# Patient Record
Sex: MALE | Race: WHITE | Employment: OTHER | URBAN - METROPOLITAN AREA
[De-identification: names, ages, dates, MRNs, and addresses within clinical notes are randomized per-mention and may not be internally consistent; named-entity substitution may affect disease eponyms.]

---

## 2019-11-27 ENCOUNTER — HOSPITAL ENCOUNTER (OUTPATIENT)
Dept: ULTRASOUND IMAGING | Age: 54
Discharge: HOME | End: 2019-11-27
Attending: INTERNAL MEDICINE
Payer: COMMERCIAL

## 2019-11-27 DIAGNOSIS — N18.30 CHRONIC KIDNEY DISEASE, STAGE III (MODERATE) (HCC): ICD-10-CM

## 2019-11-27 PROCEDURE — 76770 US EXAM ABDO BACK WALL COMP: CPT

## 2021-01-02 ENCOUNTER — HOSPITAL ENCOUNTER (EMERGENCY)
Age: 56
Discharge: HOME | End: 2021-01-02
Attending: EMERGENCY MEDICINE
Payer: COMMERCIAL

## 2021-01-02 ENCOUNTER — APPOINTMENT (OUTPATIENT)
Dept: ULTRASOUND IMAGING | Age: 56
End: 2021-01-02
Attending: EMERGENCY MEDICINE
Payer: COMMERCIAL

## 2021-01-02 VITALS
RESPIRATION RATE: 10 BRPM | BODY MASS INDEX: 27.64 KG/M2 | OXYGEN SATURATION: 95 % | DIASTOLIC BLOOD PRESSURE: 88 MMHG | SYSTOLIC BLOOD PRESSURE: 129 MMHG | HEART RATE: 90 BPM | WEIGHT: 208.56 LBS | TEMPERATURE: 97.9 F | HEIGHT: 73 IN

## 2021-01-02 DIAGNOSIS — N23 RENAL COLIC: ICD-10-CM

## 2021-01-02 DIAGNOSIS — R73.9 HYPERGLYCEMIA: ICD-10-CM

## 2021-01-02 DIAGNOSIS — N20.0 NEPHROLITHIASIS: Primary | ICD-10-CM

## 2021-01-02 DIAGNOSIS — N18.30 STAGE 3 CHRONIC KIDNEY DISEASE, UNSPECIFIED WHETHER STAGE 3A OR 3B CKD (HCC): ICD-10-CM

## 2021-01-02 LAB
ALBUMIN SERPL-MCNC: 4.3 G/DL (ref 3.5–5)
ALBUMIN/GLOB SERPL: 1 {RATIO} (ref 1.1–2.2)
ALP SERPL-CCNC: 98 U/L (ref 45–117)
ALT SERPL-CCNC: 34 U/L (ref 12–78)
ANION GAP SERPL CALC-SCNC: 10 MMOL/L (ref 5–15)
ANION GAP SERPL CALC-SCNC: 13 MMOL/L (ref 5–15)
APPEARANCE UR: CLEAR
AST SERPL-CCNC: 34 U/L (ref 15–37)
BACTERIA URNS QL MICRO: NEGATIVE /HPF
BASOPHILS # BLD: 0.1 K/UL (ref 0–0.1)
BASOPHILS NFR BLD: 1 % (ref 0–1)
BILIRUB SERPL-MCNC: 0.7 MG/DL (ref 0.2–1)
BILIRUB UR QL: NEGATIVE
BUN SERPL-MCNC: 17 MG/DL (ref 6–20)
BUN SERPL-MCNC: 18 MG/DL (ref 6–20)
BUN/CREAT SERPL: 10 (ref 12–20)
BUN/CREAT SERPL: 9 (ref 12–20)
CALCIUM SERPL-MCNC: 10.1 MG/DL (ref 8.5–10.1)
CALCIUM SERPL-MCNC: 8.5 MG/DL (ref 8.5–10.1)
CHLORIDE SERPL-SCNC: 105 MMOL/L (ref 97–108)
CHLORIDE SERPL-SCNC: 99 MMOL/L (ref 97–108)
CO2 SERPL-SCNC: 25 MMOL/L (ref 21–32)
CO2 SERPL-SCNC: 26 MMOL/L (ref 21–32)
COLOR UR: ABNORMAL
COMMENT, HOLDF: NORMAL
CREAT SERPL-MCNC: 1.76 MG/DL (ref 0.7–1.3)
CREAT SERPL-MCNC: 1.97 MG/DL (ref 0.7–1.3)
DIFFERENTIAL METHOD BLD: ABNORMAL
EOSINOPHIL # BLD: 0.1 K/UL (ref 0–0.4)
EOSINOPHIL NFR BLD: 1 % (ref 0–7)
EPITH CASTS URNS QL MICRO: ABNORMAL /LPF
ERYTHROCYTE [DISTWIDTH] IN BLOOD BY AUTOMATED COUNT: 13.1 % (ref 11.5–14.5)
ERYTHROCYTE [DISTWIDTH] IN BLOOD BY AUTOMATED COUNT: 13.1 % (ref 11.5–14.5)
GLOBULIN SER CALC-MCNC: 4.4 G/DL (ref 2–4)
GLUCOSE BLD STRIP.AUTO-MCNC: 248 MG/DL (ref 65–100)
GLUCOSE SERPL-MCNC: 219 MG/DL (ref 65–100)
GLUCOSE SERPL-MCNC: 300 MG/DL (ref 65–100)
GLUCOSE UR STRIP.AUTO-MCNC: >1000 MG/DL
HCT VFR BLD AUTO: 39.2 % (ref 36.6–50.3)
HCT VFR BLD AUTO: 47 % (ref 36.6–50.3)
HGB BLD-MCNC: 12.5 G/DL (ref 12.1–17)
HGB BLD-MCNC: 14.7 G/DL (ref 12.1–17)
HGB UR QL STRIP: ABNORMAL
HYALINE CASTS URNS QL MICRO: ABNORMAL /LPF (ref 0–5)
IMM GRANULOCYTES # BLD AUTO: 0.1 K/UL (ref 0–0.04)
IMM GRANULOCYTES NFR BLD AUTO: 1 % (ref 0–0.5)
KETONES UR QL STRIP.AUTO: NEGATIVE MG/DL
LEUKOCYTE ESTERASE UR QL STRIP.AUTO: NEGATIVE
LYMPHOCYTES # BLD: 2 K/UL (ref 0.8–3.5)
LYMPHOCYTES NFR BLD: 12 % (ref 12–49)
MCH RBC QN AUTO: 26.6 PG (ref 26–34)
MCH RBC QN AUTO: 26.9 PG (ref 26–34)
MCHC RBC AUTO-ENTMCNC: 31.3 G/DL (ref 30–36.5)
MCHC RBC AUTO-ENTMCNC: 31.9 G/DL (ref 30–36.5)
MCV RBC AUTO: 84.3 FL (ref 80–99)
MCV RBC AUTO: 85 FL (ref 80–99)
MONOCYTES # BLD: 1.9 K/UL (ref 0–1)
MONOCYTES NFR BLD: 11 % (ref 5–13)
NEUTS SEG # BLD: 12.6 K/UL (ref 1.8–8)
NEUTS SEG NFR BLD: 74 % (ref 32–75)
NITRITE UR QL STRIP.AUTO: NEGATIVE
NRBC # BLD: 0 K/UL (ref 0–0.01)
NRBC # BLD: 0 K/UL (ref 0–0.01)
NRBC BLD-RTO: 0 PER 100 WBC
NRBC BLD-RTO: 0 PER 100 WBC
PH UR STRIP: 5 [PH] (ref 5–8)
PLATELET # BLD AUTO: 283 K/UL (ref 150–400)
PLATELET # BLD AUTO: 454 K/UL (ref 150–400)
PMV BLD AUTO: 10.6 FL (ref 8.9–12.9)
PMV BLD AUTO: 10.6 FL (ref 8.9–12.9)
POTASSIUM SERPL-SCNC: 4.3 MMOL/L (ref 3.5–5.1)
POTASSIUM SERPL-SCNC: 4.7 MMOL/L (ref 3.5–5.1)
PROT SERPL-MCNC: 8.7 G/DL (ref 6.4–8.2)
PROT UR STRIP-MCNC: ABNORMAL MG/DL
RBC # BLD AUTO: 4.65 M/UL (ref 4.1–5.7)
RBC # BLD AUTO: 5.53 M/UL (ref 4.1–5.7)
RBC #/AREA URNS HPF: ABNORMAL /HPF (ref 0–5)
SAMPLES BEING HELD,HOLD: NORMAL
SERVICE CMNT-IMP: ABNORMAL
SODIUM SERPL-SCNC: 138 MMOL/L (ref 136–145)
SODIUM SERPL-SCNC: 140 MMOL/L (ref 136–145)
SP GR UR REFRACTOMETRY: >1.03 (ref 1–1.03)
UR CULT HOLD, URHOLD: NORMAL
UROBILINOGEN UR QL STRIP.AUTO: 0.2 EU/DL (ref 0.2–1)
WBC # BLD AUTO: 13.4 K/UL (ref 4.1–11.1)
WBC # BLD AUTO: 16.8 K/UL (ref 4.1–11.1)
WBC URNS QL MICRO: ABNORMAL /HPF (ref 0–4)

## 2021-01-02 PROCEDURE — 76770 US EXAM ABDO BACK WALL COMP: CPT

## 2021-01-02 PROCEDURE — 36415 COLL VENOUS BLD VENIPUNCTURE: CPT

## 2021-01-02 PROCEDURE — 99285 EMERGENCY DEPT VISIT HI MDM: CPT

## 2021-01-02 PROCEDURE — 80053 COMPREHEN METABOLIC PANEL: CPT

## 2021-01-02 PROCEDURE — 85027 COMPLETE CBC AUTOMATED: CPT

## 2021-01-02 PROCEDURE — 74011250637 HC RX REV CODE- 250/637: Performed by: EMERGENCY MEDICINE

## 2021-01-02 PROCEDURE — 96374 THER/PROPH/DIAG INJ IV PUSH: CPT

## 2021-01-02 PROCEDURE — 74011636637 HC RX REV CODE- 636/637: Performed by: EMERGENCY MEDICINE

## 2021-01-02 PROCEDURE — 81001 URINALYSIS AUTO W/SCOPE: CPT

## 2021-01-02 PROCEDURE — 82962 GLUCOSE BLOOD TEST: CPT

## 2021-01-02 PROCEDURE — 85025 COMPLETE CBC W/AUTO DIFF WBC: CPT

## 2021-01-02 PROCEDURE — 96361 HYDRATE IV INFUSION ADD-ON: CPT

## 2021-01-02 PROCEDURE — 96375 TX/PRO/DX INJ NEW DRUG ADDON: CPT

## 2021-01-02 PROCEDURE — 74011250636 HC RX REV CODE- 250/636: Performed by: EMERGENCY MEDICINE

## 2021-01-02 RX ORDER — ONDANSETRON 4 MG/1
4 TABLET, ORALLY DISINTEGRATING ORAL
Status: COMPLETED | OUTPATIENT
Start: 2021-01-02 | End: 2021-01-02

## 2021-01-02 RX ORDER — BUPROPION HYDROCHLORIDE 300 MG/1
300 TABLET ORAL
COMMUNITY
End: 2021-03-09 | Stop reason: SDUPTHER

## 2021-01-02 RX ORDER — OXYCODONE AND ACETAMINOPHEN 5; 325 MG/1; MG/1
2 TABLET ORAL ONCE
Status: COMPLETED | OUTPATIENT
Start: 2021-01-02 | End: 2021-01-02

## 2021-01-02 RX ORDER — SERTRALINE HYDROCHLORIDE 50 MG/1
50 TABLET, FILM COATED ORAL DAILY
COMMUNITY
End: 2021-03-09 | Stop reason: SDUPTHER

## 2021-01-02 RX ORDER — ONDANSETRON 4 MG/1
4 TABLET, ORALLY DISINTEGRATING ORAL
Qty: 10 TAB | Refills: 0 | Status: SHIPPED | OUTPATIENT
Start: 2021-01-02 | End: 2021-02-22

## 2021-01-02 RX ORDER — ONDANSETRON 2 MG/ML
4 INJECTION INTRAMUSCULAR; INTRAVENOUS ONCE
Status: COMPLETED | OUTPATIENT
Start: 2021-01-02 | End: 2021-01-02

## 2021-01-02 RX ORDER — OXYCODONE AND ACETAMINOPHEN 5; 325 MG/1; MG/1
1 TABLET ORAL
Qty: 12 TAB | Refills: 0 | Status: SHIPPED | OUTPATIENT
Start: 2021-01-02 | End: 2021-01-05

## 2021-01-02 RX ORDER — OXYCODONE HYDROCHLORIDE 5 MG/1
5 TABLET ORAL
Status: COMPLETED | OUTPATIENT
Start: 2021-01-02 | End: 2021-01-02

## 2021-01-02 RX ORDER — OXYCODONE AND ACETAMINOPHEN 5; 325 MG/1; MG/1
1 TABLET ORAL ONCE
Status: COMPLETED | OUTPATIENT
Start: 2021-01-02 | End: 2021-01-02

## 2021-01-02 RX ADMIN — OXYCODONE HYDROCHLORIDE 5 MG: 5 TABLET ORAL at 11:16

## 2021-01-02 RX ADMIN — ONDANSETRON 4 MG: 4 TABLET, ORALLY DISINTEGRATING ORAL at 10:13

## 2021-01-02 RX ADMIN — OXYCODONE HYDROCHLORIDE AND ACETAMINOPHEN 1 TABLET: 5; 325 TABLET ORAL at 11:16

## 2021-01-02 RX ADMIN — OXYCODONE HYDROCHLORIDE AND ACETAMINOPHEN 2 TABLET: 5; 325 TABLET ORAL at 10:13

## 2021-01-02 RX ADMIN — HUMAN INSULIN 6 UNITS: 100 INJECTION, SOLUTION SUBCUTANEOUS at 11:04

## 2021-01-02 RX ADMIN — ONDANSETRON 4 MG: 2 INJECTION INTRAMUSCULAR; INTRAVENOUS at 11:16

## 2021-01-02 RX ADMIN — SODIUM CHLORIDE 1000 ML: 9 INJECTION, SOLUTION INTRAVENOUS at 11:05

## 2021-01-02 RX ADMIN — SODIUM CHLORIDE 1000 ML: 900 INJECTION, SOLUTION INTRAVENOUS at 10:13

## 2021-01-02 NOTE — ED TRIAGE NOTES
Pt reports bilateral flank pain and left sided groin pain that started yesterday. Pt reports \"yesterday when he urinated it felt like glass. \" Pt reports \"he has self diagnosed with kidney stones and has been dealing with them for months. \" Pt denies seeing a urologist in the past couple of months.

## 2021-01-02 NOTE — ED PROVIDER NOTES
55-year-old male with a history of diabetes, hypertension, nephrolithiasis, and sarcoidosis is here with kidney stone pain on the left side that is caused him to have trouble sleeping for the past 2 nights.  He says he has seen [facility] in the past and they have had to remove several stones.  This feels entirely similar to multiple prior episodes of kidney stones.  He says the pain radiates around his left flank and down towards his groin.  At times it hurts when he urinates and he feels like he is urinating sand.  Some of the urine is dark, so he presumes there is some blood.  He has had nausea, but no vomiting.  No fever.  He has never had a urinary tract infection from stones.           Past Medical History:   Diagnosis Date   • Cervical stenosis of spinal canal 5/13/2016   • Diabetes (HCC)    • GERD (gastroesophageal reflux disease)    • Hypertension    • Ill-defined condition     sarcoidosis       Past Surgical History:   Procedure Laterality Date   • HX ORTHOPAEDIC      multi lumbar lami/fusions   • HX TONSILLECTOMY     • [place] ABDOMEN SURGERY PROC UNLISTED  2013    liver biopsy         History reviewed. No pertinent family history.    Social History     Socioeconomic History   • Marital status: SINGLE     Spouse name: Not on file   • Number of children: Not on file   • Years of education: Not on file   • Highest education level: Not on file   Occupational History   • Not on file   Social Needs   • Financial resource strain: Not on file   • Food insecurity     Worry: Not on file     Inability: Not on file   • Transportation needs     Medical: Not on file     Non-medical: Not on file   Tobacco Use   • Smoking status: Current Some Day Smoker   • Smokeless tobacco: Never Used   • Tobacco comment: occaisnal cigar   Substance and Sexual Activity   • Alcohol use: Yes     Alcohol/week: 1.0 standard drinks     Types: 1 Glasses of wine per week     Comment: socially   • Drug use: No   • Sexual activity: Not on  file   Lifestyle    Physical activity     Days per week: Not on file     Minutes per session: Not on file    Stress: Not on file   Relationships    Social connections     Talks on phone: Not on file     Gets together: Not on file     Attends [employer] service: Not on file     Active member of club or organization: Not on file     Attends meetings of clubs or organizations: Not on file     Relationship status: Not on file    Intimate partner violence     Fear of current or ex partner: Not on file     Emotionally abused: Not on file     Physically abused: Not on file     Forced sexual activity: Not on file   Other Topics Concern    Not on file   Social History Narrative    Not on file         ALLERGIES: Patient has no known allergies. Review of Systems   Constitutional: Negative for fever. HENT: Negative for trouble swallowing. Eyes: Negative for visual disturbance. Respiratory: Negative for cough. Cardiovascular: Negative for chest pain. Gastrointestinal: Positive for constipation and diarrhea. Negative for abdominal pain. Genitourinary: Positive for dysuria and flank pain. Negative for difficulty urinating. Musculoskeletal: Negative for gait problem. Skin: Negative for rash. Neurological: Negative for headaches. Hematological: Does not bruise/bleed easily. Psychiatric/Behavioral: Negative for sleep disturbance. Vitals:    01/02/21 0939 01/02/21 0942 01/02/21 0945   BP:  (!) 170/105 (!) 144/104   Pulse:  (!) 115 (!) 111   Resp:  20 15   Temp:  97.9 °F (36.6 °C)    SpO2: 99% 98% 95%   Weight:  94.6 kg (208 lb 8.9 oz)    Height:  6' 1\" (1.854 m)             Physical Exam  Constitutional:       Appearance: Normal appearance. HENT:      Head: Normocephalic. Nose: Nose normal.      Mouth/Throat:      Mouth: Mucous membranes are moist.   Eyes:      Extraocular Movements: Extraocular movements intact.       Conjunctiva/sclera: Conjunctivae normal.   Cardiovascular:      Rate and Rhythm: Tachycardia present.   Pulmonary:      Effort: Pulmonary effort is normal. No respiratory distress.   Abdominal:      General: Abdomen is flat.      Palpations: Abdomen is soft.      Tenderness: There is no abdominal tenderness. There is left CVA tenderness. There is no right CVA tenderness.   Musculoskeletal: Normal range of motion.   Skin:     Findings: No rash.   Neurological:      General: No focal deficit present.      Mental Status: He is alert.   Psychiatric:         Behavior: Behavior normal.          MDM  Number of Diagnoses or Management Options  Hyperglycemia  Nephrolithiasis  Renal colic  Stage 3 chronic kidney disease, unspecified whether stage 3a or 3b CKD  Diagnosis management comments: Patient came in fairly well-appearing, but had tachycardia to 100 bpm to 120 bpm.  He also had some leukocytosis, but no fever and he was certainly not septic appearing.  I think both of these are a result of pain, hyperglycemia, and dehydration.  Patient had a normal heart rate at discharge and felt much better.  I see no signs of infection in this patient is not septic.  He is suffering from kidney stones, chronic kidney disease, and high blood sugar.  I gave him some fluids, pain meds, nausea meds, and insulin and his blood sugar is better as are all his symptoms.  He knows to follow-up with the urologist or return to the emergency department should his symptoms worsen.  I prescribed him Percocet and Zofran         Procedures

## 2021-02-22 ENCOUNTER — HOSPITAL ENCOUNTER (OUTPATIENT)
Age: 56
Setting detail: OBSERVATION
Discharge: HOME | End: 2021-02-24
Attending: EMERGENCY MEDICINE | Admitting: HOSPITALIST
Payer: COMMERCIAL

## 2021-02-22 ENCOUNTER — HOSPITAL ENCOUNTER (OUTPATIENT)
Dept: PREADMISSION TESTING | Age: 56
Discharge: HOME | End: 2021-02-22
Payer: COMMERCIAL

## 2021-02-22 ENCOUNTER — APPOINTMENT (OUTPATIENT)
Dept: CT IMAGING | Age: 56
End: 2021-02-22
Attending: STUDENT IN AN ORGANIZED HEALTH CARE EDUCATION/TRAINING PROGRAM
Payer: COMMERCIAL

## 2021-02-22 VITALS
DIASTOLIC BLOOD PRESSURE: 76 MMHG | BODY MASS INDEX: 24.95 KG/M2 | RESPIRATION RATE: 16 BRPM | WEIGHT: 194.45 LBS | HEIGHT: 74 IN | TEMPERATURE: 96 F | SYSTOLIC BLOOD PRESSURE: 105 MMHG | OXYGEN SATURATION: 99 % | HEART RATE: 74 BPM

## 2021-02-22 DIAGNOSIS — R79.89 ELEVATED SERUM CREATININE: ICD-10-CM

## 2021-02-22 DIAGNOSIS — E16.2 HYPOGLYCEMIA: ICD-10-CM

## 2021-02-22 DIAGNOSIS — E87.5 ACUTE HYPERKALEMIA: Primary | ICD-10-CM

## 2021-02-22 LAB
ALBUMIN SERPL-MCNC: 4.2 G/DL (ref 3.5–5)
ALBUMIN/GLOB SERPL: 1 {RATIO} (ref 1.1–2.2)
ALP SERPL-CCNC: 59 U/L (ref 45–117)
ALT SERPL-CCNC: 22 U/L (ref 12–78)
ANION GAP SERPL CALC-SCNC: 5 MMOL/L (ref 5–15)
ANION GAP SERPL CALC-SCNC: 6 MMOL/L (ref 5–15)
APPEARANCE UR: ABNORMAL
AST SERPL-CCNC: 30 U/L (ref 15–37)
ATRIAL RATE: 67 BPM
BACTERIA URNS QL MICRO: NEGATIVE /HPF
BASE DEFICIT BLDV-SCNC: 8.8 MMOL/L
BASOPHILS # BLD: 0.2 K/UL (ref 0–0.1)
BASOPHILS NFR BLD: 1 % (ref 0–1)
BDY SITE: ABNORMAL
BILIRUB SERPL-MCNC: 0.4 MG/DL (ref 0.2–1)
BILIRUB UR QL CFM: POSITIVE
BUN SERPL-MCNC: 43 MG/DL (ref 6–20)
BUN SERPL-MCNC: 44 MG/DL (ref 6–20)
BUN/CREAT SERPL: 16 (ref 12–20)
BUN/CREAT SERPL: 17 (ref 12–20)
CALCIUM SERPL-MCNC: 8.9 MG/DL (ref 8.5–10.1)
CALCIUM SERPL-MCNC: 9.6 MG/DL (ref 8.5–10.1)
CALCULATED P AXIS, ECG09: 8 DEGREES
CALCULATED R AXIS, ECG10: -4 DEGREES
CALCULATED T AXIS, ECG11: 18 DEGREES
CHLORIDE SERPL-SCNC: 106 MMOL/L (ref 97–108)
CHLORIDE SERPL-SCNC: 108 MMOL/L (ref 97–108)
CO2 SERPL-SCNC: 20 MMOL/L (ref 21–32)
CO2 SERPL-SCNC: 24 MMOL/L (ref 21–32)
COLOR UR: ABNORMAL
CREAT SERPL-MCNC: 2.5 MG/DL (ref 0.7–1.3)
CREAT SERPL-MCNC: 2.74 MG/DL (ref 0.7–1.3)
DIAGNOSIS, 93000: NORMAL
DIFFERENTIAL METHOD BLD: ABNORMAL
EOSINOPHIL # BLD: 0.2 K/UL (ref 0–0.4)
EOSINOPHIL NFR BLD: 1 % (ref 0–7)
EPITH CASTS URNS QL MICRO: ABNORMAL /LPF
ERYTHROCYTE [DISTWIDTH] IN BLOOD BY AUTOMATED COUNT: 13.2 % (ref 11.5–14.5)
GLOBULIN SER CALC-MCNC: 4.3 G/DL (ref 2–4)
GLUCOSE BLD STRIP.AUTO-MCNC: 63 MG/DL (ref 65–100)
GLUCOSE SERPL-MCNC: 40 MG/DL (ref 65–100)
GLUCOSE SERPL-MCNC: 98 MG/DL (ref 65–100)
GLUCOSE UR STRIP.AUTO-MCNC: NEGATIVE MG/DL
HCO3 BLDV-SCNC: 19 MMOL/L (ref 23–28)
HCT VFR BLD AUTO: 48.5 % (ref 36.6–50.3)
HGB BLD-MCNC: 14.7 G/DL (ref 12.1–17)
HGB UR QL STRIP: ABNORMAL
HYALINE CASTS URNS QL MICRO: ABNORMAL /LPF (ref 0–5)
IMM GRANULOCYTES # BLD AUTO: 0.1 K/UL (ref 0–0.04)
IMM GRANULOCYTES NFR BLD AUTO: 1 % (ref 0–0.5)
KETONES UR QL STRIP.AUTO: 15 MG/DL
LEUKOCYTE ESTERASE UR QL STRIP.AUTO: ABNORMAL
LYMPHOCYTES # BLD: 1.6 K/UL (ref 0.8–3.5)
LYMPHOCYTES NFR BLD: 10 % (ref 12–49)
MCH RBC QN AUTO: 26.8 PG (ref 26–34)
MCHC RBC AUTO-ENTMCNC: 30.3 G/DL (ref 30–36.5)
MCV RBC AUTO: 88.3 FL (ref 80–99)
MONOCYTES # BLD: 1 K/UL (ref 0–1)
MONOCYTES NFR BLD: 6 % (ref 5–13)
NEUTS SEG # BLD: 12.3 K/UL (ref 1.8–8)
NEUTS SEG NFR BLD: 81 % (ref 32–75)
NITRITE UR QL STRIP.AUTO: POSITIVE
NRBC # BLD: 0 K/UL (ref 0–0.01)
NRBC BLD-RTO: 0 PER 100 WBC
P-R INTERVAL, ECG05: 172 MS
PCO2 BLDV: 45.6 MMHG (ref 41–51)
PH BLDV: 7.23 [PH] (ref 7.32–7.42)
PH UR STRIP: 5.5 [PH] (ref 5–8)
PLATELET # BLD AUTO: 521 K/UL (ref 150–400)
PMV BLD AUTO: 10.2 FL (ref 8.9–12.9)
PO2 BLDV: 45 MMHG (ref 25–40)
POTASSIUM SERPL-SCNC: 6.6 MMOL/L (ref 3.5–5.1)
POTASSIUM SERPL-SCNC: 6.8 MMOL/L (ref 3.5–5.1)
PROT SERPL-MCNC: 8.5 G/DL (ref 6.4–8.2)
PROT UR STRIP-MCNC: 300 MG/DL
Q-T INTERVAL, ECG07: 440 MS
QRS DURATION, ECG06: 136 MS
QTC CALCULATION (BEZET), ECG08: 464 MS
RBC # BLD AUTO: 5.49 M/UL (ref 4.1–5.7)
RBC #/AREA URNS HPF: >100 /HPF (ref 0–5)
SAO2 % BLDV: 73 % (ref 65–88)
SAO2% DEVICE SAO2% SENSOR NAME: ABNORMAL
SERVICE CMNT-IMP: ABNORMAL
SODIUM SERPL-SCNC: 134 MMOL/L (ref 136–145)
SODIUM SERPL-SCNC: 135 MMOL/L (ref 136–145)
SP GR UR REFRACTOMETRY: 1.03 (ref 1–1.03)
SPECIMEN SITE: ABNORMAL
TROPONIN I SERPL-MCNC: <0.05 NG/ML
UA: UC IF INDICATED,UAUC: ABNORMAL
UROBILINOGEN UR QL STRIP.AUTO: 1 EU/DL (ref 0.2–1)
VENTRICULAR RATE, ECG03: 67 BPM
WBC # BLD AUTO: 15.3 K/UL (ref 4.1–11.1)
WBC URNS QL MICRO: >100 /HPF (ref 0–4)

## 2021-02-22 PROCEDURE — 74011636637 HC RX REV CODE- 636/637: Performed by: STUDENT IN AN ORGANIZED HEALTH CARE EDUCATION/TRAINING PROGRAM

## 2021-02-22 PROCEDURE — 99285 EMERGENCY DEPT VISIT HI MDM: CPT

## 2021-02-22 PROCEDURE — 93005 ELECTROCARDIOGRAM TRACING: CPT

## 2021-02-22 PROCEDURE — 74011000250 HC RX REV CODE- 250: Performed by: STUDENT IN AN ORGANIZED HEALTH CARE EDUCATION/TRAINING PROGRAM

## 2021-02-22 PROCEDURE — 80053 COMPREHEN METABOLIC PANEL: CPT

## 2021-02-22 PROCEDURE — 74176 CT ABD & PELVIS W/O CONTRAST: CPT

## 2021-02-22 PROCEDURE — 81001 URINALYSIS AUTO W/SCOPE: CPT

## 2021-02-22 PROCEDURE — 36415 COLL VENOUS BLD VENIPUNCTURE: CPT

## 2021-02-22 PROCEDURE — 87086 URINE CULTURE/COLONY COUNT: CPT

## 2021-02-22 PROCEDURE — 74011250636 HC RX REV CODE- 250/636: Performed by: STUDENT IN AN ORGANIZED HEALTH CARE EDUCATION/TRAINING PROGRAM

## 2021-02-22 PROCEDURE — 84484 ASSAY OF TROPONIN QUANT: CPT

## 2021-02-22 PROCEDURE — 82803 BLOOD GASES ANY COMBINATION: CPT

## 2021-02-22 PROCEDURE — 82962 GLUCOSE BLOOD TEST: CPT

## 2021-02-22 PROCEDURE — 74011000258 HC RX REV CODE- 258: Performed by: STUDENT IN AN ORGANIZED HEALTH CARE EDUCATION/TRAINING PROGRAM

## 2021-02-22 PROCEDURE — 85025 COMPLETE CBC W/AUTO DIFF WBC: CPT

## 2021-02-22 PROCEDURE — 96361 HYDRATE IV INFUSION ADD-ON: CPT

## 2021-02-22 PROCEDURE — 96375 TX/PRO/DX INJ NEW DRUG ADDON: CPT

## 2021-02-22 PROCEDURE — 96374 THER/PROPH/DIAG INJ IV PUSH: CPT

## 2021-02-22 RX ORDER — DEXTROSE MONOHYDRATE AND SODIUM CHLORIDE 5; .45 G/100ML; G/100ML
150 INJECTION, SOLUTION INTRAVENOUS CONTINUOUS
Status: DISCONTINUED | OUTPATIENT
Start: 2021-02-22 | End: 2021-02-23

## 2021-02-22 RX ORDER — SODIUM CHLORIDE 9 MG/ML
125 INJECTION, SOLUTION INTRAVENOUS CONTINUOUS
Status: CANCELLED | OUTPATIENT
Start: 2021-02-22

## 2021-02-22 RX ORDER — SODIUM POLYSTYRENE SULFONATE 15 G/60ML
30 SUSPENSION ORAL; RECTAL
Status: ACTIVE | OUTPATIENT
Start: 2021-02-22 | End: 2021-02-23

## 2021-02-22 RX ORDER — PROPRANOLOL HYDROCHLORIDE 60 MG/1
60 CAPSULE, EXTENDED RELEASE ORAL DAILY
COMMUNITY
End: 2021-09-03

## 2021-02-22 RX ORDER — INSULIN GLARGINE 100 [IU]/ML
20 INJECTION, SOLUTION SUBCUTANEOUS EVERY EVENING
Status: ON HOLD | COMMUNITY
End: 2021-02-24 | Stop reason: SDUPTHER

## 2021-02-22 RX ORDER — OXYCODONE AND ACETAMINOPHEN 5; 325 MG/1; MG/1
1 TABLET ORAL
Status: ON HOLD | COMMUNITY
End: 2021-03-01 | Stop reason: SDUPTHER

## 2021-02-22 RX ORDER — OMEPRAZOLE 20 MG/1
20 CAPSULE, DELAYED RELEASE ORAL DAILY
COMMUNITY
End: 2021-03-09 | Stop reason: SDUPTHER

## 2021-02-22 RX ORDER — SULFAMETHOXAZOLE AND TRIMETHOPRIM 800; 160 MG/1; MG/1
1 TABLET ORAL 2 TIMES DAILY
COMMUNITY
Start: 2021-02-08 | End: 2021-02-24

## 2021-02-22 RX ORDER — ATORVASTATIN CALCIUM 20 MG/1
20 TABLET, FILM COATED ORAL DAILY
COMMUNITY
End: 2021-02-22

## 2021-02-22 RX ORDER — DEXTROAMPHETAMINE SACCHARATE, AMPHETAMINE ASPARTATE, DEXTROAMPHETAMINE SULFATE AND AMPHETAMINE SULFATE 5; 5; 5; 5 MG/1; MG/1; MG/1; MG/1
20 TABLET ORAL
COMMUNITY
End: 2021-03-09 | Stop reason: SDUPTHER

## 2021-02-22 RX ORDER — CALCIUM GLUCONATE 94 MG/ML
1 INJECTION, SOLUTION INTRAVENOUS
Status: COMPLETED | OUTPATIENT
Start: 2021-02-22 | End: 2021-02-22

## 2021-02-22 RX ORDER — DEXTROSE 50 % IN WATER (D50W) INTRAVENOUS SYRINGE
25
Status: COMPLETED | OUTPATIENT
Start: 2021-02-22 | End: 2021-02-22

## 2021-02-22 RX ORDER — KETOROLAC TROMETHAMINE 10 MG/1
10 TABLET, FILM COATED ORAL
COMMUNITY
End: 2021-02-24

## 2021-02-22 RX ORDER — SITAGLIPTIN AND METFORMIN HYDROCHLORIDE 50; 1000 MG/1; MG/1
1 TABLET, FILM COATED, EXTENDED RELEASE ORAL 2 TIMES DAILY WITH MEALS
COMMUNITY
End: 2021-02-24

## 2021-02-22 RX ADMIN — DEXTROSE MONOHYDRATE 25 G: 25 INJECTION, SOLUTION INTRAVENOUS at 22:28

## 2021-02-22 RX ADMIN — CALCIUM GLUCONATE 1 G: 98 INJECTION, SOLUTION INTRAVENOUS at 22:29

## 2021-02-22 RX ADMIN — DEXTROSE AND SODIUM CHLORIDE 150 ML/HR: 5; 450 INJECTION, SOLUTION INTRAVENOUS at 22:20

## 2021-02-22 RX ADMIN — INSULIN HUMAN 10 UNITS: 100 INJECTION, SOLUTION PARENTERAL at 22:28

## 2021-02-22 NOTE — PERIOP NOTES
It is now mandated that all surgical patients be tested for COVID-19 prior to surgery. Testing has to be exactly 4 days prior to surgery. Your COVID test date is Thursday, Feb 25th   between 8:00 am and 11:00 am.       COVID testing will be performed curbside at the [facility] [place] [place]. There will be signs leading you to the testing site. You will need to bring a photo ID with you to be swabbed. Patients are advised to self-quarantine at home after testing and prior to your surgery date. You will be notified if your results are positive. What to watch for:   Coronavirus (COVID-19) affects different people in different ways   It also appears with a wide range of symptoms from mild to severe   Signs usually appear 2-14 days after exposure     If you develop any of the following, notify your doctor immediately:  o Fever  o Chills, with or without a shiver  o Muscle pain  o Headache  o Sore throat  o Dry cough  o New loss of taste or smell  o Tiredness      If you develop any of the following, call 911:  o Shortness of breath  o Difficulty breathing  o Chest pain  o New confusion  Blueness of fingers and/or lips  [address] [facility]                                  [phone]   MAIN PRE OP                          [phone]                                                                                AMBULATORY PRE OP          [phone]  PRE-ADMISSION TESTING    [phone]   Surgery Date:  Monday, March 1st*       Is surgery arrival time given by surgeon? YES  NO  If NO, [facility] staff will call you between 3 and 7pm the day before your surgery with your arrival time. (If your surgery is on a Monday, we will call you the Friday before.)    Call [phone] after 7pm Monday-Friday if you did not receive this call.     INSTRUCTIONS BEFORE YOUR SURGERY   When You  Arrive Arrive at the [address] [facility] on the day of your surgery  Have your insurance card, photo ID, and any copayment (if needed)   Food   and   Drink NO food or drink after midnight the night before surgery    This means NO water, gum, mints, coffee, juice, etc.  No alcohol (beer, wine, liquor) 24 hours before and after surgery   Medications to   TAKE   Morning of Surgery MEDICATIONS TO TAKE THE MORNING OF SURGERY WITH A SIP OF WATER:   Xanax if needed  Bupropion  Sertraline  Omeprazole  Propranolol     Medications  To  STOP      7 days before surgery Non-Steroidal anti-inflammatory Drugs (NSAID's): for example, Ibuprofen (Advil, Motrin), Naproxen (Aleve)  Aspirin, if taking for pain   Herbal supplements, vitamins, and fish oil  Other:  (Pain medications not listed above, including Tylenol may be taken)       Bathing Clothing  Jewelry  Valuables     If you shower the morning of surgery, please do not apply anything to your skin (lotions, powders, deodorant, or makeup, especially mascara)  Follow Chlorhexidine Care Fusion body wash instructions provided to you during PAT appointment. Begin 3 days prior to surgery. Do not shave or trim anywhere 24 hours before surgery  Wear your hair loose or down; no pony-tails, buns, or metal hair clips  Wear loose, comfortable, clean clothes  Wear glasses instead of contacts  Leave money, valuables, and jewelry, including body piercings, at home   Going Home - SAME-DAY SURGERY: You must have a responsible adult drive you home and stay with you 24 hours after surgery     Special Instructions Call Dr. Maribel Krishnamurthy for instructions on how/if to take your Diabetic medicines for the evening BEFORE Surgery  Eat a high protein snack the evening before surgery  If you blood sugar is low in the morning, use Glucose tabs as needed  DO NOT TAKE: Lisinopril or Diabetic Medicines on the Day of Surgery     Follow all instructions so your surgery wont be cancelled. Please, be on time. If a situation occurs and you are delayed the day of surgery, call [phone] or [phone]. If your physical condition changes (like a fever, cold, flu, etc.) call your surgeon. Home medication(s) reviewed and verified via     LIST   VERBAL   during PAT appointment. The patient was contacted by     IN-PERSON  The patient verbalizes understanding of all instructions and     DOES NOT   need reinforcement.   o

## 2021-02-22 NOTE — ED TRIAGE NOTES
Patient arrives to ED as a referral from [facility] for potassium of 6.8 with preop labs this morning     Patient scheduled to have surgery for kidney stones on 3/1

## 2021-02-22 NOTE — PERIOP NOTES
Critical Potassium faxed to Dr. Henrik Arndt office via EMR. Called office to report lab result to nurse. Waiting for return call from nurse. Nurse from Dr. Tay Ken returned call and I reported K= of 6.8 with readback of critical lab.

## 2021-02-23 PROBLEM — D72.829 LEUKOCYTOSIS: Status: ACTIVE | Noted: 2021-02-23

## 2021-02-23 PROBLEM — D75.838 REACTIVE THROMBOCYTOSIS: Status: ACTIVE | Noted: 2021-02-23

## 2021-02-23 PROBLEM — E11.10 DM (DIABETES MELLITUS) TYPE 2, UNCONTROLLED, WITH KETOACIDOSIS (HCC): Status: ACTIVE | Noted: 2021-02-23

## 2021-02-23 PROBLEM — E11.9 DM (DIABETES MELLITUS), TYPE 2 (HCC): Status: ACTIVE | Noted: 2021-02-23

## 2021-02-23 PROBLEM — D86.9 SARCOIDOSIS: Status: ACTIVE | Noted: 2021-02-23

## 2021-02-23 PROBLEM — I10 HTN (HYPERTENSION): Status: ACTIVE | Noted: 2021-02-23

## 2021-02-23 PROBLEM — N17.9 AKI (ACUTE KIDNEY INJURY) (HCC): Status: ACTIVE | Noted: 2021-02-23

## 2021-02-23 PROBLEM — N39.0 UTI (URINARY TRACT INFECTION): Status: ACTIVE | Noted: 2021-02-23

## 2021-02-23 PROBLEM — E16.2 HYPOGLYCEMIA: Status: ACTIVE | Noted: 2021-02-23

## 2021-02-23 PROBLEM — E87.5 ACUTE HYPERKALEMIA: Status: ACTIVE | Noted: 2021-02-23

## 2021-02-23 PROBLEM — N20.0 RECURRENT NEPHROLITHIASIS: Status: ACTIVE | Noted: 2021-02-23

## 2021-02-23 LAB
25(OH)D3 SERPL-MCNC: 15.6 NG/ML (ref 30–100)
ALBUMIN SERPL-MCNC: 3.4 G/DL (ref 3.5–5)
ALBUMIN SERPL-MCNC: 3.6 G/DL (ref 3.5–5)
ALBUMIN/GLOB SERPL: 0.9 {RATIO} (ref 1.1–2.2)
ALBUMIN/GLOB SERPL: 0.9 {RATIO} (ref 1.1–2.2)
ALP SERPL-CCNC: 46 U/L (ref 45–117)
ALP SERPL-CCNC: 53 U/L (ref 45–117)
ALT SERPL-CCNC: 16 U/L (ref 12–78)
ALT SERPL-CCNC: 18 U/L (ref 12–78)
ANION GAP SERPL CALC-SCNC: 6 MMOL/L (ref 5–15)
ANION GAP SERPL CALC-SCNC: 8 MMOL/L (ref 5–15)
ANION GAP SERPL CALC-SCNC: 9 MMOL/L (ref 5–15)
APPEARANCE UR: ABNORMAL
AST SERPL-CCNC: 17 U/L (ref 15–37)
AST SERPL-CCNC: 21 U/L (ref 15–37)
ATRIAL RATE: 70 BPM
BACTERIA SPEC CULT: NORMAL
BACTERIA URNS QL MICRO: ABNORMAL /HPF
BASOPHILS # BLD: 0.1 K/UL (ref 0–0.1)
BASOPHILS # BLD: 0.1 K/UL (ref 0–0.1)
BASOPHILS NFR BLD: 2 % (ref 0–1)
BASOPHILS NFR BLD: 2 % (ref 0–1)
BILIRUB SERPL-MCNC: 0.2 MG/DL (ref 0.2–1)
BILIRUB SERPL-MCNC: 0.5 MG/DL (ref 0.2–1)
BILIRUB UR QL CFM: NEGATIVE
BUN SERPL-MCNC: 34 MG/DL (ref 6–20)
BUN SERPL-MCNC: 41 MG/DL (ref 6–20)
BUN SERPL-MCNC: 42 MG/DL (ref 6–20)
BUN/CREAT SERPL: 15 (ref 12–20)
BUN/CREAT SERPL: 16 (ref 12–20)
BUN/CREAT SERPL: 18 (ref 12–20)
CALCIUM SERPL-MCNC: 11.3 MG/DL (ref 8.5–10.1)
CALCIUM SERPL-MCNC: 11.6 MG/DL (ref 8.5–10.1)
CALCIUM SERPL-MCNC: 8.5 MG/DL (ref 8.5–10.1)
CALCIUM SERPL-MCNC: 8.8 MG/DL (ref 8.5–10.1)
CALCULATED P AXIS, ECG09: -2 DEGREES
CALCULATED R AXIS, ECG10: -11 DEGREES
CHLORIDE SERPL-SCNC: 107 MMOL/L (ref 97–108)
CHLORIDE SERPL-SCNC: 108 MMOL/L (ref 97–108)
CHLORIDE SERPL-SCNC: 109 MMOL/L (ref 97–108)
CO2 SERPL-SCNC: 18 MMOL/L (ref 21–32)
CO2 SERPL-SCNC: 20 MMOL/L (ref 21–32)
CO2 SERPL-SCNC: 21 MMOL/L (ref 21–32)
COLOR UR: ABNORMAL
COMMENT, HOLDF: NORMAL
CREAT SERPL-MCNC: 2.3 MG/DL (ref 0.7–1.3)
CREAT SERPL-MCNC: 2.32 MG/DL (ref 0.7–1.3)
CREAT SERPL-MCNC: 2.63 MG/DL (ref 0.7–1.3)
DIAGNOSIS, 93000: NORMAL
DIFFERENTIAL METHOD BLD: ABNORMAL
DIFFERENTIAL METHOD BLD: ABNORMAL
EOSINOPHIL # BLD: 0.2 K/UL (ref 0–0.4)
EOSINOPHIL # BLD: 0.2 K/UL (ref 0–0.4)
EOSINOPHIL NFR BLD: 2 % (ref 0–7)
EOSINOPHIL NFR BLD: 3 % (ref 0–7)
EPITH CASTS URNS QL MICRO: ABNORMAL /LPF
ERYTHROCYTE [DISTWIDTH] IN BLOOD BY AUTOMATED COUNT: 13.5 % (ref 11.5–14.5)
ERYTHROCYTE [DISTWIDTH] IN BLOOD BY AUTOMATED COUNT: 13.9 % (ref 11.5–14.5)
EST. AVERAGE GLUCOSE BLD GHB EST-MCNC: 148 MG/DL
GLOBULIN SER CALC-MCNC: 3.6 G/DL (ref 2–4)
GLOBULIN SER CALC-MCNC: 3.8 G/DL (ref 2–4)
GLUCOSE BLD STRIP.AUTO-MCNC: 127 MG/DL (ref 65–100)
GLUCOSE BLD STRIP.AUTO-MCNC: 137 MG/DL (ref 65–100)
GLUCOSE BLD STRIP.AUTO-MCNC: 38 MG/DL (ref 65–100)
GLUCOSE BLD STRIP.AUTO-MCNC: 40 MG/DL (ref 65–100)
GLUCOSE BLD STRIP.AUTO-MCNC: 41 MG/DL (ref 65–100)
GLUCOSE BLD STRIP.AUTO-MCNC: 41 MG/DL (ref 65–100)
GLUCOSE BLD STRIP.AUTO-MCNC: 99 MG/DL (ref 65–100)
GLUCOSE SERPL-MCNC: 103 MG/DL (ref 65–100)
GLUCOSE SERPL-MCNC: 160 MG/DL (ref 65–100)
GLUCOSE SERPL-MCNC: 181 MG/DL (ref 65–100)
GLUCOSE UR STRIP.AUTO-MCNC: NEGATIVE MG/DL
HBA1C MFR BLD: 6.8 % (ref 4–5.6)
HCT VFR BLD AUTO: 33.5 % (ref 36.6–50.3)
HCT VFR BLD AUTO: 39.6 % (ref 36.6–50.3)
HGB BLD-MCNC: 12.3 G/DL (ref 12.1–17)
HGB BLD-MCNC: 9.8 G/DL (ref 12.1–17)
HGB UR QL STRIP: ABNORMAL
IMM GRANULOCYTES # BLD AUTO: 0 K/UL (ref 0–0.04)
IMM GRANULOCYTES # BLD AUTO: 0 K/UL (ref 0–0.04)
IMM GRANULOCYTES NFR BLD AUTO: 0 % (ref 0–0.5)
IMM GRANULOCYTES NFR BLD AUTO: 1 % (ref 0–0.5)
KETONES UR QL STRIP.AUTO: ABNORMAL MG/DL
LEUKOCYTE ESTERASE UR QL STRIP.AUTO: ABNORMAL
LYMPHOCYTES # BLD: 1.6 K/UL (ref 0.8–3.5)
LYMPHOCYTES # BLD: 1.7 K/UL (ref 0.8–3.5)
LYMPHOCYTES NFR BLD: 22 % (ref 12–49)
LYMPHOCYTES NFR BLD: 24 % (ref 12–49)
MAGNESIUM SERPL-MCNC: 1.6 MG/DL (ref 1.6–2.4)
MAGNESIUM SERPL-MCNC: 1.9 MG/DL (ref 1.6–2.4)
MCH RBC QN AUTO: 26.6 PG (ref 26–34)
MCH RBC QN AUTO: 26.8 PG (ref 26–34)
MCHC RBC AUTO-ENTMCNC: 29.3 G/DL (ref 30–36.5)
MCHC RBC AUTO-ENTMCNC: 31.1 G/DL (ref 30–36.5)
MCV RBC AUTO: 86.3 FL (ref 80–99)
MCV RBC AUTO: 90.8 FL (ref 80–99)
MONOCYTES # BLD: 0.8 K/UL (ref 0–1)
MONOCYTES # BLD: 0.8 K/UL (ref 0–1)
MONOCYTES NFR BLD: 10 % (ref 5–13)
MONOCYTES NFR BLD: 12 % (ref 5–13)
NEUTS SEG # BLD: 4.1 K/UL (ref 1.8–8)
NEUTS SEG # BLD: 5.1 K/UL (ref 1.8–8)
NEUTS SEG NFR BLD: 59 % (ref 32–75)
NEUTS SEG NFR BLD: 64 % (ref 32–75)
NITRITE UR QL STRIP.AUTO: NEGATIVE
NRBC # BLD: 0 K/UL (ref 0–0.01)
NRBC # BLD: 0 K/UL (ref 0–0.01)
NRBC BLD-RTO: 0 PER 100 WBC
NRBC BLD-RTO: 0 PER 100 WBC
P-R INTERVAL, ECG05: 190 MS
PH UR STRIP: 5.5 [PH] (ref 5–8)
PHOSPHATE SERPL-MCNC: 3.6 MG/DL (ref 2.6–4.7)
PHOSPHATE SERPL-MCNC: 3.6 MG/DL (ref 2.6–4.7)
PLATELET # BLD AUTO: 283 K/UL (ref 150–400)
PLATELET # BLD AUTO: 341 K/UL (ref 150–400)
PMV BLD AUTO: 10.3 FL (ref 8.9–12.9)
PMV BLD AUTO: 10.4 FL (ref 8.9–12.9)
POTASSIUM SERPL-SCNC: 4.8 MMOL/L (ref 3.5–5.1)
POTASSIUM SERPL-SCNC: 5.6 MMOL/L (ref 3.5–5.1)
POTASSIUM SERPL-SCNC: 6 MMOL/L (ref 3.5–5.1)
PROT SERPL-MCNC: 7 G/DL (ref 6.4–8.2)
PROT SERPL-MCNC: 7.4 G/DL (ref 6.4–8.2)
PROT UR STRIP-MCNC: 100 MG/DL
PTH-INTACT SERPL-MCNC: 48.3 PG/ML (ref 18.4–88)
Q-T INTERVAL, ECG07: 454 MS
QRS DURATION, ECG06: 148 MS
QTC CALCULATION (BEZET), ECG08: 490 MS
RBC # BLD AUTO: 3.69 M/UL (ref 4.1–5.7)
RBC # BLD AUTO: 4.59 M/UL (ref 4.1–5.7)
RBC #/AREA URNS HPF: >100 /HPF (ref 0–5)
RBC MORPH BLD: ABNORMAL
SAMPLES BEING HELD,HOLD: NORMAL
SERVICE CMNT-IMP: ABNORMAL
SERVICE CMNT-IMP: NORMAL
SERVICE CMNT-IMP: NORMAL
SODIUM SERPL-SCNC: 135 MMOL/L (ref 136–145)
SODIUM SERPL-SCNC: 135 MMOL/L (ref 136–145)
SODIUM SERPL-SCNC: 136 MMOL/L (ref 136–145)
SP GR UR REFRACTOMETRY: 1.02 (ref 1–1.03)
UROBILINOGEN UR QL STRIP.AUTO: 1 EU/DL (ref 0.2–1)
VENTRICULAR RATE, ECG03: 70 BPM
WBC # BLD AUTO: 6.8 K/UL (ref 4.1–11.1)
WBC # BLD AUTO: 8 K/UL (ref 4.1–11.1)
WBC URNS QL MICRO: ABNORMAL /HPF (ref 0–4)

## 2021-02-23 PROCEDURE — 85025 COMPLETE CBC W/AUTO DIFF WBC: CPT

## 2021-02-23 PROCEDURE — 74011250636 HC RX REV CODE- 250/636: Performed by: HOSPITALIST

## 2021-02-23 PROCEDURE — 74011250637 HC RX REV CODE- 250/637: Performed by: INTERNAL MEDICINE

## 2021-02-23 PROCEDURE — 74011000250 HC RX REV CODE- 250: Performed by: INTERNAL MEDICINE

## 2021-02-23 PROCEDURE — 36415 COLL VENOUS BLD VENIPUNCTURE: CPT

## 2021-02-23 PROCEDURE — 83735 ASSAY OF MAGNESIUM: CPT

## 2021-02-23 PROCEDURE — 74011250636 HC RX REV CODE- 250/636: Performed by: INTERNAL MEDICINE

## 2021-02-23 PROCEDURE — 80053 COMPREHEN METABOLIC PANEL: CPT

## 2021-02-23 PROCEDURE — 87086 URINE CULTURE/COLONY COUNT: CPT

## 2021-02-23 PROCEDURE — 83036 HEMOGLOBIN GLYCOSYLATED A1C: CPT

## 2021-02-23 PROCEDURE — 82962 GLUCOSE BLOOD TEST: CPT

## 2021-02-23 PROCEDURE — 74011000258 HC RX REV CODE- 258: Performed by: HOSPITALIST

## 2021-02-23 PROCEDURE — 96361 HYDRATE IV INFUSION ADD-ON: CPT

## 2021-02-23 PROCEDURE — G0378 HOSPITAL OBSERVATION PER HR: HCPCS

## 2021-02-23 PROCEDURE — 82668 ASSAY OF ERYTHROPOIETIN: CPT

## 2021-02-23 PROCEDURE — 84100 ASSAY OF PHOSPHORUS: CPT

## 2021-02-23 PROCEDURE — 99218 HC RM OBSERVATION: CPT

## 2021-02-23 PROCEDURE — 83970 ASSAY OF PARATHORMONE: CPT

## 2021-02-23 PROCEDURE — 65660000000 HC RM CCU STEPDOWN

## 2021-02-23 PROCEDURE — 74011000258 HC RX REV CODE- 258: Performed by: INTERNAL MEDICINE

## 2021-02-23 PROCEDURE — 96375 TX/PRO/DX INJ NEW DRUG ADDON: CPT

## 2021-02-23 PROCEDURE — 96376 TX/PRO/DX INJ SAME DRUG ADON: CPT

## 2021-02-23 PROCEDURE — 2709999900 HC NON-CHARGEABLE SUPPLY

## 2021-02-23 PROCEDURE — 82306 VITAMIN D 25 HYDROXY: CPT

## 2021-02-23 PROCEDURE — 96372 THER/PROPH/DIAG INJ SC/IM: CPT

## 2021-02-23 PROCEDURE — 74011250637 HC RX REV CODE- 250/637: Performed by: HOSPITALIST

## 2021-02-23 RX ORDER — ALBUTEROL SULFATE 0.83 MG/ML
2.5 SOLUTION RESPIRATORY (INHALATION)
Status: DISCONTINUED | OUTPATIENT
Start: 2021-02-23 | End: 2021-02-24 | Stop reason: HOSPADM

## 2021-02-23 RX ORDER — SODIUM CHLORIDE 0.9 % (FLUSH) 0.9 %
5-40 SYRINGE (ML) INJECTION AS NEEDED
Status: DISCONTINUED | OUTPATIENT
Start: 2021-02-23 | End: 2021-02-24 | Stop reason: HOSPADM

## 2021-02-23 RX ORDER — DIPHENHYDRAMINE HCL 25 MG
25 CAPSULE ORAL
Status: DISCONTINUED | OUTPATIENT
Start: 2021-02-23 | End: 2021-02-24 | Stop reason: HOSPADM

## 2021-02-23 RX ORDER — CALCIUM CARB/MAGNESIUM CARB 311-232MG
5 TABLET ORAL
Status: DISCONTINUED | OUTPATIENT
Start: 2021-02-23 | End: 2021-02-24 | Stop reason: HOSPADM

## 2021-02-23 RX ORDER — INSULIN LISPRO 100 [IU]/ML
INJECTION, SOLUTION INTRAVENOUS; SUBCUTANEOUS
Status: DISCONTINUED | OUTPATIENT
Start: 2021-02-23 | End: 2021-02-24 | Stop reason: HOSPADM

## 2021-02-23 RX ORDER — SODIUM CHLORIDE 0.9 % (FLUSH) 0.9 %
5-40 SYRINGE (ML) INJECTION EVERY 8 HOURS
Status: DISCONTINUED | OUTPATIENT
Start: 2021-02-23 | End: 2021-02-24 | Stop reason: HOSPADM

## 2021-02-23 RX ORDER — SODIUM POLYSTYRENE SULFONATE 15 G/60ML
45 SUSPENSION ORAL; RECTAL
Status: COMPLETED | OUTPATIENT
Start: 2021-02-23 | End: 2021-02-23

## 2021-02-23 RX ORDER — HYDRALAZINE HYDROCHLORIDE 20 MG/ML
20 INJECTION INTRAMUSCULAR; INTRAVENOUS
Status: DISCONTINUED | OUTPATIENT
Start: 2021-02-23 | End: 2021-02-24 | Stop reason: HOSPADM

## 2021-02-23 RX ORDER — HEPARIN SODIUM 5000 [USP'U]/ML
5000 INJECTION, SOLUTION INTRAVENOUS; SUBCUTANEOUS EVERY 8 HOURS
Status: DISCONTINUED | OUTPATIENT
Start: 2021-02-23 | End: 2021-02-24 | Stop reason: HOSPADM

## 2021-02-23 RX ORDER — SIMETHICONE 80 MG
80 TABLET,CHEWABLE ORAL
Status: DISCONTINUED | OUTPATIENT
Start: 2021-02-23 | End: 2021-02-24 | Stop reason: HOSPADM

## 2021-02-23 RX ORDER — LORAZEPAM 2 MG/ML
0.5 INJECTION INTRAMUSCULAR
Status: DISCONTINUED | OUTPATIENT
Start: 2021-02-23 | End: 2021-02-24 | Stop reason: HOSPADM

## 2021-02-23 RX ORDER — POLYETHYLENE GLYCOL 3350 17 G/17G
17 POWDER, FOR SOLUTION ORAL DAILY PRN
Status: DISCONTINUED | OUTPATIENT
Start: 2021-02-23 | End: 2021-02-24 | Stop reason: HOSPADM

## 2021-02-23 RX ORDER — DIPHENHYDRAMINE HYDROCHLORIDE 50 MG/ML
25 INJECTION, SOLUTION INTRAMUSCULAR; INTRAVENOUS
Status: DISCONTINUED | OUTPATIENT
Start: 2021-02-23 | End: 2021-02-24 | Stop reason: HOSPADM

## 2021-02-23 RX ORDER — PANTOPRAZOLE SODIUM 40 MG/1
40 TABLET, DELAYED RELEASE ORAL
Status: DISCONTINUED | OUTPATIENT
Start: 2021-02-23 | End: 2021-02-24 | Stop reason: HOSPADM

## 2021-02-23 RX ORDER — ACETAMINOPHEN 650 MG/1
650 SUPPOSITORY RECTAL
Status: DISCONTINUED | OUTPATIENT
Start: 2021-02-23 | End: 2021-02-24 | Stop reason: HOSPADM

## 2021-02-23 RX ORDER — FACIAL-BODY WIPES
10 EACH TOPICAL DAILY PRN
Status: DISCONTINUED | OUTPATIENT
Start: 2021-02-23 | End: 2021-02-24 | Stop reason: HOSPADM

## 2021-02-23 RX ORDER — DEXTROSE MONOHYDRATE AND SODIUM CHLORIDE 5; .9 G/100ML; G/100ML
150 INJECTION, SOLUTION INTRAVENOUS CONTINUOUS
Status: DISCONTINUED | OUTPATIENT
Start: 2021-02-23 | End: 2021-02-23

## 2021-02-23 RX ORDER — OXYCODONE HYDROCHLORIDE 5 MG/1
5 TABLET ORAL
Status: DISCONTINUED | OUTPATIENT
Start: 2021-02-23 | End: 2021-02-24 | Stop reason: HOSPADM

## 2021-02-23 RX ORDER — OXYCODONE HYDROCHLORIDE 5 MG/1
10 TABLET ORAL
Status: DISCONTINUED | OUTPATIENT
Start: 2021-02-23 | End: 2021-02-24 | Stop reason: HOSPADM

## 2021-02-23 RX ORDER — FAMOTIDINE 20 MG/1
20 TABLET, FILM COATED ORAL DAILY
Status: DISCONTINUED | OUTPATIENT
Start: 2021-02-23 | End: 2021-02-23

## 2021-02-23 RX ORDER — LABETALOL HCL 20 MG/4 ML
20 SYRINGE (ML) INTRAVENOUS
Status: DISCONTINUED | OUTPATIENT
Start: 2021-02-23 | End: 2021-02-24 | Stop reason: HOSPADM

## 2021-02-23 RX ORDER — ONDANSETRON 2 MG/ML
4 INJECTION INTRAMUSCULAR; INTRAVENOUS
Status: DISCONTINUED | OUTPATIENT
Start: 2021-02-23 | End: 2021-02-24 | Stop reason: HOSPADM

## 2021-02-23 RX ORDER — MAG HYDROX/ALUMINUM HYD/SIMETH 200-200-20
30 SUSPENSION, ORAL (FINAL DOSE FORM) ORAL
Status: DISCONTINUED | OUTPATIENT
Start: 2021-02-23 | End: 2021-02-24 | Stop reason: HOSPADM

## 2021-02-23 RX ORDER — ACETAMINOPHEN 325 MG/1
650 TABLET ORAL
Status: DISCONTINUED | OUTPATIENT
Start: 2021-02-23 | End: 2021-02-24 | Stop reason: HOSPADM

## 2021-02-23 RX ORDER — MAGNESIUM SULFATE 100 %
4 CRYSTALS MISCELLANEOUS AS NEEDED
Status: DISCONTINUED | OUTPATIENT
Start: 2021-02-23 | End: 2021-02-24 | Stop reason: HOSPADM

## 2021-02-23 RX ORDER — DEXTROSE MONOHYDRATE AND SODIUM CHLORIDE 5; .45 G/100ML; G/100ML
75 INJECTION, SOLUTION INTRAVENOUS CONTINUOUS
Status: DISCONTINUED | OUTPATIENT
Start: 2021-02-23 | End: 2021-02-23

## 2021-02-23 RX ORDER — MORPHINE SULFATE 2 MG/ML
2 INJECTION, SOLUTION INTRAMUSCULAR; INTRAVENOUS
Status: DISCONTINUED | OUTPATIENT
Start: 2021-02-23 | End: 2021-02-24 | Stop reason: HOSPADM

## 2021-02-23 RX ORDER — SODIUM CHLORIDE 9 MG/ML
100 INJECTION, SOLUTION INTRAVENOUS CONTINUOUS
Status: DISCONTINUED | OUTPATIENT
Start: 2021-02-23 | End: 2021-02-23

## 2021-02-23 RX ORDER — DEXTROSE 50 % IN WATER (D50W) INTRAVENOUS SYRINGE
12.5-25 AS NEEDED
Status: DISCONTINUED | OUTPATIENT
Start: 2021-02-23 | End: 2021-02-24 | Stop reason: HOSPADM

## 2021-02-23 RX ORDER — SODIUM BICARBONATE 650 MG/1
650 TABLET ORAL 2 TIMES DAILY
Status: DISCONTINUED | OUTPATIENT
Start: 2021-02-23 | End: 2021-02-24 | Stop reason: HOSPADM

## 2021-02-23 RX ORDER — DEXTROSE MONOHYDRATE AND SODIUM CHLORIDE 5; .45 G/100ML; G/100ML
100 INJECTION, SOLUTION INTRAVENOUS CONTINUOUS
Status: DISCONTINUED | OUTPATIENT
Start: 2021-02-23 | End: 2021-02-24

## 2021-02-23 RX ADMIN — DEXTROSE AND SODIUM CHLORIDE 150 ML/HR: 5; 900 INJECTION, SOLUTION INTRAVENOUS at 01:18

## 2021-02-23 RX ADMIN — SODIUM BICARBONATE 650 MG: 650 TABLET ORAL at 21:31

## 2021-02-23 RX ADMIN — SODIUM POLYSTYRENE SULFONATE 45 G: 15 SUSPENSION ORAL; RECTAL at 08:12

## 2021-02-23 RX ADMIN — DEXTROSE AND SODIUM CHLORIDE 100 ML/HR: 5; 450 INJECTION, SOLUTION INTRAVENOUS at 21:31

## 2021-02-23 RX ADMIN — DEXTROSE AND SODIUM CHLORIDE 75 ML/HR: 5; 450 INJECTION, SOLUTION INTRAVENOUS at 09:16

## 2021-02-23 RX ADMIN — SODIUM BICARBONATE 650 MG: 650 TABLET ORAL at 09:16

## 2021-02-23 RX ADMIN — HEPARIN SODIUM 5000 UNITS: 5000 INJECTION INTRAVENOUS; SUBCUTANEOUS at 09:16

## 2021-02-23 RX ADMIN — CEFTRIAXONE 1 G: 1 INJECTION, POWDER, FOR SOLUTION INTRAMUSCULAR; INTRAVENOUS at 01:00

## 2021-02-23 RX ADMIN — DEXTROSE MONOHYDRATE 25 G: 25 INJECTION, SOLUTION INTRAVENOUS at 08:12

## 2021-02-23 RX ADMIN — DEXTROSE MONOHYDRATE 25 G: 25 INJECTION, SOLUTION INTRAVENOUS at 16:38

## 2021-02-23 RX ADMIN — PANTOPRAZOLE SODIUM 40 MG: 40 TABLET, DELAYED RELEASE ORAL at 08:12

## 2021-02-23 RX ADMIN — Medication 10 ML: at 21:31

## 2021-02-23 RX ADMIN — HYDRALAZINE HYDROCHLORIDE 20 MG: 20 INJECTION INTRAMUSCULAR; INTRAVENOUS at 11:58

## 2021-02-23 RX ADMIN — HEPARIN SODIUM 5000 UNITS: 5000 INJECTION INTRAVENOUS; SUBCUTANEOUS at 16:39

## 2021-02-23 RX ADMIN — DEXTROSE AND SODIUM CHLORIDE 100 ML/HR: 5; 450 INJECTION, SOLUTION INTRAVENOUS at 17:08

## 2021-02-23 NOTE — ED PROVIDER NOTES
49yo male with PMH diabetes, hypertension, nephrolithiasis, sarcoidosis, and recurrent nephrolithiasis sent to ED by urologist after pre-op evaluation revealed potassium 6.8 this morning. Patient currently has right sided stent in place and was scheduled for surgery 3/1. Creatinine 2.7, increased from 1.76 in January. Patient denies chest pain, flank pain, dysuria, palpitations, numbness/tingling of fingers and toes, nausea, and vomiting. States he is very hungry and thirsty after not having eaten all afternoon. Endorses hematuria since stent placement about 1 week ago. Spoke with urology Dr. Anthony Parry who reviewed CT scan and stated patient would not benefit from urgent procedure at this point. Advised admit patient to get kidney function and potassium level under control. Will carry on with procedure as planned for 3/1 once stabilized. Urology will see in the am.             Past Medical History:   Diagnosis Date    Autoimmune disease ([facility] [address]     Sarcoidosis    Cervical stenosis of spinal canal 5/13/2016    Diabetes ([facility] [address]     GERD (gastroesophageal reflux disease)     Hypertension     Ill-defined condition     Ill-defined condition     Tremors (mostly hands)    Liver disease     Sarcoid    Psychiatric disorder     Anxiety/Depression       Past Surgical History:   Procedure Laterality Date    HX CERVICAL FUSION      HX ORTHOPAEDIC      multi lumbar lami/fusions    HX TONSILLECTOMY      HX UROLOGICAL      Kidney Stones \"vacuumed\"    [place] ABDOMEN SURG[place] PROC UNLISTED  2013    liver biopsy         No family history on file.     Social History     Socioeconomic History    Marital status:      Spouse name: Not on file    Number of children: Not on file    Years of education: Not on file    Highest education level: Not on file   Occupational History    Not on file   Social Needs    Financial resource strain: Not on file    Food insecurity     Worry: Not on file     Inability: Not on file   [address] [facility] Transportation needs     Medical: Not on file     Non-medical: Not on file   Tobacco Use    Smoking status: Current Some Day Smoker    Smokeless tobacco: Never Used    Tobacco comment: occaisnal cigar   Substance and Sexual Activity    Alcohol use: Yes     Alcohol/week: 1.0 standard drinks     Types: 1 Glasses of wine per week     Comment: socially    Drug use: No    Sexual activity: Not on file   Lifestyle    Physical activity     Days per week: Not on file     Minutes per session: Not on file    Stress: Not on file   Relationships    Social connections     Talks on phone: Not on file     Gets together: Not on file     Attends [employer] service: Not on file     Active member of club or organization: Not on file     Attends meetings of clubs or organizations: Not on file     Relationship status: Not on file    Intimate partner violence     Fear of current or ex partner: Not on file     Emotionally abused: Not on file     Physically abused: Not on file     Forced sexual activity: Not on file   Other Topics Concern    Not on file   Social History Narrative    Not on file         ALLERGIES: Patient has no known allergies. Review of Systems   Constitutional: Negative for fatigue and fever. HENT: Negative for sore throat. Eyes: Negative for visual disturbance. Respiratory: Negative for cough and shortness of breath. Cardiovascular: Negative for chest pain and palpitations. Gastrointestinal: Positive for constipation. Negative for abdominal pain, diarrhea and nausea. Genitourinary: Positive for hematuria. Negative for dysuria and flank pain. Musculoskeletal: Negative for arthralgias, back pain and myalgias. Skin: Negative for rash. Neurological: Negative for dizziness, weakness, numbness and headaches. Psychiatric/Behavioral: Negative for confusion.        Vitals:    02/22/21 1734 02/22/21 2115 02/22/21 2130   BP: (!) 176/83 (!) 174/76 133/77   Pulse: 69     Resp: 18     Temp: 98.3 °F (36.8 °C)     SpO2: 100% 100% 100%   Weight: 88.5 kg (195 lb)     Height: 6' 2\" (1.88 m)              Physical Exam  Constitutional:       Appearance: He is normal weight. HENT:      Head: Normocephalic. Mouth/Throat:      Mouth: Mucous membranes are moist.      Pharynx: Oropharynx is clear. Eyes:      Extraocular Movements: Extraocular movements intact. Conjunctiva/sclera: Conjunctivae normal.   Neck:      Musculoskeletal: Normal range of motion. Cardiovascular:      Rate and Rhythm: Normal rate and regular rhythm. Pulses: Normal pulses. Pulmonary:      Effort: Pulmonary effort is normal.   Abdominal:      General: Abdomen is flat. Bowel sounds are normal.      Tenderness: There is no abdominal tenderness. Musculoskeletal: Normal range of motion. General: No swelling or tenderness. Skin:     General: Skin is warm and dry. Neurological:      General: No focal deficit present. Mental Status: He is alert. Psychiatric:         Mood and Affect: Mood normal.          [facility]        Perfect Serve Consult for Admission  10:30 PM    ED Room Number: ER13/13  Patient Name and age:  Tara Shaw 48 y.o.  male  Working Diagnosis:   1. Acute hyperkalemia    2. Elevated serum creatinine    3. Hypoglycemia        COVID-19 Suspicion:  no  Sepsis present:  no  Reassessment needed: no  Code Status:  Full Code  Readmission: no  Isolation Requirements:  no  Recommended Level of Care:  step down  Department:[facility] ED - [phone]  Other:  Hyperkalemia in the setting of worsening kidney function and hypoglycemia. Patient has not eaten much today.  No plan for urological procedure at this time- urology will see in am.      Procedures

## 2021-02-23 NOTE — CONSULTS
Consult dict    MATT due to bactrim/NSAIDs  Hyperkalemia  CKD3 likely due to sarcoidosis  B nephrolithiasis likely due to sarcoidosis  Sarcoidosis of the liver diagnosed several years ago    Rec:  Agree with kayexelate  IVF  Repeat labs  Low K diet  Renal function improving, probably will not need RRT  Would likely benefit from treatment of sarcoidosis with steroids once improved

## 2021-02-23 NOTE — PROGRESS NOTES
Urology Progress Note    Patient: Bertha Celis MRN: [id number]  SSN: [id number]    YOB: 1965  Age: 48 y.o. Sex: male        Assessment:     Bertha Celis is a 48 y.o. male admitted to the hospital on 2/22/2021 for ARF and preoperative potassium of 6.8. He is s/p right ureteral stent placement on 2/18/21 for 2 right ureteral stones. Plan:     1. Continue culture directed abx. 2. Monitor renal function, CT with stent in good position. 3. Hopefully can proceed with ureteroscopy on 3/1/2021 as scheduled. Reason For Visit:     Follow up for kidney stones    Interval History:     No complaints this morning. Creatinine improved this morning.      ROS:     [] Denies Fevers (General)  [] SOB (Respiratory)    [] Abdominal pain (GI)  [x] Hematuria  [] Dysuria  [x] Frequency (GI)    Objective:     Visit Vitals  BP (!) 130/106 (BP 1 Location: Left upper arm, BP Patient Position: At rest)   Pulse 76   Temp 97.7 °F (36.5 °C)   Resp 18   Ht 6' 2\" (1.88 m)   Wt 88.5 kg (195 lb)   SpO2 93%   BMI 25.04 kg/m²       No intake or output data in the 24 hours ending 02/23/21 0854    Physical Exam  General: NAD  Respiratory: no distress, room air  Abdomen: soft, no distention  : no CVA tenderness  Neuro: Appropriate, no focal neurological deficits    Labs reviewed, February 23, 2021  Recent Results (from the past 24 hour(s))   METABOLIC PANEL, BASIC    Collection Time: 02/22/21 10:59 AM   Result Value Ref Range    Sodium 135 (L) 136 - 145 mmol/L    Potassium 6.8 (HH) 3.5 - 5.1 mmol/L    Chloride 106 97 - 108 mmol/L    CO2 24 21 - 32 mmol/L    Anion gap 5 5 - 15 mmol/L    Glucose 98 65 - 100 mg/dL    BUN 43 (H) 6 - 20 MG/DL    Creatinine 2.50 (H) 0.70 - 1.30 MG/DL    BUN/Creatinine ratio 17 12 - 20      GFR est AA 33 (L) >60 ml/min/1.73m2    GFR est non-AA 27 (L) >60 ml/min/1.73m2    Calcium 9.6 8.5 - 10.1 MG/DL   URINALYSIS W/ REFLEX CULTURE    Collection Time: 02/22/21 11:05 AM    Specimen: Miscellaneous sample; Urine    Urine specimen   Result Value Ref Range    Color LANG      Appearance TURBID (A) CLEAR      Specific gravity 1.027 1.003 - 1.030      pH (UA) 5.5 5.0 - 8.0      Protein 300 (A) NEG mg/dL    Glucose Negative NEG mg/dL    Ketone 15 (A) NEG mg/dL    Blood LARGE (A) NEG      Urobilinogen 1.0 0.2 - 1.0 EU/dL    Nitrites Positive (A) NEG      Leukocyte Esterase LARGE (A) NEG      UA:UC IF INDICATED URINE CULTURE ORDERED (A) CNI      WBC >100 (H) 0 - 4 /hpf    RBC >100 (H) 0 - 5 /hpf    Epithelial cells FEW FEW /lpf    Bacteria Negative NEG /hpf    Hyaline cast 0-2 0 - 5 /lpf   BILIRUBIN, CONFIRM    Collection Time: 02/22/21 11:05 AM   Result Value Ref Range    Bilirubin UA, confirm Positive (A) NEG     EKG, 12 LEAD, INITIAL    Collection Time: 02/22/21 11:16 AM   Result Value Ref Range    Ventricular Rate 67 BPM    Atrial Rate 67 BPM    P-R Interval 172 ms    QRS Duration 136 ms    Q-T Interval 440 ms    QTC Calculation (Bezet) 464 ms    Calculated P Axis 8 degrees    Calculated R Axis -4 degrees    Calculated T Axis 18 degrees    Diagnosis       Normal sinus rhythm with sinus arrhythmia  Right bundle branch block  When compared with ECG of 2018, RBBB is now present. Confirmed by Smith ACUÑA, [address] [place] [address] ([id number]) on 2/22/2021 9:11:23 PM     EKG, 12 LEAD, INITIAL    Collection Time: 02/22/21  8:24 PM   Result Value Ref Range    Ventricular Rate 70 BPM    Atrial Rate 70 BPM    P-R Interval 190 ms    QRS Duration 148 ms    Q-T Interval 454 ms    QTC Calculation (Bezet) 490 ms    Calculated P Axis -2 degrees    Calculated R Axis -11 degrees    Diagnosis       Normal sinus rhythm  Right bundle branch block  Abnormal ECG  When compared with ECG of 22-FEB-2021 11:16,  No significant change was found     CBC WITH AUTOMATED DIFF    Collection Time: 02/22/21  8:33 PM   Result Value Ref Range    WBC 15.3 (H) 4.1 - 11.1 K/uL    RBC 5.49 4. 10 - 5.70 M/uL    HGB 14.7 12.1 - 17.0 g/dL    HCT 48.5 36.6 - 50.3 %    MCV 88.3 80.0 - 99.0 FL    MCH 26.8 26.0 - 34.0 PG    MCHC 30.3 30.0 - 36.5 g/dL    RDW 13.2 11.5 - 14.5 %    PLATELET 521 (H) 150 - 400 K/uL    MPV 10.2 8.9 - 12.9 FL    NRBC 0.0 0  WBC    ABSOLUTE NRBC 0.00 0.00 - 0.01 K/uL    NEUTROPHILS 81 (H) 32 - 75 %    LYMPHOCYTES 10 (L) 12 - 49 %    MONOCYTES 6 5 - 13 %    EOSINOPHILS 1 0 - 7 %    BASOPHILS 1 0 - 1 %    IMMATURE GRANULOCYTES 1 (H) 0.0 - 0.5 %    ABS. NEUTROPHILS 12.3 (H) 1.8 - 8.0 K/UL    ABS. LYMPHOCYTES 1.6 0.8 - 3.5 K/UL    ABS. MONOCYTES 1.0 0.0 - 1.0 K/UL    ABS. EOSINOPHILS 0.2 0.0 - 0.4 K/UL    ABS. BASOPHILS 0.2 (H) 0.0 - 0.1 K/UL    ABS. IMM. GRANS. 0.1 (H) 0.00 - 0.04 K/UL    DF AUTOMATED     TROPONIN I    Collection Time: 02/22/21  8:33 PM   Result Value Ref Range    Troponin-I, Qt. <0.05 <0.05 ng/mL   METABOLIC PANEL, COMPREHENSIVE    Collection Time: 02/22/21  8:33 PM   Result Value Ref Range    Sodium 134 (L) 136 - 145 mmol/L    Potassium 6.6 (HH) 3.5 - 5.1 mmol/L    Chloride 108 97 - 108 mmol/L    CO2 20 (L) 21 - 32 mmol/L    Anion gap 6 5 - 15 mmol/L    Glucose 40 (LL) 65 - 100 mg/dL    BUN 44 (H) 6 - 20 MG/DL    Creatinine 2.74 (H) 0.70 - 1.30 MG/DL    BUN/Creatinine ratio 16 12 - 20      GFR est AA 29 (L) >60 ml/min/1.73m2    GFR est non-AA 24 (L) >60 ml/min/1.73m2    Calcium 8.9 8.5 - 10.1 MG/DL    Bilirubin, total 0.4 0.2 - 1.0 MG/DL    ALT (SGPT) 22 12 - 78 U/L    AST (SGOT) 30 15 - 37 U/L    Alk. phosphatase 59 45 - 117 U/L    Protein, total 8.5 (H) 6.4 - 8.2 g/dL    Albumin 4.2 3.5 - 5.0 g/dL    Globulin 4.3 (H) 2.0 - 4.0 g/dL    A-G Ratio 1.0 (L) 1.1 - 2.2     BLOOD GAS, VENOUS    Collection Time: 02/22/21  9:53 PM   Result Value Ref Range    VENOUS PH 7.23 (L) 7.32 - 7.42      VENOUS PCO2 45.6 41 - 51 mmHg    VENOUS PO2 45 (H) 25 - 40 mmHg    VENOUS BICARBONATE 19 (L) 23 - 28 mmol/L    VENOUS BASE DEFICIT 8.8 mmol/L    VENOUS O2 SATURATION 73 65 - 88 %    O2 METHOD ROOM AIR      Sample source  VENOUS BLOOD      SITE OTHER     GLUCOSE, POC    Collection Time: 02/22/21 11:13 PM   Result Value Ref Range    Glucose (POC) 63 (L) 65 - 100 mg/dL    Performed by [facility]    MAGNESIUM    Collection Time: 02/23/21 12:01 AM   Result Value Ref Range    Magnesium 1.9 1.6 - 2.4 mg/dL   PHOSPHORUS    Collection Time: 02/23/21 12:01 AM   Result Value Ref Range    Phosphorus 3.6 2.6 - 4.7 MG/DL   METABOLIC PANEL, BASIC    Collection Time: 02/23/21 12:01 AM   Result Value Ref Range    Sodium 135 (L) 136 - 145 mmol/L    Potassium 5.6 (H) 3.5 - 5.1 mmol/L    Chloride 107 97 - 108 mmol/L    CO2 20 (L) 21 - 32 mmol/L    Anion gap 8 5 - 15 mmol/L    Glucose 181 (H) 65 - 100 mg/dL    BUN 42 (H) 6 - 20 MG/DL    Creatinine 2.63 (H) 0.70 - 1.30 MG/DL    BUN/Creatinine ratio 16 12 - 20      GFR est AA 31 (L) >60 ml/min/1.73m2    GFR est non-AA 26 (L) >60 ml/min/1.73m2    Calcium 11.3 (H) 8.5 - 10.1 MG/DL   MAGNESIUM    Collection Time: 02/23/21  3:30 AM   Result Value Ref Range    Magnesium 1.6 1.6 - 2.4 mg/dL   PHOSPHORUS    Collection Time: 02/23/21  3:30 AM   Result Value Ref Range    Phosphorus 3.6 2.6 - 4.7 MG/DL   METABOLIC PANEL, COMPREHENSIVE    Collection Time: 02/23/21  3:30 AM   Result Value Ref Range    Sodium 136 136 - 145 mmol/L    Potassium 6.0 (H) 3.5 - 5.1 mmol/L    Chloride 109 (H) 97 - 108 mmol/L    CO2 18 (L) 21 - 32 mmol/L    Anion gap 9 5 - 15 mmol/L    Glucose 103 (H) 65 - 100 mg/dL    BUN 41 (H) 6 - 20 MG/DL    Creatinine 2.30 (H) 0.70 - 1.30 MG/DL    BUN/Creatinine ratio 18 12 - 20      GFR est AA 36 (L) >60 ml/min/1.73m2    GFR est non-AA 30 (L) >60 ml/min/1.73m2    Calcium 8.8 8.5 - 10.1 MG/DL    Bilirubin, total 0.2 0.2 - 1.0 MG/DL    ALT (SGPT) 16 12 - 78 U/L    AST (SGOT) 21 15 - 37 U/L    Alk.  phosphatase 46 45 - 117 U/L    Protein, total 7.0 6.4 - 8.2 g/dL    Albumin 3.4 (L) 3.5 - 5.0 g/dL    Globulin 3.6 2.0 - 4.0 g/dL    A-G Ratio 0.9 (L) 1.1 - 2.2     GLUCOSE, POC    Collection Time: 02/23/21 8:05 AM   Result Value Ref Range    Glucose (POC) 40 (LL) 65 - 100 mg/dL    Performed by Laraine Prader, POC    Collection Time: 02/23/21  8:07 AM   Result Value Ref Range    Glucose (POC) 41 (LL) 65 - 100 mg/dL    Performed by Laraine Prader, POC    Collection Time: 02/23/21  8:23 AM   Result Value Ref Range    Glucose (POC) 99 65 - 100 mg/dL    Performed by Kavon Schmidt        Imaging:  CT Results  (Last 48 hours)               02/22/21 2154  CT ABD PELV WO CONT Final result    Impression:      1. Nonobstructing bilateral nephrolithiasis. No hydronephrosis. Right ureteral   stent is in good position. 2. Cholelithiasis. 3. Possible cirrhosis. 4. Cecal fecal burden may represent constipation. Recommend comparison with any previous outside CT imaging. Narrative:  EXAM: CT ABD PELV WO CONT       INDICATION: Nephrolithiasis. Right ureteral stent. Hyperkalemia. COMPARISON: Renal ultrasound on 1/2/2021. No comparison CT in the [facility]   system. CONTRAST:  None. TECHNIQUE:    Thin axial images were obtained through the abdomen and pelvis. Coronal and   sagittal reformats were generated. Oral contrast was not administered. CT dose   reduction was achieved through use of a standardized protocol tailored for this   examination and automatic exposure control for dose modulation. The absence of intravenous contrast material reduces the sensitivity for   evaluation of the vasculature and solid organs. FINDINGS:    LOWER THORAX: No significant abnormality in the incidentally imaged lower chest.   LIVER: Nodular surface. No evidence of mass. Subcapsular retraction and   calcification in segment 2 of the liver. BILIARY TREE: Nondistended gallbladder contains tiny gallstones. CBD is not   dilated. SPLEEN: Nodular surface, normal size. PANCREAS: No inflammation. ADRENALS: Unremarkable.    KIDNEYS/URETERS: 9 mm calculus in the lower pole of the left kidney. No left   hydronephrosis. Renal calculi are in the lower pole of the right kidney. Right   ureteral stent is in good position. No right hydronephrosis. STOMACH: Unremarkable. SMALL BOWEL: No dilatation or wall thickening. COLON: Mild diverticulosis. Moderate fecal burden in the cecum. No inflammation. APPENDIX: Normal. Retrocecal location. PERITONEUM: No ascites or pneumoperitoneum. RETROPERITONEUM: No lymphadenopathy or aortic aneurysm. REPRODUCTIVE ORGANS: Normal prostate gland size. URINARY BLADDER: Right ureteral stent terminates in the lumen of the   nondistended urinary bladder. BONES: L4-S1 fusion surgery. No aggressive bone lesion. ABDOMINAL WALL: No mass or hernia.    ADDITIONAL COMMENTS: N/A                 Signed By: Dayron Adame NP - February 23, 2021

## 2021-02-23 NOTE — PROGRESS NOTES
Drew Moreira [facility] [address]  [address] [facility], [address] [place] [address]  [phone]      Medical Progress Note      NAME: Janel Bishop   :  1965  MRM:  [id number]    Date/Time of service: 2021  8:51 AM       Subjective:     Chief Complaint:  Patient was personally seen and examined by me during this time period. Chart reviewed. Denied chest pain, SOB       Objective:       Vitals:       Last 24hrs VS reviewed since prior progress note.  Most recent are:    Visit Vitals  BP (!) 130/106 (BP 1 Location: Left upper arm, BP Patient Position: At rest)   Pulse 76   Temp 97.7 °F (36.5 °C)   Resp 18   Ht 6' 2\" (1.88 m)   Wt 88.5 kg (195 lb)   SpO2 93%   BMI 25.04 kg/m²     SpO2 Readings from Last 6 Encounters:   21 93%   21 99%   21 95%   16 93%        No intake or output data in the 24 hours ending 21 0851     Exam:     Physical Exam:    Gen:  Well-developed, well-nourished, in no acute distress  HEENT:  Pink conjunctivae, PERRL, hearing intact to voice, moist mucous membranes  Neck:  Supple, without masses, thyroid non-tender  Resp:  No accessory muscle use, clear breath sounds without wheezes rales or rhonchi  Card:  No murmurs, normal S1, S2 without thrills, bruits or peripheral edema  Abd:  Soft, non-tender, non-distended, normoactive bowel sounds are present  Musc:  No cyanosis or clubbing  Skin:  No rashes   Neuro:  Cranial nerves 3-12 are grossly intact, follows commands appropriately  Psych:  Good insight, oriented to person, place and time, alert    Medications Reviewed: (see below)    Lab Data Reviewed: (see below)    ______________________________________________________________________    Medications:     Current Facility-Administered Medications   Medication Dose Route Frequency    labetaloL (NORMODYNE;TRANDATE) 20 mg/4 mL (5 mg/mL) injection 20 mg  20 mg IntraVENous Q4H PRN    hydrALAZINE (APRESOLINE) 20 mg/mL injection 20 mg  20 mg IntraVENous Q4H PRN    melatonin (rapid dissolve) tablet 5 mg  5 mg Oral QHS PRN    alum-mag hydroxide-simeth (MYLANTA) oral suspension 30 mL  30 mL Oral Q4H PRN    LORazepam (ATIVAN) injection 0.5 mg  0.5 mg IntraVENous Q6H PRN    oxyCODONE IR (ROXICODONE) tablet 5 mg  5 mg Oral Q4H PRN    oxyCODONE IR (ROXICODONE) tablet 10 mg  10 mg Oral Q4H PRN    morphine injection 2 mg  2 mg IntraVENous Q4H PRN    diphenhydrAMINE (BENADRYL) injection 25 mg  25 mg IntraVENous Q6H PRN    diphenhydrAMINE (BENADRYL) capsule 25 mg  25 mg Oral Q6H PRN    albuterol (PROVENTIL VENTOLIN) nebulizer solution 2.5 mg  2.5 mg Nebulization Q4H PRN    simethicone (MYLICON) tablet 80 mg  80 mg Oral QID PRN    sodium chloride (NS) flush 5-40 mL  5-40 mL IntraVENous Q8H    sodium chloride (NS) flush 5-40 mL  5-40 mL IntraVENous PRN    acetaminophen (TYLENOL) tablet 650 mg  650 mg Oral Q6H PRN    Or    acetaminophen (TYLENOL) suppository 650 mg  650 mg Rectal Q6H PRN    polyethylene glycol (MIRALAX) packet 17 g  17 g Oral DAILY PRN    bisacodyL (DULCOLAX) suppository 10 mg  10 mg Rectal DAILY PRN    ondansetron (ZOFRAN) injection 4 mg  4 mg IntraVENous Q6H PRN    pantoprazole (PROTONIX) tablet 40 mg  40 mg Oral ACB    cefTRIAXone (ROCEPHIN) 1 g in sterile water (preservative free) 10 mL IV syringe  1 g IntraVENous Q24H    insulin lispro (HUMALOG) injection   SubCUTAneous AC&HS    glucose chewable tablet 16 g  4 Tab Oral PRN    dextrose (D50W) injection syrg 12.5-25 g  12.5-25 g IntraVENous PRN    glucagon (GLUCAGEN) injection 1 mg  1 mg IntraMUSCular PRN    sodium bicarbonate tablet 650 mg  650 mg Oral BID    dextrose 5 % - 0.45% NaCl infusion  75 mL/hr IntraVENous CONTINUOUS    sodium polystyrene (KAYEXALATE) 15 gram/60 mL oral suspension 30 g  30 g Oral NOW     Current Outpatient Medications   Medication Sig    omeprazole (PRILOSEC) 20 mg capsule Take 20 mg by mouth daily.     insulin glargine (LANTUS,BASAGLAR) 100 unit/mL (3 mL) inpn 20 Units by SubCUTAneous route every evening. 2000    ketorolac (TORADOL) 10 mg tablet Take 10 mg by mouth every six (6) hours as needed for Pain.  dextroamphetamine-amphetamine (AdderalL) 20 mg tablet Take 20 mg by mouth two (2) times daily as needed (Attention/Focus).  oxyCODONE-acetaminophen (Percocet) 5-325 mg per tablet Take 1 Tab by mouth every six (6) hours as needed for Pain (Severe pain- Has Rx at home).  propranolol LA (INDERAL LA) 60 mg SR capsule Take 60 mg by mouth daily.  dulaglutide (TRULICITY) 1.5 EL/7.6 mL sub-q pen 1.5 mg by SubCUTAneous route Every Saturday.  SITagliptin-metFORMIN (Janumet XR) 50-1,000 mg TM24 Take 1 Tab by mouth two (2) times daily (with meals).  buPROPion XL (Wellbutrin XL) 300 mg XL tablet Take 300 mg by mouth every morning.  sertraline (Zoloft) 50 mg tablet Take 50 mg by mouth daily.  fenofibric acid (TRILIPIX ER) 135 mg capsule Take 135 mg by mouth daily.  lisinopril (PRINIVIL, ZESTRIL) 10 mg tablet Take 10 mg by mouth daily.  ALPRAZolam (XANAX) 0.25 mg tablet Take 0.25 mg by mouth two (2) times daily as needed for Anxiety or Sleep.  glimepiride (AMARYL) 2 mg tablet Take 2 mg by mouth Daily (before breakfast).           Lab Review:     Recent Labs     02/22/21 2033   WBC 15.3*   HGB 14.7   HCT 48.5   *     Recent Labs     02/23/21  0330 02/23/21  0001 02/22/21 2033    135* 134*   K 6.0* 5.6* 6.6*   * 107 108   CO2 18* 20* 20*   * 181* 40*   BUN 41* 42* 44*   CREA 2.30* 2.63* 2.74*   CA 8.8 11.3* 8.9   MG 1.6 1.9  --    PHOS 3.6 3.6  --    ALB 3.4*  --  4.2   TBILI 0.2  --  0.4   ALT 16  --  22     Lab Results   Component Value Date/Time    Glucose (POC) 99 02/23/2021 08:23 AM    Glucose (POC) 41 (LL) 02/23/2021 08:07 AM    Glucose (POC) 40 (LL) 02/23/2021 08:05 AM    Glucose (POC) 63 (L) 02/22/2021 11:13 PM    Glucose (POC) 248 (H) 01/02/2021 10:55 AM          Assessment / Plan:     47 yo hx of HTN, DM, sarcoidosis, bilateral nephrolithiasis s/p R ureteral stent, presented w/ MATT, hyperkalemia, hypoglycemia, UTI    1) MATT: likely due to bactrim, NSAIDS. Will d/c bactrim. Hold ACEi. Cont IVF, monitor BMP. Renal to see    2) Acute hyperkalemia: due to MATT, bactrim. Will monitor on tele. Give insulin/glucose, IV calcium, bicarb, albuterol neb prn. Give kayexalate this AM.  Cont to monitor BMP closely    3) Complicated UTI due to ureteral stent: check urine Cx. Start IV CTX    4) DM type 2 w/ hypoglycemia: check A1C. Hold home insulin. Cont D5 gtt, use SSI prn    5) Bilateral nephrolithiasis: s/p R ureteral stent. Plan for outpatient procedure on 03/01.   Defer to Urology     Total time spent with patient: 39 Minutes **I personally saw and examined the patient during this time period**                 Care Plan discussed with: Patient, nursing     Discussed:  Care Plan    Prophylaxis:  Hep SQ    Disposition:  Home w/Family           ___________________________________________________    Attending Physician: Jonas Dickerson MD

## 2021-02-23 NOTE — PROGRESS NOTES
Shift Change:    Bedside and Verbal shift change report given to BRIAN Browne (oncoming nurse) by Arcenio Alberto RN (offgoing nurse). Report included the following information SBAR, Kardex, Intake/Output, MAR and Recent Results. 4137: Patient refusing cardiac monitor     0835: BG 40, recheck 41. Gave 25g D50 and recheck BG 99. Dr. Bruno Laird notified and adding orders     1000: Sent another CBC due to possible dilution of previous lab sent     1153: Patient BP elevated and c/o dizziness. Notified Dr. Bruno Laird     21 : BG 41, recheck 38. 25g D50 IV given. Dr. Bruno Laird aware       TRANSFER - OUT REPORT:    Verbal report given to Chaka Pete RN(name) on Dominique Santos  being transferred to 5th floor(unit) for routine progression of care       Report consisted of patients Situation, Background, Assessment and   Recommendations(SBAR). Information from the following report(s) SBAR, Kardex, Intake/Output, MAR, Recent Results and Cardiac Rhythm NSR w PVCs was reviewed with the receiving nurse. Lines:   Peripheral IV 02/22/21 Right Forearm (Active)        Opportunity for questions and clarification was provided.       Patient transported with:   Leetchi

## 2021-02-23 NOTE — PROGRESS NOTES
[facility]: MED RECONCILIATION    Comments/Recommendations:    Reviewed PTA medications with patient at bedside who is a fair historian regarding PTA medications- pharmacist referenced medication list with prescription fill history to confirm doses   Reviewed [facility] to confirm narcotic Rx   Of note- Patient admitted with ARF and hyperkalemia which may be worsened by current mediation therapy. Patient reports taking lisinopril 10 mg, ketorolac 10 mg, bactrim DS tablet within past 24 hrs. NSAIDS plus ACEI in combination may cause/worsen renal failure and bactrim is known to cause hyperkalemia, especially in the setting of acute renal failure. Counseled patient to avoid NSAIDS in the future if able and use alternative OTC medication such as acetaminophen. Information obtained from: Patient, RxQuery, Chart review      Allergies: Patient has no known allergies. Prior to Admission Medications:     Medication Documentation Review Audit     Reviewed by Jen Adam PHARMD (Pharmacist) on 02/22/21 at 200    Medication Sig Documenting Provider Last Dose Status Taking? ALPRAZolam (XANAX) 0.25 mg tablet Take 0.25 mg by mouth two (2) times daily as needed for Anxiety or Sleep. Provider, Historical 2/15/2021 Active Yes   buPROPion XL (Wellbutrin XL) 300 mg XL tablet Take 300 mg by mouth every morning. Other, MD Maria Teresa 2/22/2021 Active Yes   dextroamphetamine-amphetamine (AdderalL) 20 mg tablet Take 20 mg by mouth two (2) times daily as needed (Attention/Focus). Provider, Historical 2/15/2021 Active Yes   dulaglutide (TRULICITY) 1.5 RF/3.1 mL sub-q pen 1.5 mg by SubCUTAneous route Every Saturday. Provider, Historical 2/20/2021 Active Yes   fenofibric acid (TRILIPIX ER) 135 mg capsule Take 135 mg by mouth daily. Provider, Historical 2/22/2021 Active Yes   glimepiride (AMARYL) 2 mg tablet Take 2 mg by mouth Daily (before breakfast).  Provider, Historical 2/22/2021 Active Yes   insulin glargine (LANTUS,BASAGLAR) 100 unit/mL (3 mL) inpn 20 Units by SubCUTAneous route every evening. 2000 Provider, Historical 2/21/2021 Active Yes   ketorolac (TORADOL) 10 mg tablet Take 10 mg by mouth every six (6) hours as needed for Pain. Provider, Historical 2/21/2021 Active Yes   lisinopril (PRINIVIL, ZESTRIL) 10 mg tablet Take 10 mg by mouth daily. Provider, Historical 2/22/2021 Active Yes   omeprazole (PRILOSEC) 20 mg capsule Take 20 mg by mouth daily. Provider, Historical 2/22/2021 Active Yes   oxyCODONE-acetaminophen (Percocet) 5-325 mg per tablet Take 1 Tab by mouth every six (6) hours as needed for Pain (Severe pain- Has Rx at home). Provider, Historical 2/15/2021 Active Yes   propranolol LA (INDERAL LA) 60 mg SR capsule Take 60 mg by mouth daily. Provider, Historical 2/22/2021 Active Yes   sertraline (Zoloft) 50 mg tablet Take 50 mg by mouth daily. Other, MD Maria Teresa 2/22/2021 Active Yes   SITagliptin-metFORMIN (Janumet XR) 50-1,000 mg TM24 Take 1 Tab by mouth two (2) times daily (with meals). Provider, Historical 2/22/2021 Active Yes   trimethoprim-sulfamethoxazole (BACTRIM DS, SEPTRA DS) 160-800 mg per tablet Take 1 Tab by mouth two (2) times a day.  Provider, Historical 2/22/2021 Active Yes              Tere Centeno, PHARMD   Contact: [phone]

## 2021-02-23 NOTE — CONSULTS
Urology Consult    Patient: Britton Calles MRN: [id number]  SSN: [id number]    YOB: 1965  Age: 48 y.o. Sex: male          Date of Consultation:  February 22, 2021  Requesting Physician: Abilio Park MD  Reason for Consultation:    Pre-existing [facility] Urology Patient:   Physician:         History of Present Illness:  Patient is a 48 y.o. male admitted 2/22/2021 to the hospital for acute renal failure and preoperative potassium noted at 6.8. A CT has just been performed and shows nonobstructing left lower pole stone and right stent in good position without hydronephrosis. Past Medical History:  No Known Allergies   Prior to Admission medications    Medication Sig Start Date End Date Taking? Authorizing Provider   atorvastatin (Lipitor) 20 mg tablet Take 20 mg by mouth daily. Provider, Historical   omeprazole (PRILOSEC) 20 mg capsule Take 20 mg by mouth daily. Provider, Historical   propranolol HCl (INDERAL PO) Take  by mouth. Provider, Historical   insulin glargine (LANTUS,BASAGLAR) 100 unit/mL (3 mL) inpn 20 Units by SubCUTAneous route every evening. 2000    Provider, Historical   trimethoprim-sulfamethoxazole (BACTRIM DS, SEPTRA DS) 160-800 mg per tablet Take 1 Tab by mouth two (2) times a day. Provider, Historical   ketorolac (TORADOL) 10 mg tablet Take 10 mg by mouth every six (6) hours as needed for Pain. Provider, Historical   SITagliptin-metFORMIN (JANUMET)  mg per tablet Take 1 Tab by mouth two (2) times daily (with meals). Provider, Historical   buPROPion XL (Wellbutrin XL) 300 mg XL tablet Take 300 mg by mouth every morning. Other, MD Maria Teresa   dulaglutide (TRULICITY SC) 1.5 mg by SubCUTAneous route every seven (7) days. Other, MD Maria Teresa   sertraline (Zoloft) 50 mg tablet Take  by mouth daily. Sai, MD Maria Teresa   fenofibric acid (TRILIPIX ER) 135 mg capsule Take  by mouth daily.     Provider, Historical   dextroamphetamine-amphetamine (ADDERALL) 10 mg tablet Take 20 mg by mouth. Provider, Historical   lisinopril (PRINIVIL, ZESTRIL) 10 mg tablet Take  by mouth daily. Provider, Historical   ALPRAZolam (XANAX) 0.25 mg tablet Take  by mouth three (3) times daily as needed. Provider, Historical   glimepiride (AMARYL) 2 mg tablet Take  by mouth every morning. Provider, Historical      PMHx:  has a past medical history of Autoimmune disease ([facility] [address] Cervical stenosis of spinal canal (2016), Diabetes ([facility] [address] GERD (gastroesophageal reflux disease), Hypertension, Ill-defined condition, Ill-defined condition, Liver disease, and Psychiatric disorder. PSurgHx:  has a past surgical history that includes hx orthopaedic; hx tonsillectomy; pr abdomen surgery proc unlisted (); hx cervical fusion; and hx urological.  PSocHx:  reports that he has been smoking. He has never used smokeless tobacco. He reports current alcohol use of about 1.0 standard drinks of alcohol per week. He reports that he does not use drugs. ROS:  Admission ROS by No admitting provider for patient encounter. from 2021 were reviewed with the patient and changes (other than per HPI) include: none. Physical Exam:            Vitals[de-identified]    Temp (24hrs), Av.2 °F (36.2 °C), Min:96 °F (35.6 °C), Max:98.3 °F (36.8 °C)   Blood pressure 133/77, pulse 69, temperature 98.3 °F (36.8 °C), resp. rate 18, height 6' 2\" (1.88 m), weight 88.5 kg (195 lb), SpO2 100 %. I&O's:    No intake/output data recorded.         Lab Results   Component Value Date/Time    WBC 15.3 (H) 2021 08:33 PM    HCT 48.5 2021 08:33 PM    PLATELET 838 (H)  08:33 PM    Sodium 134 (L) 2021 08:33 PM    Potassium 6.6 (HH) 2021 08:33 PM    Chloride 108 2021 08:33 PM    CO2 20 (L) 2021 08:33 PM    BUN 44 (H) 2021 08:33 PM    Creatinine 2.74 (H) 2021 08:33 PM    Glucose 40 (LL) 2021 08:33 PM    Calcium 8.9 2021 08:33 PM       UA: Lab Results   Component Value Date/Time    Color LANG 02/22/2021 11:05 AM    Appearance TURBID (A) 02/22/2021 11:05 AM    Specific gravity 1.027 02/22/2021 11:05 AM    pH (UA) 5.5 02/22/2021 11:05 AM    Protein 300 (A) 02/22/2021 11:05 AM    Glucose Negative 02/22/2021 11:05 AM    Ketone 15 (A) 02/22/2021 11:05 AM    Bilirubin Negative 01/02/2021 10:15 AM    Urobilinogen 1.0 02/22/2021 11:05 AM    Nitrites Positive (A) 02/22/2021 11:05 AM    Leukocyte Esterase LARGE (A) 02/22/2021 11:05 AM    Epithelial cells FEW 02/22/2021 11:05 AM    Bacteria Negative 02/22/2021 11:05 AM    WBC >100 (H) 02/22/2021 11:05 AM    RBC >100 (H) 02/22/2021 11:05 AM       Cultures:   Xrays: CT images pesonally reviewed, concur with right stent in good position and non-obstruction left lower pole stone    Assessment/Plan:     1. No obstruction to account for worsening renal function and increasing hyperkalemia. Will inform Dr Gael Go of patients admission. Hopefully can still target right ureteroscopy in near future once acute(?diabetic nephropathy) renal failure stabilized.     Signed By: Val Reveles MD  - February 22, 2021

## 2021-02-23 NOTE — ED NOTES
Bedside and Verbal shift change report given to Pavan RN (oncoming nurse) by France Santos RN (offgoing nurse). Report included the following information SBAR, Kardex, ED Summary, [facility] and Recent Results.

## 2021-02-23 NOTE — CONSULTS
[address]     Name:  Lita Moon  MR#:  [id number]  :  1965  ACCOUNT #:  [de-identified]  DATE OF SERVICE:  2021      NEPHROLOGY CONSULTATION    REQUESTING PHYSICIAN:  Dr. Vickie Perkins:  Elevated creatinine and hyperkalemia. HISTORY OF PRESENT ILLNESS:  The patient is a 81-year-old white male, who has history of chronic kidney disease stage III. He saw my partner, Dr. Sulma Sen back in 2019 but has been lost to follow up. He has a prior history of hepatic sarcoidosis reportedly diagnosed by biopsy several years ago and has not received any treatment. His creatinine in 2019 was 1.4 and as of 2021 the creatinine was 1.8. He has bilateral nephrolithiasis and has recently undergone ureteral stenting and was to have surgery on 2021. He has been taking Toradol for pain and Bactrim for an associated UTI. He came in for preoperative testing and his creatinine was 2.5, potassium was 6.8, and he was told to come to the emergency room. In the emergency room, he has received fluids and Kayexalate. Potassium is down to 6 and creatinine down to 2.3. REVIEW OF SYSTEMS:  CONSTITUTIONAL:  No fevers or chills. GI:  No nausea or vomiting. :  He is passing dark urine. He had some flank pain associated with his stones which has improved. CARDIOVASCULAR:  No chest pain or shortness of breath. All other systems reviewed and are negative except as per history of present illness. PAST MEDICAL HISTORY:  Sarcoidosis, hypertension, type 2 diabetes mellitus, nephrolithiasis. FAMILY HISTORY:  His father had end-stage renal disease of uncertain cause and was on dialysis. SOCIAL HISTORY:  He occasionally smokes. PHYSICAL EXAMINATION:  VITAL SIGNS:  Temperature 98.3, pulse 63, blood pressure 165/94. GENERAL:  Alert white male, in no acute distress. EYES:  Anicteric. Extraocular movements intact.   ENMT:  Mucous membranes are moist.  There is no epistaxis. NECK:  Nontender. There is no JVD. HEART:  Regular. There is no lower extremity edema. RESPIRATORY:  Lungs are clear with good effort. GI:  Abdomen soft, nontender. Bowel sounds are active. There is no guarding, no rebound. SKIN:  Warm with normal turgor. There is no rash. MUSCULOSKELETAL:  There is no cyanosis or clubbing. There is no synovitis in fingers or wrists bilaterally. LABORATORY DATA:  Sodium 136, potassium 6.0, chloride 109, bicarb 18, BUN 41, creatinine 2.3. RADIOGRAPHIC STUDIES:  CT scan of the abdomen and pelvis shows nonobstructing bilateral nephrolithiasis and a right ureteral stent. ASSESSMENT:  1. Acute renal failure due to Bactrim and Toradol and possibly volume depletion. 2.  Hyperkalemia. 3.  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 4.  Bilateral nonobstructing nephrolithiasis again likely related to his sarcoidosis. PLAN:  1. Continue IV fluids as you are doing. 2.  Agree with Kayexalate. Followup, repeat labs, and treat potassium medically as needed. With improving renal function, do not anticipate he will require dialysis. 3.  Avoid NSAIDs, Bactrim, and hold his ACE inhibitor. 4.  Urology to manage stent and plans for surgery. Hopefully, he can improve medically and have his procedure done as planned. 5.  Ultimately, he would benefit from therapy for sarcoidosis most likely. Typical therapy would involve at least high-dose steroids and he would likely have worsening of his glycemic control, so it is a bit of a complicated issue. I think we can defer initiation of steroids for awhile while he gets through this current illness, but I discussed with him that treatment of his underlying sarcoid could help prevent some of the complications he is currently experiencing.       MD CORINA [id number]  D:  02/23/2021 12:15  T:  02/23/2021 16:23  JOB #:  [id number]

## 2021-02-23 NOTE — H&P
[facility]  [address] [facility] [address] [facility], [facility] [address]  [phone]     Hospitalist Admission Note      NAME: Carmel Toribio   :  1965   MRN:  [id number]     Date/Time:  2021 12:08 AM    Patient PCP: None    Emergency Contact:    Extended Emergency Contact Information  Primary Emergency Contact: [address] [facility] , [address] Phone: [phone]  Mobile Phone: [phone]  Relation: Spouse      Code: Full Code    Isolation Precautions: There are currently no Active Isolations        Subjective:     CHIEF COMPLAINT: Elevated potassium    HISTORY OF PRESENT ILLNESS:     Mr. [address] [facility]  is a 48 y.o. male with history that includes sarcoidosis, DM type II, and recurrent nephrolithiasis who underwent right-sided urethral stenting about 3 weeks ago with UTI and was placed on Bactrim DS was instructed by his urologist to come to the ER because of his renal function and elevated potassium level. Earlier he had a potassium of 6.8 and when he came to the ER was 6.6. He was given calcium gluconate along with dextrose and followed by insulin. I asked the resident to give Kayexalate which she did. Patient's blood sugar may have dropped because of the insulin given in the ER and he was given dextrose and put on a D5 drip. EKG did not show peaked T waves and the patient was otherwise asymptomatic. His BUN/creatinine had bumped up. The patient had a leukocytosis but no fever. No Known Allergies    Prior to Admission medications    Medication Sig Start Date End Date Taking? Authorizing Provider   omeprazole (PRILOSEC) 20 mg capsule Take 20 mg by mouth daily. Yes Provider, Historical   insulin glargine (LANTUS,BASAGLAR) 100 unit/mL (3 mL) inpn 20 Units by SubCUTAneous route every evening.    Yes Provider, Historical   trimethoprim-sulfamethoxazole (BACTRIM DS, SEPTRA DS) 160-800 mg per tablet Take 1 Tab by mouth two (2) times a day.  21 Yes Provider, Historical ketorolac (TORADOL) 10 mg tablet Take 10 mg by mouth every six (6) hours as needed for Pain. Yes Provider, Historical   dextroamphetamine-amphetamine (AdderalL) 20 mg tablet Take 20 mg by mouth two (2) times daily as needed (Attention/Focus). Yes Provider, Historical   oxyCODONE-acetaminophen (Percocet) 5-325 mg per tablet Take 1 Tab by mouth every six (6) hours as needed for Pain (Severe pain- Has Rx at home). Yes Provider, Historical   propranolol LA (INDERAL LA) 60 mg SR capsule Take 60 mg by mouth daily. Yes Provider, Historical   dulaglutide (TRULICITY) 1.5 RX/8.7 mL sub-q pen 1.5 mg by SubCUTAneous route Every Saturday. Yes Provider, Historical   SITagliptin-metFORMIN (Janumet XR) 50-1,000 mg TM24 Take 1 Tab by mouth two (2) times daily (with meals). Yes Provider, Historical   buPROPion XL (Wellbutrin XL) 300 mg XL tablet Take 300 mg by mouth every morning. Yes Other, MD Maria Teresa   sertraline (Zoloft) 50 mg tablet Take 50 mg by mouth daily. Yes Other, MD Maria Teresa   fenofibric acid (TRILIPIX ER) 135 mg capsule Take 135 mg by mouth daily. Yes Provider, Historical   lisinopril (PRINIVIL, ZESTRIL) 10 mg tablet Take 10 mg by mouth daily. Yes Provider, Historical   ALPRAZolam (XANAX) 0.25 mg tablet Take 0.25 mg by mouth two (2) times daily as needed for Anxiety or Sleep. Yes Provider, Historical   glimepiride (AMARYL) 2 mg tablet Take 2 mg by mouth Daily (before breakfast).    Yes Provider, Historical       Past Medical History:   Diagnosis Date    Autoimmune disease ([address]     Sarcoidosis    Cervical stenosis of spinal canal 5/13/2016    Diabetes ([facility] [address]     GERD (gastroesophageal reflux disease)     Hypertension     Ill-defined condition     Ill-defined condition     Tremors (mostly hands)    Liver disease     Sarcoid    Psychiatric disorder     Anxiety/Depression        Past Surgical History:   Procedure Laterality Date    HX CERVICAL FUSION      HX ORTHOPAEDIC      multi lumbar lami/fusions    HX TONSILLECTOMY      HX UROLOGICAL      Kidney Stones \"vacuumed\"    LA ABDOMEN SURGERY PROC UNLISTED  2013    liver biopsy       Social History     Tobacco Use    Smoking status: Current Some Day Smoker    Smokeless tobacco: Never Used    Tobacco comment: occaisnal cigar   Substance Use Topics    Alcohol use: Yes     Alcohol/week: 1.0 standard drinks     Types: 1 Glasses of wine per week     Comment: socially        No family history on file. [address] and medications were reviewed. Review of Systems (14 point ROS):  (bold if positive, if negative)    Gen:   , , , , , Eyes:  , , ENT:   , , , , CVS:   , , , , , , , Pulm: , , , , GI:       , , , , , , , :     , , , , MS:     , , , Skin:   , , , , Psy:    , , , , , , Endo: , , , Hem:  , , , El:   , , , , Manuel:   , , , , , , ,         Objective:      Visit Vitals  /77   Pulse 69   Temp 98.3 °F (36.8 °C)   Resp 18   Ht 6' 2\" (1.88 m)   Wt 88.5 kg (195 lb)   SpO2 100%   BMI 25.04 kg/m²       Exam:     Physical Exam:    General:  Alert, cooperative, NAD  Head: Normocephalic, atraumatic  Eyes: PERRL and EOMI sclera clear  ENT: Lips, mucosa, and tongue normal.   Neck: supple, no tenderness  Lungs:  CTA with good BS and normal effort  Heart: S1-S2, RRR   Abd: SNTBS(+)  Ext: no cyanosis, no edema    Pulses: 2+ and symmetric  Skin: Skin color, texture, turgor normal. No rashes or lesions  Neuro: Alert and oriented x 4  Psych: Not anxious, cooperative, normal affect    Labs:    Recent Labs     02/22/21 2033   WBC 15.3*   HGB 14.7   HCT 48.5   *     Recent Labs     02/22/21 2033   *   K 6.6*      CO2 20*   GLU 40*   BUN 44*   CREA 2.74*   CA 8.9   ALB 4.2   TBILI 0.4   ALT 22     Lab Results   Component Value Date/Time    Glucose (POC) 63 (L) 02/22/2021 11:13 PM    Glucose (POC) 248 (H) 01/02/2021 10:55 AM     No results for input(s): PH, PCO2, PO2, HCO3, FIO2 in the last 72 hours.   No results for input(s): INR, INREXT in the last 72 hours. Radiology and EKG reviewed:     Ct Abd Pelv Wo Cont    Result Date: 2/22/2021  1. Nonobstructing bilateral nephrolithiasis. No hydronephrosis. Right ureteral stent is in good position. 2. Cholelithiasis. 3. Possible cirrhosis. 4. Cecal fecal burden may represent constipation. Recommend comparison with any previous outside CT imaging. I personally reviewed and interpreted the imaging studies and agree with official reading. **Chart reviewed in [facility]**       Assessment/Plan:      Acute hyperkalemia (2/23/2021) / MATT (acute kidney injury) ([facility] [address] (2/23/2021) likely induced by Bactrim in combination with his home lisinopril: We will admit patient for treatment of acute hyperkalemia, MATT, and possible UTI.  patient given initial treatment in the ER and as mentioned Kayexalate was ordered. IVF and supportive therapy. We will be monitoring labs and give additional Kayexalate as needed. I have consulted nephrology to assist with work-up and management of these 2 issues. Avoid nephrotoxic agents. UTI (urinary tract infection) / (2/23/2021) / Leukocytosis (2/23/2021) / Reactive thrombocytosis (2/23/2021): Urine culture ordered but given recent use of Bactrim may not yield anything but will go ahead and start patient on Rocephin given the leukocytosis. HTN (hypertension) (2/23/2021): As needed BP meds given. Patient's lisinopril will not be ordered at this time due to hyperkalemia and his MATT. Hypoglycemia (2/23/2021): Likely as a result of the potassium treatment with insulin. Continue with hypoglycemic protocol. Continue with D5 normal saline drip. DM (diabetes mellitus) type 2: Accu-Cheks with ISS per protocol. Recurrent nephrolithiasis (2/23/2021): Urology aware of patient.     Body mass index is 25.04 kg/m².: 25.0 - 29.9 Overweight        Risk of deterioration: high      Total time spent with patient: [address] Minutes **I personally saw and examined the patient during this time period**    Discussed:  Code Status and Care Plan discussed with:  [x]Patient  []Family  []Care Giver  [x]ED Doc  [x]RN  []Specialist  []Care Manager     Prophylaxis:  SCD's    Probable Disposition:  Home w/Family    Patient was seen:  2/23/2021                ___________________________________________________    Admitting Physician: Flori Yu MD

## 2021-02-24 VITALS
HEART RATE: 88 BPM | TEMPERATURE: 98 F | WEIGHT: 195 LBS | RESPIRATION RATE: 16 BRPM | HEIGHT: 74 IN | SYSTOLIC BLOOD PRESSURE: 117 MMHG | DIASTOLIC BLOOD PRESSURE: 81 MMHG | OXYGEN SATURATION: 96 % | BODY MASS INDEX: 25.03 KG/M2

## 2021-02-24 LAB
ANION GAP SERPL CALC-SCNC: 9 MMOL/L (ref 5–15)
BACTERIA SPEC CULT: NORMAL
BUN SERPL-MCNC: 22 MG/DL (ref 6–20)
BUN/CREAT SERPL: 12 (ref 12–20)
CALCIUM SERPL-MCNC: 8.2 MG/DL (ref 8.5–10.1)
CHLORIDE SERPL-SCNC: 108 MMOL/L (ref 97–108)
CO2 SERPL-SCNC: 20 MMOL/L (ref 21–32)
CREAT SERPL-MCNC: 1.83 MG/DL (ref 0.7–1.3)
EPO SERPL-ACNC: 1.8 MIU/ML (ref 2.6–18.5)
ERYTHROCYTE [DISTWIDTH] IN BLOOD BY AUTOMATED COUNT: 13.4 % (ref 11.5–14.5)
GLUCOSE BLD STRIP.AUTO-MCNC: 181 MG/DL (ref 65–100)
GLUCOSE SERPL-MCNC: 181 MG/DL (ref 65–100)
HCT VFR BLD AUTO: 38.7 % (ref 36.6–50.3)
HGB BLD-MCNC: 12.2 G/DL (ref 12.1–17)
MAGNESIUM SERPL-MCNC: 1.8 MG/DL (ref 1.6–2.4)
MCH RBC QN AUTO: 26.8 PG (ref 26–34)
MCHC RBC AUTO-ENTMCNC: 31.5 G/DL (ref 30–36.5)
MCV RBC AUTO: 85.1 FL (ref 80–99)
NRBC # BLD: 0 K/UL (ref 0–0.01)
NRBC BLD-RTO: 0 PER 100 WBC
PHOSPHATE SERPL-MCNC: 3.4 MG/DL (ref 2.6–4.7)
PLATELET # BLD AUTO: 355 K/UL (ref 150–400)
PMV BLD AUTO: 10.6 FL (ref 8.9–12.9)
POTASSIUM SERPL-SCNC: 4.6 MMOL/L (ref 3.5–5.1)
RBC # BLD AUTO: 4.55 M/UL (ref 4.1–5.7)
SERVICE CMNT-IMP: ABNORMAL
SERVICE CMNT-IMP: NORMAL
SODIUM SERPL-SCNC: 137 MMOL/L (ref 136–145)
WBC # BLD AUTO: 8.9 K/UL (ref 4.1–11.1)

## 2021-02-24 PROCEDURE — 74011250636 HC RX REV CODE- 250/636: Performed by: INTERNAL MEDICINE

## 2021-02-24 PROCEDURE — 80048 BASIC METABOLIC PNL TOTAL CA: CPT

## 2021-02-24 PROCEDURE — 99218 HC RM OBSERVATION: CPT

## 2021-02-24 PROCEDURE — 83735 ASSAY OF MAGNESIUM: CPT

## 2021-02-24 PROCEDURE — G0378 HOSPITAL OBSERVATION PER HR: HCPCS

## 2021-02-24 PROCEDURE — 74011000250 HC RX REV CODE- 250: Performed by: INTERNAL MEDICINE

## 2021-02-24 PROCEDURE — 96376 TX/PRO/DX INJ SAME DRUG ADON: CPT

## 2021-02-24 PROCEDURE — 74011250637 HC RX REV CODE- 250/637: Performed by: HOSPITALIST

## 2021-02-24 PROCEDURE — 96372 THER/PROPH/DIAG INJ SC/IM: CPT

## 2021-02-24 PROCEDURE — 84100 ASSAY OF PHOSPHORUS: CPT

## 2021-02-24 PROCEDURE — 74011250637 HC RX REV CODE- 250/637: Performed by: INTERNAL MEDICINE

## 2021-02-24 PROCEDURE — 2709999900 HC NON-CHARGEABLE SUPPLY

## 2021-02-24 PROCEDURE — 36415 COLL VENOUS BLD VENIPUNCTURE: CPT

## 2021-02-24 PROCEDURE — 82962 GLUCOSE BLOOD TEST: CPT

## 2021-02-24 PROCEDURE — 85027 COMPLETE CBC AUTOMATED: CPT

## 2021-02-24 RX ORDER — CEFDINIR 300 MG/1
300 CAPSULE ORAL 2 TIMES DAILY
Qty: 10 CAP | Refills: 0 | Status: SHIPPED | OUTPATIENT
Start: 2021-02-24 | End: 2021-03-01

## 2021-02-24 RX ORDER — SODIUM CHLORIDE 9 MG/ML
75 INJECTION, SOLUTION INTRAVENOUS CONTINUOUS
Status: DISCONTINUED | OUTPATIENT
Start: 2021-02-24 | End: 2021-02-24 | Stop reason: HOSPADM

## 2021-02-24 RX ORDER — INSULIN GLARGINE 100 [IU]/ML
10 INJECTION, SOLUTION SUBCUTANEOUS EVERY EVENING
Qty: 1 PEN | Refills: 0 | Status: SHIPPED
Start: 2021-02-24 | End: 2021-03-09 | Stop reason: SDUPTHER

## 2021-02-24 RX ADMIN — HEPARIN SODIUM 5000 UNITS: 5000 INJECTION INTRAVENOUS; SUBCUTANEOUS at 00:18

## 2021-02-24 RX ADMIN — SODIUM BICARBONATE 650 MG: 650 TABLET ORAL at 09:13

## 2021-02-24 RX ADMIN — HEPARIN SODIUM 5000 UNITS: 5000 INJECTION INTRAVENOUS; SUBCUTANEOUS at 09:14

## 2021-02-24 RX ADMIN — CEFTRIAXONE 1 G: 1 INJECTION, POWDER, FOR SOLUTION INTRAMUSCULAR; INTRAVENOUS at 00:17

## 2021-02-24 RX ADMIN — SODIUM CHLORIDE 75 ML/HR: 9 INJECTION, SOLUTION INTRAVENOUS at 09:14

## 2021-02-24 RX ADMIN — Medication 10 ML: at 06:35

## 2021-02-24 RX ADMIN — PANTOPRAZOLE SODIUM 40 MG: 40 TABLET, DELAYED RELEASE ORAL at 06:35

## 2021-02-24 NOTE — PROGRESS NOTES
I have reviewed discharge instructions with the patient. The patient verbalized understanding. Discharge medications reviewed with patient and appropriate educational materials and side effects teaching were provided.  IV removed AVS signed and copy given to patient

## 2021-02-24 NOTE — PROGRESS NOTES
Pharmacist Discharge Medication Reconciliation    Discharge Provider:  Dr. Tiago Hooks       Discharge Medications:      My Medications        START taking these medications        Instructions Each Dose to Equal Morning Noon Evening Bedtime   cefdinir 300 mg capsule  Commonly known as: OMNICEF    Your last dose was: Your next dose is: Take 1 Cap by mouth two (2) times a day for 5 days. 300 mg                        CHANGE how you take these medications        Instructions Each Dose to Equal Morning Noon Evening Bedtime   insulin glargine 100 unit/mL (3 mL) Inpn  Commonly known as: REMI GRAY  What changed: how much to take    Your last dose was: Your next dose is:         10 Units by SubCUTAneous route every evening. 2000   10 Units                        CONTINUE taking these medications        Instructions Each Dose to Equal Morning Noon Evening Bedtime   AdderalL 20 mg tablet  Generic drug: dextroamphetamine-amphetamine    Your last dose was: Your next dose is: Take 20 mg by mouth two (2) times daily as needed (Attention/Focus). 20 mg                 ALPRAZolam 0.25 mg tablet  Commonly known as: XANAX    Your last dose was: Your next dose is: Take 0.25 mg by mouth two (2) times daily as needed for Anxiety or Sleep. 0.25 mg                 fenofibric acid 135 mg capsule  Commonly known as: TRILIPIX ER    Your last dose was: Your next dose is: Take 135 mg by mouth daily. 135 mg                 omeprazole 20 mg capsule  Commonly known as: PRILOSEC    Your last dose was: Your next dose is: Take 20 mg by mouth daily. 20 mg                 Percocet 5-325 mg per tablet  Generic drug: oxyCODONE-acetaminophen    Your last dose was: Your next dose is: Take 1 Tab by mouth every six (6) hours as needed for Pain (Severe pain- Has Rx at home).    1 Tab                 propranolol LA 60 mg SR capsule  Commonly known as: INDERAL LA    Your last dose was: Your next dose is: Take 60 mg by mouth daily. 60 mg                 Wellbutrin  mg XL tablet  Generic drug: buPROPion XL    Your last dose was: Your next dose is: Take 300 mg by mouth every morning. 300 mg                 Zoloft 50 mg tablet  Generic drug: sertraline    Your last dose was: Your next dose is: Take 50 mg by mouth daily. 50 mg                        STOP taking these medications      dulaglutide 1.5 mg/0.5 mL sub-q pen  Commonly known as: TRULICITY        glimepiride 2 mg tablet  Commonly known as: AMARYL        Janumet XR 50-1,000 mg Tm24  Generic drug: SITagliptin-metFORMIN        ketorolac 10 mg tablet  Commonly known as: TORADOL        lisinopriL 10 mg tablet  Commonly known as: PRINIVIL, ZESTRIL        trimethoprim-sulfamethoxazole 160-800 mg per tablet  Commonly known as: BACTRIM DS, SEPTRA DS                  Where to Get Your Medications        Information on where to get these meds will be given to you by the nurse or doctor.     Ask your nurse or doctor about these medications  cefdinir 300 mg capsule  insulin glargine 100 unit/mL (3 mL) Inpn       DC ACE-I, NSAIDs and Bactrim upon discharge d/t MATT  Glimepiride, Janumet, and Trulicity dc'd and Lantus dose reduced for hypoglycemia d/t renal failure and multiple DM medications  Cefdinir upon discharge for UTI     The patient's chart, MAR, and AVS were reviewed by   KEON Thurman Celso Emilio [facility] 23: [phone]

## 2021-02-24 NOTE — PROGRESS NOTES
[address]  YOB: 1965          Assessment & Plan: MATT due to NSAIDs/Bactrim  CKD 3 presumably due to sarcoidosis  B nephrolithiasis s/p stent  Hepatic sarcoidosis by biopsy in the past  DM2, reasonably controlled  HTN    Rec:  Stay off lisinopril  Stay off bactrim and NSAIDs  OK for d/c from renal standpoint  Urological procedure Mon  Pt is planning to follow up with [facility] sarcoidosis clinic       Subjective:   CC: f/u MATT  HPI: Creat improved.  Hyperkalemia better  ROS: no sob/nv  Current Facility-Administered Medications   Medication Dose Route Frequency    0.9% sodium chloride infusion  75 mL/hr IntraVENous CONTINUOUS    labetaloL (NORMODYNE;TRANDATE) 20 mg/4 mL (5 mg/mL) injection 20 mg  20 mg IntraVENous Q4H PRN    hydrALAZINE (APRESOLINE) 20 mg/mL injection 20 mg  20 mg IntraVENous Q4H PRN    melatonin (rapid dissolve) tablet 5 mg  5 mg Oral QHS PRN    alum-mag hydroxide-simeth (MYLANTA) oral suspension 30 mL  30 mL Oral Q4H PRN    LORazepam (ATIVAN) injection 0.5 mg  0.5 mg IntraVENous Q6H PRN    oxyCODONE IR (ROXICODONE) tablet 5 mg  5 mg Oral Q4H PRN    oxyCODONE IR (ROXICODONE) tablet 10 mg  10 mg Oral Q4H PRN    morphine injection 2 mg  2 mg IntraVENous Q4H PRN    diphenhydrAMINE (BENADRYL) injection 25 mg  25 mg IntraVENous Q6H PRN    diphenhydrAMINE (BENADRYL) capsule 25 mg  25 mg Oral Q6H PRN    albuterol (PROVENTIL VENTOLIN) nebulizer solution 2.5 mg  2.5 mg Nebulization Q4H PRN    simethicone (MYLICON) tablet 80 mg  80 mg Oral QID PRN    sodium chloride (NS) flush 5-40 mL  5-40 mL IntraVENous Q8H    sodium chloride (NS) flush 5-40 mL  5-40 mL IntraVENous PRN    acetaminophen (TYLENOL) tablet 650 mg  650 mg Oral Q6H PRN    Or    acetaminophen (TYLENOL) suppository 650 mg  650 mg Rectal Q6H PRN    polyethylene glycol (MIRALAX) packet 17 g  17 g Oral DAILY PRN    bisacodyL (DULCOLAX) suppository 10 mg  10 mg Rectal DAILY PRN    ondansetron (ZOFRAN) injection 4 mg  4 mg IntraVENous Q6H PRN    pantoprazole (PROTONIX) tablet 40 mg  40 mg Oral ACB    cefTRIAXone (ROCEPHIN) 1 g in sterile water (preservative free) 10 mL IV syringe  1 g IntraVENous Q24H    insulin lispro (HUMALOG) injection   SubCUTAneous AC&HS    glucose chewable tablet 16 g  4 Tab Oral PRN    dextrose (D50W) injection syrg 12.5-25 g  12.5-25 g IntraVENous PRN    glucagon (GLUCAGEN) injection 1 mg  1 mg IntraMUSCular PRN    sodium bicarbonate tablet 650 mg  650 mg Oral BID    heparin (porcine) injection 5,000 Units  5,000 Units SubCUTAneous Q8H          Objective:     Vitals:  Blood pressure 117/81, pulse 88, temperature 98 °F (36.7 °C), resp. rate 16, height 6' 2\" (1.88 m), weight 88.5 kg (195 lb), SpO2 96 %. Temp (24hrs), Av.7 °F (36.5 °C), Min:96.2 °F (35.7 °C), Max:98.3 °F (36.8 °C)      Intake and Output:   0701 -  1900  In: 240 [P.O.:240]  Out: -    1901 -  0700  In: 9667 [P.O.:480; I.V.:1330]  Out: 5377 [Urine:1225]    Physical Exam:               GENERAL ASSESSMENT: NAD  CHEST: unlabored  HEART: S1S2  ABDOMEN: Soft,NT  EXTREMITY: no EDEMA  NEURO: Grossly non focal          ECG/rhythm:    Data Review      No results for input(s): TNIPOC in the last 72 hours.     No lab exists for component: ITNL   Recent Labs     21   TROIQ <0.05     Recent Labs     21  0519 21  1159 21  1001 21  0922 21  0330 21  0001 21    135*  --   --  136 135* 134*   K 4.6 4.8  --   --  6.0* 5.6* 6.6*    108  --   --  109* 107 108   CO2 20* 21  --   --  18* 20* 20*   BUN 22* 34*  --   --  41* 42* 44*   CREA 1.83* 2.32*  --   --  2.30* 2.63* 2.74*   * 160*  --   --  103* 181* 40*   PHOS 3.4  --   --   --  3.6 3.6  --    MG 1.8  --   --   --  1.6 1.9  --    CA 8.2* 8.5  --   --  8.8 11.6*  11.3* 8.9   ALB  --  3.6  --   --  3.4*  --  4.2   WBC 8.9  --  8.0 6.8  --   --  15.3*   HGB 12.2  --  12.3 9.8*  --   --  14.7   HCT 38.7  --  39.6 33.5*  --   --  48.5     --  341 283  --   --  521*      No results for input(s): INR, PTP, APTT, INREXT in the last 72 hours. Needs: urine analysis, urine sodium, protein and creatinine  No results found for: KAVEH NO        : Laure Ochoa MD  2/24/2021        [facility]:  [email or web address]. [employer]  Sriram Javier office:  [address] [address]  [address]  Phone: [phone]  Fax :     [phone]    [place] office:  [address] [facility]  Phone - [phone]  Fax - [phone]

## 2021-02-24 NOTE — PROGRESS NOTES
Verbal and Bedside shift change report given to [address] [facility] and [address] [place] [address] (oncoming nurse) by Josselyn Henry (offgoing nurse). Report included the following information SBAR, Kardex, Intake/Output, MAR and Recent Results.

## 2021-02-24 NOTE — DISCHARGE SUMMARY
[facility] 79  [address]  [phone]    Physician Discharge Summary     Patient ID:  Dominique Santos  [id number]  19 y.o.  1965    Admit date: 2/22/2021    Discharge date and time: 2/24/2021 11:28 AM    Admission Diagnoses: Acute hyperkalemia [E87.5]  MATT (acute kidney injury) ([address] [N17.9]  Hypoglycemia [E16.2]  Leukocytosis [D72.829]  Reactive thrombocytosis [R79.89]  DM (diabetes mellitus) type 2, uncontrolled, with ketoacidosis ([address] [E11.10]  HTN (hypertension) [I10]  Sarcoidosis [D86.9]  Recurrent nephrolithiasis [N20.0]    Discharge Diagnoses:  Principal Diagnosis Acute hyperkalemia                                            Principal Problem:    Acute hyperkalemia (2/23/2021)    Active Problems:    Sarcoidosis (2/23/2021)      Leukocytosis (2/23/2021)      HTN (hypertension) (2/23/2021)      Hypoglycemia (2/23/2021)      MATT (acute kidney injury) ([address] (2/23/2021)      Recurrent nephrolithiasis (2/23/2021)      Reactive thrombocytosis (2/23/2021)      UTI (urinary tract infection) (2/23/2021)      DM (diabetes mellitus), type 2 ([address] (2/23/2021)           Hospital Course:     47 yo hx of HTN, DM, sarcoidosis, bilateral nephrolithiasis s/p R ureteral stent, presented w/ MATT, hyperkalemia, hypoglycemia, UTI     1) MATT: much improved. Likely due to bactrim, NSAIDS. Will d/c bactrim and ACEi. Renal was following     2) Acute hyperkalemia: much improved. Due to MATT, bactrim. Patient received kayexalate      3) Complicated UTI due to ureteral stent: UCx pending. was on IV CTX. Will complete a course of omnicef      4) DM type 2 w/ hypoglycemia: A1C 6.8%. Hypoglycemia due to renal failure and multiple diabetic meds. Will d/c Amaryl, JanumetRufusulicity. Lantus was decreased to 10u daily. F/u with PCP     5) Bilateral nephrolithiasis: s/p R ureteral stent. Plan for outpatient procedure on 03/01.   Defer to Urology     6) Sarcoid: needs outpatient f/u    PCP: None     Consults: Nephrology    Significant Diagnostic Studies: none    Discharge Exam:  Physical Exam:    Gen: Well-developed, well-nourished, in no acute distress  HEENT:  Pink conjunctivae, PERRL, hearing intact to voice, moist mucous membranes  Neck: Supple, without masses, thyroid non-tender  Resp: No accessory muscle use, clear breath sounds without wheezes rales or rhonchi  Card: No murmurs, normal S1, S2 without thrills, bruits or peripheral edema  Abd:  Soft, non-tender, non-distended, normoactive bowel sounds are present  Lymph:  No cervical or inguinal adenopathy  Musc: No cyanosis or clubbing  Skin: No rashes   Neuro:  Cranial nerves are grossly intact, no focal motor weakness, follows commands appropriately  Psych:  Good insight, oriented to person, place and time, alert    Disposition: home  Discharge Condition: Stable    Patient Instructions:   Current Discharge Medication List      START taking these medications    Details   cefdinir (OMNICEF) 300 mg capsule Take 1 Cap by mouth two (2) times a day for 5 days. Qty: 10 Cap, Refills: 0         CONTINUE these medications which have CHANGED    Details   insulin glargine (LANTUS,BASAGLAR) 100 unit/mL (3 mL) inpn 10 Units by SubCUTAneous route every evening. 2000  Qty: 1 Pen, Refills: 0         CONTINUE these medications which have NOT CHANGED    Details   omeprazole (PRILOSEC) 20 mg capsule Take 20 mg by mouth daily. dextroamphetamine-amphetamine (AdderalL) 20 mg tablet Take 20 mg by mouth two (2) times daily as needed (Attention/Focus). oxyCODONE-acetaminophen (Percocet) 5-325 mg per tablet Take 1 Tab by mouth every six (6) hours as needed for Pain (Severe pain- Has Rx at home). propranolol LA (INDERAL LA) 60 mg SR capsule Take 60 mg by mouth daily. buPROPion XL (Wellbutrin XL) 300 mg XL tablet Take 300 mg by mouth every morning. sertraline (Zoloft) 50 mg tablet Take 50 mg by mouth daily.       fenofibric acid (TRILIPIX ER) 135 mg capsule Take 135 mg by mouth daily. ALPRAZolam (XANAX) 0.25 mg tablet Take 0.25 mg by mouth two (2) times daily as needed for Anxiety or Sleep.          STOP taking these medications       trimethoprim-sulfamethoxazole (BACTRIM DS, SEPTRA DS) 160-800 mg per tablet Comments:   Reason for Stopping:         ketorolac (TORADOL) 10 mg tablet Comments:   Reason for Stopping:         dulaglutide (TRULICITY) 1.5 PD/1.5 mL sub-q pen Comments:   Reason for Stopping:         SITagliptin-metFORMIN (Janumet XR) 50-1,000 mg TM24 Comments:   Reason for Stopping:         lisinopril (PRINIVIL, ZESTRIL) 10 mg tablet Comments:   Reason for Stopping:         glimepiride (AMARYL) 2 mg tablet Comments:   Reason for Stopping:             Activity: Activity as tolerated  Diet: Diabetic Diet  Wound Care: None needed    Follow-up with  Follow-up Information     Follow up With Specialties Details Why Contact Info    Ceci Stewart MD Internal Medicine Schedule an appointment as soon as possible for a visit in 5 days  [address] [place] [address] 84  [facility] 99 [address]      Maria Guadalupe Tim MD Nephrology Schedule an appointment as soon as possible for a visit in 2 weeks As needed, If symptoms worsen [facility] 30  [facility] [phone]  89 [facility] Urology  On 3/1/2021  [address]  [facility] [address]          Follow-up tests/labs: BMP     Signed:  Jonas Dickerson MD  2/24/2021  11:28 AM  **I personally spent 35 min on discharge**

## 2021-02-24 NOTE — DISCHARGE INSTRUCTIONS
HOSPITALIST DISCHARGE INSTRUCTIONS  NAME: Britton Calles   :  1965   MRN:  [id number]     Date/Time:  2021 11:26 AM    ADMIT DATE: 2021     DISCHARGE DATE: 2021     ADMITTING DIAGNOSIS:  Acute renal failure and hyperkalemia from Bactrim. Also low blood sugar due to multiple diabetic medications with renal failure     DISCHARGE DIAGNOSIS:  Same     MEDICATIONS:  See after visit summary       · It is important that you take the medication exactly as they are prescribed. · Keep your medication in the bottles provided by the pharmacist and keep a list of the medication names, dosages, and times to be taken in your wallet. · Do not take other medications without consulting your doctor     Pain Management: per above medications    What to do at Home    Recommended diet:  Diabetic Diet    Recommended activity: Activity as tolerated    1) Return to the hospital if you feel worse    2) If you experience any of the following symptoms then please call your primary care physician or return to the emergency room if you cannot get hold of your doctor:  Fever, chills, nausea, vomiting, diarrhea, change in mentation, falling, bleeding, shortness of breath, chest pain, severe headache, severe abdominal pain,     3) Stop taking Bactrim, lisinopril, and ketorolac     4) Stop diabetic medications. Decreased Lantus to 10u. Your Hgb A1C was 6.8%    5) Follow up with your doctors     Follow Up:   Follow-up Information     Follow up With Specialties Details Why Contact Info    Cynthia Reyes MD Internal Medicine Schedule an appointment as soon as possible for a visit in 5 days  [address] [place], [place] [address]  [phone]      Fernando Harden MD Nephrology Schedule an appointment as soon as possible for a visit in 2 weeks As needed, If symptoms worsen [address]  [facility] [phone]  [address] [facility] Urology  On 3/1/2021  [address] [place] [address] Dr Andie Roberts obtained by :  I understand that if any problems occur once I am at home I am to contact my physician. I understand and acknowledge receipt of the instructions indicated above. [de-identified] or R.N.'s Signature                                                                  Date/Time                                                                                                                                              Patient or Representative Signature                                                          Date/Time    Patient Education        Acute Kidney Injury: Care Instructions  Your Care Instructions     Acute kidney injury (MATT) is a sudden decrease in kidney function. This can happen over a period of hours, days or, in some cases, weeks. MATT used to be called acute renal failure, but kidney failure doesn't always happen with MATT. Common causes of MATT are dehydration, blood loss, and medicines. When MATT happens, the kidneys have trouble removing waste and excess fluids from the body. The waste and fluids build up and become harmful. Kidney function may return to normal if the cause of MATT is treated quickly. Your chance of a full recovery depends on what caused the problem, how quickly the cause was treated, and what other medical problems you have. A machine may be used to help your kidneys remove waste and fluids for a short period of time. This is called dialysis. Follow-up care is a key part of your treatment and safety. Be sure to make and go to all appointments, and call your doctor if you are having problems. It's also a good idea to know your test results and keep a list of the medicines you take. How can you care for yourself at home? · Talk to your doctor about how much fluid you should drink.   · Eat a balanced diet. Talk to your doctor or a dietitian about what type of diet may be best for you. You may need to limit sodium, potassium, and phosphorus. · If you need dialysis, follow the instructions and schedule for dialysis that your doctor gives you. · Do not smoke. Smoking can make your condition worse. If you need help quitting, talk to your doctor about stop-smoking programs and medicines. These can increase your chances of quitting for good. · Do not drink alcohol. · Review all of your medicines with your doctor. Do not take any medicines, including nonsteroidal anti-inflammatory drugs (NSAIDs) such as ibuprofen (Advil, Motrin) or naproxen (Aleve), unless your doctor says it is safe for you to do so. · Make sure that anyone treating you for any health problem knows that you have had MATT. When should you call for help? Call 911 anytime you think you may need emergency care. For example, call if:    · You passed out (lost consciousness). Call your doctor now or seek immediate medical care if:    · You have new or worse nausea and vomiting.     · You have much less urine than normal, or you have no urine.     · You are feeling confused or cannot think clearly.     · You have new or more blood in your urine.     · You have new swelling.     · You are dizzy or lightheaded, or feel like you may faint. Watch closely for changes in your health, and be sure to contact your doctor if:    · You do not get better as expected. Where can you learn more? Go to http://www.gray.com/  Enter F632 in the search box to learn more about \"Acute Kidney Injury: Care Instructions. \"  Current as of: April 15, 2020               Content Version: 12.6  © 9612-4678 [employer], Incorporated. Care instructions adapted under license by [employer] (which disclaims liability or warranty for this information).  If you have questions about a medical condition or this instruction, always ask your healthcare professional. [address] any warranty or liability for your use of this information. Patient Education        Hyperkalemia: Care Instructions  Your Care Instructions     Hyperkalemia is too much potassium in the blood. Potassium helps keep the right mix of fluids in your body. It also helps your nerves and muscles work as they should. And it keeps your heartbeat in a normal rhythm. Some things can raise potassium levels. These include some health problems, medicines, and kidney problems. (Normally, your kidneys remove extra potassium.)  Too much potassium can cause nausea. It also can cause a heartbeat that isn't normal. But you may not have any symptoms. Too much potassium can be dangerous. That's why it's important to treat it. If you are taking any of the medicines that can raise your levels, your doctor will ask you to stop. You may get medicines to lower your levels. And you may have to limit or not eat foods that have a lot of potassium. Follow-up care is a key part of your treatment and safety. Be sure to make and go to all appointments, and call your doctor if you are having problems. It's also a good idea to know your test results and keep a list of the medicines you take. How can you care for yourself at home? · Take your medicines exactly as prescribed. Call your doctor if you think you are having a problem with your medicine. · Stop taking certain medicines if your doctor asks you to. They may be causing your high potassium levels. If you have concerns about stopping medicine, talk with your doctor. · If you have kidney, heart, or liver disease and have to limit fluids, talk with your doctor before you increase the amount of fluids you drink. If the doctor says it's okay, drink plenty of fluids. This means drinking enough so that your urine is light yellow or clear like water.   · Avoid strenuous exercise until your doctor tells you it is okay.  · Potassium is in many foods, including vegetables, fruits, and milk products. Foods high in potassium include bananas, cantaloupe, broccoli, milk, potatoes, and tomatoes. · Low potassium foods include blueberries, raspberries, cucumber, white or brown rice, spaghetti, and macaroni. · Do not use a salt substitute without talking to your doctor first. Most of these are very high in potassium. · Be sure to tell your doctor about any prescription, over-the-counter, or herbal medicines you take. Some of these can raise potassium. When should you call for help? Call 911 anytime you think you may need emergency care. For example, call if:    · You passed out (lost consciousness).     · You have an unusual heartbeat. Your heart may beat fast or skip beats. Call your doctor now or seek immediate medical care if:    · You have muscle aches.     · You feel very weak. Watch closely for changes in your health, and be sure to contact your doctor if:    · You do not get better as expected. Where can you learn more? Go to http://www.gray.com/  Enter G087 in the search box to learn more about \"Hyperkalemia: Care Instructions. \"  Current as of: April 15, 2020               Content Version: 12.6  © 3678-7284 [employer]. Care instructions adapted under license by [employer] (which disclaims liability or warranty for this information). If you have questions about a medical condition or this instruction, always ask your healthcare professional. [address] [address] any warranty or liability for your use of this information.

## 2021-02-25 ENCOUNTER — HOSPITAL ENCOUNTER (OUTPATIENT)
Dept: PREADMISSION TESTING | Age: 56
Discharge: HOME | End: 2021-02-25
Payer: COMMERCIAL

## 2021-02-25 PROCEDURE — 77030040922 HC BLNKT HYPOTHRM STRY -A

## 2021-02-25 PROCEDURE — U0003 INFECTIOUS AGENT DETECTION BY NUCLEIC ACID (DNA OR RNA); SEVERE ACUTE RESPIRATORY SYNDROME CORONAVIRUS 2 (SARS-COV-2) (CORONAVIRUS DISEASE [COVID-19]), AMPLIFIED PROBE TECHNIQUE, MAKING USE OF HIGH THROUGHPUT TECHNOLOGIES AS DESCRIBED BY CMS-2020-01-R: HCPCS

## 2021-02-25 PROCEDURE — 77030040361 HC SLV COMPR DVT MDII -B

## 2021-02-26 ENCOUNTER — ANESTHESIA EVENT (OUTPATIENT)
Dept: SURGERY | Age: 56
End: 2021-02-26
Payer: COMMERCIAL

## 2021-02-26 LAB — SARS-COV-2, COV2NT: NOT DETECTED

## 2021-02-26 NOTE — PERIOP NOTES
Orders for surgery have been requested on 2/19/2021 x2. Called dr. Rosemary Mobley office again requesting that the orders for surgery now be faxed to the Main pre-op. The  will relay this to Dr. Rosemary Mobley /nurse.   DOS: 3/1/2021

## 2021-03-01 ENCOUNTER — TELEPHONE (OUTPATIENT)
Dept: INTERNAL MEDICINE CLINIC | Age: 56
End: 2021-03-01

## 2021-03-01 ENCOUNTER — APPOINTMENT (OUTPATIENT)
Dept: GENERAL RADIOLOGY | Age: 56
End: 2021-03-01
Attending: STUDENT IN AN ORGANIZED HEALTH CARE EDUCATION/TRAINING PROGRAM
Payer: COMMERCIAL

## 2021-03-01 ENCOUNTER — HOSPITAL ENCOUNTER (OUTPATIENT)
Age: 56
Setting detail: OUTPATIENT SURGERY
Discharge: HOME | End: 2021-03-01
Attending: STUDENT IN AN ORGANIZED HEALTH CARE EDUCATION/TRAINING PROGRAM | Admitting: STUDENT IN AN ORGANIZED HEALTH CARE EDUCATION/TRAINING PROGRAM
Payer: COMMERCIAL

## 2021-03-01 ENCOUNTER — ANESTHESIA (OUTPATIENT)
Dept: SURGERY | Age: 56
End: 2021-03-01
Payer: COMMERCIAL

## 2021-03-01 VITALS
RESPIRATION RATE: 14 BRPM | SYSTOLIC BLOOD PRESSURE: 162 MMHG | DIASTOLIC BLOOD PRESSURE: 96 MMHG | HEART RATE: 75 BPM | OXYGEN SATURATION: 97 % | WEIGHT: 194 LBS | BODY MASS INDEX: 24.91 KG/M2 | TEMPERATURE: 98.3 F

## 2021-03-01 DIAGNOSIS — N20.0 RECURRENT NEPHROLITHIASIS: Primary | ICD-10-CM

## 2021-03-01 LAB
GLUCOSE BLD STRIP.AUTO-MCNC: 102 MG/DL (ref 65–100)
GLUCOSE BLD STRIP.AUTO-MCNC: 181 MG/DL (ref 65–100)
SERVICE CMNT-IMP: ABNORMAL
SERVICE CMNT-IMP: ABNORMAL

## 2021-03-01 PROCEDURE — 88300 SURGICAL PATH GROSS: CPT

## 2021-03-01 PROCEDURE — 74011250636 HC RX REV CODE- 250/636: Performed by: ANESTHESIOLOGY

## 2021-03-01 PROCEDURE — 76010000161 HC OR TIME 1 TO 1.5 HR INTENSV-TIER 1: Performed by: STUDENT IN AN ORGANIZED HEALTH CARE EDUCATION/TRAINING PROGRAM

## 2021-03-01 PROCEDURE — C1769 GUIDE WIRE: HCPCS | Performed by: STUDENT IN AN ORGANIZED HEALTH CARE EDUCATION/TRAINING PROGRAM

## 2021-03-01 PROCEDURE — 2709999900 HC NON-CHARGEABLE SUPPLY: Performed by: STUDENT IN AN ORGANIZED HEALTH CARE EDUCATION/TRAINING PROGRAM

## 2021-03-01 PROCEDURE — 77030020143 HC AIRWY LARYN INTUB CGAS -A: Performed by: NURSE ANESTHETIST, CERTIFIED REGISTERED

## 2021-03-01 PROCEDURE — 82962 GLUCOSE BLOOD TEST: CPT

## 2021-03-01 PROCEDURE — 74011000250 HC RX REV CODE- 250: Performed by: STUDENT IN AN ORGANIZED HEALTH CARE EDUCATION/TRAINING PROGRAM

## 2021-03-01 PROCEDURE — 74011250636 HC RX REV CODE- 250/636: Performed by: NURSE ANESTHETIST, CERTIFIED REGISTERED

## 2021-03-01 PROCEDURE — C2617 STENT, NON-COR, TEM W/O DEL: HCPCS | Performed by: STUDENT IN AN ORGANIZED HEALTH CARE EDUCATION/TRAINING PROGRAM

## 2021-03-01 PROCEDURE — 76060000033 HC ANESTHESIA 1 TO 1.5 HR: Performed by: STUDENT IN AN ORGANIZED HEALTH CARE EDUCATION/TRAINING PROGRAM

## 2021-03-01 PROCEDURE — 76210000020 HC REC RM PH II FIRST 0.5 HR: Performed by: STUDENT IN AN ORGANIZED HEALTH CARE EDUCATION/TRAINING PROGRAM

## 2021-03-01 PROCEDURE — 74011000250 HC RX REV CODE- 250: Performed by: NURSE ANESTHETIST, CERTIFIED REGISTERED

## 2021-03-01 PROCEDURE — 76210000006 HC OR PH I REC 0.5 TO 1 HR: Performed by: STUDENT IN AN ORGANIZED HEALTH CARE EDUCATION/TRAINING PROGRAM

## 2021-03-01 PROCEDURE — 74011000636 HC RX REV CODE- 636: Performed by: STUDENT IN AN ORGANIZED HEALTH CARE EDUCATION/TRAINING PROGRAM

## 2021-03-01 PROCEDURE — 74011250636 HC RX REV CODE- 250/636: Performed by: STUDENT IN AN ORGANIZED HEALTH CARE EDUCATION/TRAINING PROGRAM

## 2021-03-01 PROCEDURE — 77030019927 HC TBNG IRR CYSTO BAXT -A: Performed by: STUDENT IN AN ORGANIZED HEALTH CARE EDUCATION/TRAINING PROGRAM

## 2021-03-01 PROCEDURE — C1894 INTRO/SHEATH, NON-LASER: HCPCS | Performed by: STUDENT IN AN ORGANIZED HEALTH CARE EDUCATION/TRAINING PROGRAM

## 2021-03-01 PROCEDURE — 77030006974 HC BSKT URET RTVR BSC -C: Performed by: STUDENT IN AN ORGANIZED HEALTH CARE EDUCATION/TRAINING PROGRAM

## 2021-03-01 PROCEDURE — 77030019948 HC FBR LSR HOLM DISP OCOA -E: Performed by: STUDENT IN AN ORGANIZED HEALTH CARE EDUCATION/TRAINING PROGRAM

## 2021-03-01 PROCEDURE — C1758 CATHETER, URETERAL: HCPCS | Performed by: STUDENT IN AN ORGANIZED HEALTH CARE EDUCATION/TRAINING PROGRAM

## 2021-03-01 DEVICE — URETERAL STENT
Type: IMPLANTABLE DEVICE | Site: URETER | Status: FUNCTIONAL
Brand: CONTOUR™

## 2021-03-01 RX ORDER — PROPOFOL 10 MG/ML
INJECTION, EMULSION INTRAVENOUS AS NEEDED
Status: DISCONTINUED | OUTPATIENT
Start: 2021-03-01 | End: 2021-03-01 | Stop reason: HOSPADM

## 2021-03-01 RX ORDER — SODIUM CHLORIDE, SODIUM LACTATE, POTASSIUM CHLORIDE, CALCIUM CHLORIDE 600; 310; 30; 20 MG/100ML; MG/100ML; MG/100ML; MG/100ML
150 INJECTION, SOLUTION INTRAVENOUS CONTINUOUS
Status: DISCONTINUED | OUTPATIENT
Start: 2021-03-01 | End: 2021-03-01 | Stop reason: HOSPADM

## 2021-03-01 RX ORDER — MIDAZOLAM HYDROCHLORIDE 1 MG/ML
INJECTION, SOLUTION INTRAMUSCULAR; INTRAVENOUS AS NEEDED
Status: DISCONTINUED | OUTPATIENT
Start: 2021-03-01 | End: 2021-03-01 | Stop reason: HOSPADM

## 2021-03-01 RX ORDER — LIDOCAINE HYDROCHLORIDE 10 MG/ML
0.1 INJECTION, SOLUTION EPIDURAL; INFILTRATION; INTRACAUDAL; PERINEURAL AS NEEDED
Status: DISCONTINUED | OUTPATIENT
Start: 2021-03-01 | End: 2021-03-01 | Stop reason: HOSPADM

## 2021-03-01 RX ORDER — FENTANYL CITRATE 50 UG/ML
INJECTION, SOLUTION INTRAMUSCULAR; INTRAVENOUS AS NEEDED
Status: DISCONTINUED | OUTPATIENT
Start: 2021-03-01 | End: 2021-03-01 | Stop reason: HOSPADM

## 2021-03-01 RX ORDER — DIPHENHYDRAMINE HYDROCHLORIDE 50 MG/ML
12.5 INJECTION, SOLUTION INTRAMUSCULAR; INTRAVENOUS AS NEEDED
Status: DISCONTINUED | OUTPATIENT
Start: 2021-03-01 | End: 2021-03-01 | Stop reason: HOSPADM

## 2021-03-01 RX ORDER — CEFTRIAXONE 1 G/1
1 INJECTION, POWDER, FOR SOLUTION INTRAMUSCULAR; INTRAVENOUS ONCE
Status: DISCONTINUED | OUTPATIENT
Start: 2021-03-01 | End: 2021-03-01

## 2021-03-01 RX ORDER — OXYCODONE AND ACETAMINOPHEN 5; 325 MG/1; MG/1
1 TABLET ORAL
Qty: 10 TAB | Refills: 0 | Status: SHIPPED | OUTPATIENT
Start: 2021-03-01 | End: 2021-03-06

## 2021-03-01 RX ORDER — TAMSULOSIN HYDROCHLORIDE 0.4 MG/1
0.4 CAPSULE ORAL DAILY
Qty: 14 CAP | Refills: 0 | Status: SHIPPED | OUTPATIENT
Start: 2021-03-01 | End: 2021-03-09

## 2021-03-01 RX ORDER — ONDANSETRON 2 MG/ML
4 INJECTION INTRAMUSCULAR; INTRAVENOUS AS NEEDED
Status: DISCONTINUED | OUTPATIENT
Start: 2021-03-01 | End: 2021-03-01 | Stop reason: HOSPADM

## 2021-03-01 RX ORDER — HYDROMORPHONE HYDROCHLORIDE 1 MG/ML
0.5 INJECTION, SOLUTION INTRAMUSCULAR; INTRAVENOUS; SUBCUTANEOUS
Status: DISCONTINUED | OUTPATIENT
Start: 2021-03-01 | End: 2021-03-01 | Stop reason: HOSPADM

## 2021-03-01 RX ORDER — ALBUTEROL SULFATE 0.83 MG/ML
2.5 SOLUTION RESPIRATORY (INHALATION) AS NEEDED
Status: DISCONTINUED | OUTPATIENT
Start: 2021-03-01 | End: 2021-03-01 | Stop reason: HOSPADM

## 2021-03-01 RX ORDER — PHENYLEPHRINE HCL IN 0.9% NACL 0.4MG/10ML
SYRINGE (ML) INTRAVENOUS AS NEEDED
Status: DISCONTINUED | OUTPATIENT
Start: 2021-03-01 | End: 2021-03-01 | Stop reason: HOSPADM

## 2021-03-01 RX ORDER — PROPOFOL 10 MG/ML
INJECTION, EMULSION INTRAVENOUS
Status: DISCONTINUED | OUTPATIENT
Start: 2021-03-01 | End: 2021-03-01 | Stop reason: HOSPADM

## 2021-03-01 RX ORDER — SODIUM CHLORIDE, SODIUM LACTATE, POTASSIUM CHLORIDE, CALCIUM CHLORIDE 600; 310; 30; 20 MG/100ML; MG/100ML; MG/100ML; MG/100ML
125 INJECTION, SOLUTION INTRAVENOUS CONTINUOUS
Status: DISCONTINUED | OUTPATIENT
Start: 2021-03-01 | End: 2021-03-01 | Stop reason: HOSPADM

## 2021-03-01 RX ORDER — LIDOCAINE HYDROCHLORIDE 20 MG/ML
INJECTION, SOLUTION EPIDURAL; INFILTRATION; INTRACAUDAL; PERINEURAL AS NEEDED
Status: DISCONTINUED | OUTPATIENT
Start: 2021-03-01 | End: 2021-03-01 | Stop reason: HOSPADM

## 2021-03-01 RX ORDER — ONDANSETRON 2 MG/ML
INJECTION INTRAMUSCULAR; INTRAVENOUS AS NEEDED
Status: DISCONTINUED | OUTPATIENT
Start: 2021-03-01 | End: 2021-03-01 | Stop reason: HOSPADM

## 2021-03-01 RX ADMIN — ONDANSETRON HYDROCHLORIDE 4 MG: 2 SOLUTION INTRAMUSCULAR; INTRAVENOUS at 15:28

## 2021-03-01 RX ADMIN — PROPOFOL 150 MG: 10 INJECTION, EMULSION INTRAVENOUS at 15:34

## 2021-03-01 RX ADMIN — CEFTRIAXONE 1 G: 1 INJECTION, POWDER, FOR SOLUTION INTRAMUSCULAR; INTRAVENOUS at 15:40

## 2021-03-01 RX ADMIN — MIDAZOLAM HYDROCHLORIDE 3 MG: 2 INJECTION, SOLUTION INTRAMUSCULAR; INTRAVENOUS at 15:27

## 2021-03-01 RX ADMIN — FENTANYL CITRATE 100 MCG: 0.05 INJECTION, SOLUTION INTRAMUSCULAR; INTRAVENOUS at 15:34

## 2021-03-01 RX ADMIN — Medication 80 MCG: at 16:01

## 2021-03-01 RX ADMIN — Medication 40 MCG: at 15:40

## 2021-03-01 RX ADMIN — PROPOFOL 100 MCG/KG/MIN: 10 INJECTION, EMULSION INTRAVENOUS at 15:34

## 2021-03-01 RX ADMIN — LIDOCAINE HYDROCHLORIDE 40 MG: 20 INJECTION, SOLUTION EPIDURAL; INFILTRATION; INTRACAUDAL; PERINEURAL at 15:34

## 2021-03-01 RX ADMIN — Medication 80 MCG: at 15:50

## 2021-03-01 RX ADMIN — Medication 80 MCG: at 16:16

## 2021-03-01 RX ADMIN — Medication 80 MCG: at 15:46

## 2021-03-01 RX ADMIN — FENTANYL CITRATE 50 MCG: 0.05 INJECTION, SOLUTION INTRAMUSCULAR; INTRAVENOUS at 16:13

## 2021-03-01 RX ADMIN — SODIUM CHLORIDE, POTASSIUM CHLORIDE, SODIUM LACTATE AND CALCIUM CHLORIDE: 600; 310; 30; 20 INJECTION, SOLUTION INTRAVENOUS at 15:21

## 2021-03-01 RX ADMIN — Medication 120 MCG: at 16:34

## 2021-03-01 RX ADMIN — Medication 80 MCG: at 15:55

## 2021-03-01 RX ADMIN — Medication 80 MCG: at 16:28

## 2021-03-01 NOTE — ANESTHESIA PREPROCEDURE EVALUATION
Anesthetic History   No history of anesthetic complications            Review of Systems / Medical History  Patient summary reviewed, nursing notes reviewed and pertinent labs reviewed    Pulmonary  Within defined limits                 Neuro/Psych   Within defined limits           Cardiovascular    Hypertension              Exercise tolerance: >4 METS     GI/Hepatic/Renal     GERD      Liver disease     Endo/Other    Diabetes: poorly controlled, type 2         Other Findings              Physical Exam    Airway  Mallampati: II  TM Distance: > 6 cm  Neck ROM: normal range of motion   Mouth opening: Normal     Cardiovascular  Regular rate and rhythm,  S1 and S2 normal,  no murmur, click, rub, or gallop             Dental  No notable dental hx       Pulmonary  Breath sounds clear to auscultation               Abdominal  GI exam deferred       Other Findings            Anesthetic Plan    ASA: 2  Anesthesia type: general          Induction: Intravenous  Anesthetic plan and risks discussed with: Patient

## 2021-03-01 NOTE — TELEPHONE ENCOUNTER
----- Message from Sandy Francisco sent at 2021 12:32 PM EST -----  Regarding: Dr. Lanza [phone]  General Message/Vendor Calls    Caller's first and last name: Eve Trinidad - Wife      Reason for call: KENJI ALEJO NP Appt request      Callback required yes/no and why:      Best contact number(s): [phone]      Details to clarify the request:  Unable to find appt within required timeframe. SHARON  PT discharged from [facility]  Diagnosis: acute renal failure, high pottassium levels, due to kidney stone blockage.   Scheduled for kidney stone surgery 3/1/21      Sandy Francisco

## 2021-03-01 NOTE — DISCHARGE INSTRUCTIONS
Patient Education      Patient Education        Ureteral Stent Placement: What to Expect at Home  Your Recovery     A ureteral (say \"you-REE-ter-ul\") stent is a thin, hollow tube that is placed in the ureter to help urine pass from the kidney into the bladder. Ureters are the tubes that connect the kidneys to the bladder. You may have a small amount of blood in your urine for 1 to 3 days after the procedure. While the stent is in place, you may have to urinate more often, feel a sudden need to urinate, or feel like you can't completely empty your bladder. You may feel some pain when you urinate or do strenuous activity. You also may notice a small amount of blood in your urine after strenuous activities. These side effects usually don't prevent people from doing their normal daily activities. You may have a thin string coming out of your urethra. Your urethra is the tube that carries urine from your bladder to outside your body. This string is attached to the stent. Try not to pull on the string. The doctor will use the string to pull out the stent when you no longer need it. After the procedure, urine may flow better from your kidneys to your bladder. A ureteral stent may be left in place for several days or for as long as several months. Your doctor will take it out when you no longer need it. This care sheet gives you a general idea about how long it will take for you to recover. But each person recovers at a different pace. Follow the steps below to get better as quickly as possible. How can you care for yourself at home? Activity    · Rest when you feel tired. Getting enough sleep will help you recover.     · Avoid strenuous activities, such as bicycle riding, jogging, weight lifting, or aerobic exercise, until your doctor says it is okay.     · Ask your doctor when you can drive again.     · Most people are able to return to work the day after the procedure.  If your work requires intense activity, you may feel pain in your kidney area or get tired easily. If this happens, you may need to do less strenuous activities while the stent is in.     · Ask your doctor when it is okay for you to have sex. Diet    · You can eat your normal diet. If your stomach is upset, try bland, low-fat foods like plain rice, broiled chicken, toast, and yogurt.     · Drink plenty of fluids (unless your doctor tells you not to). Medicines    · Your doctor will tell you if and when you can restart your medicines. You will also get instructions about taking any new medicines.     · If you take aspirin or some other blood thinner, ask your doctor if and when to start taking it again. Make sure that you understand exactly what your doctor wants you to do.     · Be safe with medicines. Take pain medicines exactly as directed. ? If the doctor gave you a prescription medicine for pain, take it as prescribed. ? If you are not taking a prescription pain medicine, ask your doctor if you can take an over-the-counter medicine.     · If you think your pain medicine is making you sick to your stomach:  ? Take your medicine after meals (unless your doctor has told you not to). ? Ask your doctor for a different pain medicine.     · If your doctor prescribed antibiotics, take them as directed. Do not stop taking them just because you feel better. You need to take the full course of antibiotics. Follow-up care is a key part of your treatment and safety. Be sure to make and go to all appointments, and call your doctor if you are having problems. It's also a good idea to know your test results and keep a list of the medicines you take. When should you call for help? Call 911 anytime you think you may need emergency care. For example, call if:    · You passed out (lost consciousness).     · You have severe trouble breathing.     · You have sudden chest pain and shortness of breath, or you cough up blood.     · You have severe belly pain.    Call your doctor now or seek immediate medical care if:    · Part or all of the stent comes out of your urethra.     · You have pain that does not get better after you take pain medicine.     · You have symptoms of a urinary infection. For example:  ? You have blood or pus in your urine. ? You have pain in your back just below your rib cage. This is called flank pain. ? You have a fever, chills, or body aches. ? It hurts to urinate. ? You have groin or belly pain.     · You cannot control when you urinate, or you leak urine. Watch closely for changes in your health, and be sure to contact your doctor if you have any problems. Where can you learn more? Go to http://www.gray.com/  Enter B869 in the search box to learn more about \"Ureteral Stent Placement: What to Expect at Home. \"  Current as of: June 29, 2020               Content Version: 12.6  © 4879-5390 [employer]. Care instructions adapted under license by [employer] (which disclaims liability or warranty for this information). If you have questions about a medical condition or this instruction, always ask your healthcare professional. [place] [address] any warranty or liability for your use of this information. Laser Lithotripsy: What to Expect at [address] lithotripsy is a way to treat kidney stones. This treatment uses a laser to break kidney stones into tiny pieces. For several hours after the procedure you may have a burning feeling when you urinate. You may feel the urge to go even if you don't need to. This feeling should go away within a day. Drinking a lot of water can help. Your doctor also may advise you to take medicine that numbs the burning. This medicine is called phenazopyridine. It is available by prescription and over the counter. Brand names include Pyridium and Uristat. You may have some blood in your urine for 2 or 3 days.   Your doctor may have placed a small tube inside one of your ureters. Ureters are the tubes that connect the kidneys to the bladder. The small tube the doctor may have placed is called a stent. It may help the stone fragments pass through your body. Your doctor may remove the stent in a few weeks. Most stone fragments that are not removed will pass out of the body within 24 hours. But sometimes it can take many weeks. If you have a large stone, you may need to come back for more treatments. This care sheet gives you a general idea about how long it will take for you to recover. But each person recovers at a different pace. Follow the steps below to feel better as quickly as possible. How can you care for yourself at home? Activity    · Rest as much as you need to after you go home.     · You may do your regular activities. But avoid hard exercise or sports for about a week or until there is no blood in your urine. Diet    · You can eat your normal diet after lithotripsy.     · Continue to drink plenty of fluids, enough so that your urine is light yellow or clear like water. If you have kidney, heart, or liver disease and have to limit fluids, talk with your doctor before you increase the amount of fluids you drink. Medicines    · Your doctor will tell you if and when you can restart your medicines. He or she will also give you instructions about taking any new medicines.     · If you take aspirin or some other blood thinner, ask your doctor if and when to start taking it again. Make sure that you understand exactly what your doctor wants you to do.     · If you take medicine to stop the burning when you urinate, take it exactly as recommended. Call your doctor if you think you are having a problem with your medicine. This medicine may color your urine orange or red. This is normal. You will get more details on the specific medicine your doctor recommends.     · If your doctor prescribed antibiotics, take them as directed.  Do not stop taking them just because you feel better. You need to take the full course of antibiotics.     · Be safe with medicines. Read and follow all instructions on the label. ? If the doctor gave you a prescription medicine for pain, take it as prescribed. ? If you are not taking a prescription pain medicine, ask your doctor if you can take acetaminophen (Tylenol). Do not take ibuprofen (Advil, Motrin) or naproxen (Aleve) or similar medicines unless your doctor tells you to. ? Do not take two or more pain medicines at the same time unless the doctor told you to. Many pain medicines have acetaminophen, which is Tylenol. Too much acetaminophen (Tylenol) can be harmful. Heat    · Take a warm bath. This may soothe the burning. Other instructions    · Urinate through the strainer the doctor gives you. Save any stone pieces, including those that look like sand or gravel. Take these to your doctor. This will help your doctor find the cause of your stones. Follow-up care is a key part of your treatment and safety. Be sure to make and go to all appointments, and call your doctor if you are having problems. It's also a good idea to know your test results and keep a list of the medicines you take. When should you call for help? Call 911 anytime you think you may need emergency care. For example, call if:    · You passed out (lost consciousness).     · You have chest pain, are short of breath, or cough up blood. Call your doctor now or seek immediate medical care if:    · You have pain that does not get better after you take pain medicine.     · You have new or more blood clots in your urine. (It is normal for the urine to be pink for a few days.)     · You cannot urinate.     · You have symptoms of a urinary tract infection. These may include:  ? Pain or burning when you urinate. ? A frequent need to urinate without being able to pass much urine.   ? Pain in the flank, which is just below the rib cage and above the waist on either side of the back. ? Blood in the urine. ? A fever.     · You are sick to your stomach or cannot drink fluids.     · You have signs of a blood clot in your leg (called a deep vein thrombosis), such as:  ? Pain in the calf, back of the knee, thigh, or groin. ? Redness and swelling in your leg. Watch closely for any changes in your health, and be sure to contact your doctor if you have any problems. Where can you learn more? Go to http://www.gray.com/  Enter Q239 in the search box to learn more about \"Laser Lithotripsy: What to Expect at Home. \"  Current as of: April 15, 2020               Content Version: 12.6  © 2006-2020 [employer]. Care instructions adapted under license by [employer] (which disclaims liability or warranty for this information). If you have questions about a medical condition or this instruction, always ask your healthcare professional. [place] [address] any warranty or liability for your use of this information. DISCHARGE SUMMARY from your Nurse      PATIENT INSTRUCTIONS    After general anesthesia or intravenous sedation, for 24 hours or while taking prescription Narcotics:  · Limit your activities  · Do not drive and operate hazardous machinery  · Do not make important personal or business decisions  · Do  not drink alcoholic beverages  · If you have not urinated within 8 hours after discharge, please contact your surgeon on call.     Report the following to your surgeon:  · Excessive pain, swelling, redness or odor of or around the surgical area  · Temperature over 100.5  · Nausea and vomiting lasting longer than 4 hours or if unable to take medications  · Any signs of decreased circulation or nerve impairment to extremity: change in color, persistent  numbness, tingling, coldness or increase pain  · Any questions      GOOD HELP TO FIGHT AN INFECTION  Here are a few tip to help reduce the chance of getting an infection after surgery:   Wash Your Hands   Good handwashing is the most important thing you and your caregiver can do.  Wash before and after caring for any wounds. Dry your hand with a clean towel.  Wash with soap and water for at least 20 seconds. A TIP: sing the \"Happy Birthday\" song through one time while washing to help with the timing.  Use a hand  in between washings.  Shower   When your surgeon says it is OK to take a shower, use a new bar of antibacterial soap (if that is what you use, and keep that bar of soap ONLY for your use), or antibacterial body wash.  Use a clean wash cloth or sponge when you bathe.  Dry off with a clean towel  after every bath - be careful around any wounds, skin staples, sutures or surgical glue over/on wounds.  Do not enter swimming pools, hot tubs, lakes, rivers and/or ocean until wounds are healed and your doctor/surgeon says it is OK.  Use Clean Sheets   Sleep on freshly laundered sheets after your surgery.  Keep the surgery site covered with a clean, dry bandage (if instructed to do so). If the bandage becomes soiled, reapply a new, dry, clean bandage.  Do not allow pets to sleep with you while your wound is healing.  Lifestyle Modification and Controlling Your Blood Sugar   Smoking slows wound healing. Stop smoking and limit exposure to second-hand smoke.  High blood sugar slows wound healing. Eat a well-balanced diet to provide proper nutrition while healing   Monitor your blood sugar (if you are a diabetic) and take your medications as you are suppose to so you can control you blood sugar after surgery. COUGH AND DEEP BREATHE    Breathing deeply and coughing are very important exercises to do after surgery. Deep breathing and coughing open the little air tubes and air sacks in your lungs. You take deep breaths every day. You may not even notice - it is just something you do when you sigh or yawn. It is a natural exercise you do to keep these air passages open. After surgery, take deep breaths and cough, on purpose. DIRECTIONS:  · Take 10 to 15 slow deep breaths every hour while awake. · Breathe in deeply, and hold it for 2 seconds. · Exhale slowly through puckered lips, like blowing up a balloon. · After every 4th or 5th deep breath, hug your pillow to your chest or belly and give a hard, deep cough. Yes, it will probably hurt. But doing this exercise is a very important part of healing after surgery. Take your pain medicine to help you do this exercise without too much pain. Coughing and deep breathing help prevent bronchitis and pneumonia after surgery. If you had chest or belly surgery, use a pillow as a \"hug kayce\" and hold it tightly to your chest or belly when you cough. ANKLE PUMPS    Ankle pumps increase the circulation of oxygenated blood to your lower extremities and decrease your risk for circulation problems such as blood clots. They also stretch the muscles, tendons and ligaments in your foot and ankle, and prevent joint contracture in the ankle and foot, especially after surgeries on the legs. It is important to do ankle pump exercises regularly after surgery because immobility increases your risk for developing a blood clot. Your doctor may also have you take an Aspirin for the next few days as well. If your doctor did not ask you to take an Aspirin, consult with him before starting Aspirin therapy on your own. The exercise is quite simple. · Slowly point your foot forward, feeling the muscles on the top of your lower leg stretch, and hold this position for 5 seconds. · Next, pull your foot back toward you as far as possible, stretching the calf muscles, and hold that position for 5 seconds. · Repeat with the other foot.   · Perform 10 repetitions every hour while awake for both ankles if possible (down and then up with the foot once is one repetition). You should feel gentle stretching of the muscles in your lower leg when doing this exercise. If you feel pain, or your range of motion is limited, don't push too hard. Only go the limit your joint and muscles will let you go. If you have increasing pain, progressively worsening leg warmth or swelling, STOP the exercise and call your doctor. MEDICATION AND   SIDE EFFECT GUIDE    The [facility] Insurance MEDICATION AND SIDE EFFECT GUIDE was provided to the PATIENT AND CARE PROVIDER. Information provided includes instruction about drug purpose and common side effects for the following medications:   · ***        These are general instructions for a healthy lifestyle:    *   Please give a list of your current medications to your Primary Care Provider. *   Please update this list whenever your medications are discontinued, doses are changed, or new medications (including over-the-counter products) are added. *   Please carry medication information at all times in case of emergency situations. About Smoking  No smoking / No tobacco products  Avoid exposure to second hand smoke     Surgeon General's Warning:  Quitting smoking now greatly reduces serious risk to your health. Obesity, smoking, and sedentary lifestyle greatly increases your risk for illness and disease. A healthy diet, regular physical exercise & weight monitoring are important for maintaining a healthy lifestyle. Congestive Heart Failure  You may be retaining fluid if you have a history of heart failure or if you experience any of the following symptoms:  Weight gain of 3 pounds or more overnight or 5 pounds in a week, increased swelling in your hands or feet or shortness of breath while lying flat in bed. Please call your doctor as soon as you notice any of these symptoms; do not wait until your next office visit.       Recognize signs and symptoms of STROKE:  F -  Face looks uneven  A -  Arms unable to move or move evenly  S -  Speech slurred or non-existent  T -  Time-call 911 as soon as signs and symptoms begin-DO NOT go          back to bed or wait to see if you get better-TIME IS BRAIN. Warning Signs of HEART ATTACK   Call 911 if you have these symptoms:     Chest discomfort. Most heart attacks involve discomfort in the center of the chest that lasts more than a few minutes, or that goes away and comes back. It can feel like uncomfortable pressure, squeezing, fullness, or pain.  Discomfort in other areas of the upper body. Symptoms can include pain or discomfort in one or both arms, the back, neck, jaw, or stomach.  Shortness of breath with or without chest discomfort.  Other signs may include breaking out in a cold sweat, nausea, or lightheadedness. Don't wait more than five minutes to call 911 - MINUTES MATTER! Fast action can save your life. Calling 911 is almost always the fastest way to get lifesaving treatment. Emergency Medical Services staff can begin treatment when they arrive -- up to an hour sooner than if someone gets to the hospital by car. Learning About Coronavirus [phone])  Coronavirus [phone]): Overview  What is coronavirus (COVID-19)? The coronavirus disease (COVID-19) is caused by a virus. It is an illness that was first found in [place], [place], in December 2019. It has since spread worldwide. The virus can cause fever, cough, and trouble breathing. In severe cases, it can cause pneumonia and make it hard to breathe without help. It can cause death. Coronaviruses are a large group of viruses. They cause the common cold. They also cause more serious illnesses like Middle East respiratory syndrome (MERS) and severe acute respiratory syndrome (SARS). COVID-19 is caused by a novel coronavirus. That means it's a new type that has not been seen in people before. This virus spreads person-to-person through droplets from coughing and sneezing.  It can also spread when you are close to someone who is infected. And it can spread when you touch something that has the virus on it, such as a doorknob or a tabletop. What can you do to protect yourself from coronavirus (COVID-19)? The best way to protect yourself from getting sick is to:  · Avoid areas where there is an outbreak. · Avoid contact with people who may be infected. · Wash your hands often with soap or alcohol-based hand sanitizers. · Avoid crowds and try to stay at least 6 feet away from other people. · Wash your hands often, especially after you cough or sneeze. Use soap and water, and scrub for at least 20 seconds. If soap and water aren't available, use an alcohol-based hand . · Avoid touching your mouth, nose, and eyes. What can you do to avoid spreading the virus to others? To help avoid spreading the virus to others:  · Cover your mouth with a tissue when you cough or sneeze. Then throw the tissue in the trash. · Use a disinfectant to clean things that you touch often. · Stay home if you are sick or have been exposed to the virus. Don't go to school, work, or public areas. And don't use public transportation. · If you are sick:  ? Leave your home only if you need to get medical care. But call the doctor's office first so they know you're coming. And wear a face mask, if you have one.  ? If you have a face mask, wear it whenever you're around other people. It can help stop the spread of the virus when you cough or sneeze. ? Clean and disinfect your home every day. Use household  and disinfectant wipes or sprays. Take special care to clean things that you grab with your hands. These include doorknobs, remote controls, phones, and handles on your refrigerator and microwave. And don't forget countertops, tabletops, bathrooms, and computer keyboards. When to call for help  Call 911 anytime you think you may need emergency care. For example, call if:  · You have severe trouble breathing. (You can't talk at all.)  · You have constant chest pain or pressure. · You are severely dizzy or lightheaded. · You are confused or can't think clearly. · Your face and lips have a blue color. · You pass out (lose consciousness) or are very hard to wake up. Call your doctor now if you develop symptoms such as:  · Shortness of breath. · Fever. · Cough. If you need to get care, call ahead to the doctor's office for instructions before you go. Make sure you wear a face mask, if you have one, to prevent exposing other people to the virus. Where can you get the latest information? The following health organizations are tracking and studying this virus. Their websites contain the most up-to-date information. [place]oe also learn what to do if you think you may have been exposed to the virus. · U.S. Centers for Disease Control and Prevention (CDC): The CDC provides updated news about the disease and travel advice. The website also tells you how to prevent the spread of infection. www.cdc.gov  · World Health Organization [facility]): WHO offers information about the virus outbreaks. WHO also has travel advice. www.who.int  Current as of: April 1, 2020               Content Version: 12.4  © 2006-2020 [employer]. Care instructions adapted under license by your healthcare professional. If you have questions about a medical condition or this instruction, always ask your healthcare professional. [place] [address] any warranty or liability for your use of this information. The discharge information has been reviewed with the {PATIENT PARENT GUARDIAN:31699}. Any questions and concerns from the {PATIENT PARENT GUARDIAN:05133} have been addressed. The {PATIENT PARENT GUARDIAN:35887} verbalized understanding.         The following personal items collected during your admission are returned to you:   Dental Appliance: Dental Appliances: None  Vision:    Hearing Aid:    Jewelry: Jewelry: None  Clothing: Clothing: Pants, Shirt  Other Valuables:  Other Valuables: None  Valuables sent to safe:

## 2021-03-01 NOTE — OP NOTES
DATE OF PROCEDURE: 3/1/2021    Surgeon: Yovanny Israel MD    ASSISTANT: none    PREOPERATIVE DIAGNOSES:    1. Right ureteral and renal calculi    POSTOPERATIVE DIAGNOSES:    Same    PROCEDURES PERFORMED:   1. Cystourethroscopy  2. Right ureteroscopic stone manipulation with laser lithotripsy and basket extraction  3. Right retrograde pyelogram  4. Right ureteral stent exchange-6 x 28 double-J with long string  5. Intraoperative fluoroscopy interpretation less than 1 hour    PERIOPERATIVE ANTIBIOTICS: Rocephin      ANESTHESIA: General      INDICATIONS:This patient has a history of 5 and 6 mm right ureteral calculi and refractory pain. He underwent right ureteral stent placement on 2/8/2021. He was also noted to have a cluster of stones in the right lower pole approximately 9 mm. After discussion of management options the patient elected to proceed with the procedures listed above. PROCEDURE NARRATIVE: The patient signed consent for the operation prior to being brought back to the operating room. Upon arrival on the operating room, a time out was performed for the patient's safety and the correct patient, procedure, site and side were identified. the patient was placed in a supine position and anesthesia was administered. The patient was placed in a dorsal lithotomy position. All pressure points were appropriately padded in order to prevent compartment syndrome and neuropathy. The patient was prepped and draped in the usual sterile fashion. Cystourethroscopy was performed the 22 Slovak sheath and 30 degree lens. The penile urethra appeared normal.  There was mild tri lobar enlargement of the prostatic urethra with a small median lobe. The bladder mucosa appeared normal.  Attention was directed to the right ureteral orifice. The stent was significantly encrusted, which was surprising given its short dwell time.     A sensor wire was advanced alongside the stent and confirmed in the right renal pelvis fluoroscopically. This wire was secured to the drape. Graspers were then used to remove the existing right ureteral stent intact. Semirigid ureteroscopy was then performed. 2 stones were encountered in the distal ureter, approximately 6 mm each. The 200 µm laser fiber was used to fragment the stones into several pieces which were then retrieved with a basket and sent for analysis. The scope was further advanced more proximally into the ureter. I was not able to pass beyond the level of the iliac vessels due to angulation of the ureter. A limited retrograde pyelogram was performed that showed mild hydroureteronephrosis consistent with underlying stent but no filling defects. A sensor wire was then passed through the scope into the renal pelvis leaving 2 wires in place. The ureteroscope was backed off. Under fluoroscopic guidance, an 11/13, 46 cm ureteral access sheath was advanced easily into the proximal ureter. A flexible ureteroscope was then used to survey the pelvicalyceal system. There was mild debris in the pelvis consistent with prior encrusted stent debris. Calyces were surveyed and a cluster of stones was identified in the lower pole. There were 2 stones here greater than 5 mm in size and innumerable small round stones all less than 1 mm. The larger stones were treated with the laser fiber on dusting and popcorn settings alternating. The calyx was somewhat patulous and difficult to maintain adequate vision to safely treat the stones. I did use a basket to retrieve the largest fragment from the lower pole calyx. This was sent for analysis. A 10 cc syringe was used to pulse irrigate the lower pole calyx and attempt to flush out the innumerable small round fragments with moderate success. Retrograde pyelogram was then performed through the scope which showed normal filling of the pelvicalyceal system. This was then used as a roadmap to survey all calyces.   Punctate stone debris was identified in upper, interpolar, and lower pole calyces. No sizable stone fragments were seen to remain but he did have a significant burden of debris. The scope and access sheath were backed out under vision. No ureteral calculi were seen and there was no evidence of injury to the ureter. The cystoscope was then front loaded onto the existing safety wire. A 6 x 28 double-J ureteral stent was advanced under direct vision fluoroscopic guidance. Good proximal distal curl was noted. The string was left long and trimmed at the meatus. This terminated the procedure. INTRAOPERATIVE FINDINGS/ SYNOPSIS:     1.  2 ureteral calculi, each about 6 mm, were treated with the laser and basket in the distal ureter  2. A cluster of stones was identified in the lower pole calyx. This consisted of 2 stones approximately 5 mm each in size that were treated with the laser. There were innumerable punctate round stones clustered here as well that were treated with the laser. 3.  No sizable stone fragments remained, however he did have a significant amount of stone debris and all calyces. BLOOD LOSS: Minimal        DRAINS/IMPLANTS: 6 x 28 double-J stent on right        SPECIMENS:      Right ureteral calculi    DISPOSITION: The patient will be taken to the PACU for recovery. He will be discharged home.   He will follow-up next week as scheduled for KUB and possible stent removal

## 2021-03-01 NOTE — H&P
Surgery History and Physcial    Subjective:      Flaquita Myers is a 48 y.o. male with a history of R ureteral stones 5&6mm s/p stent placement by Dr. Kendal Tripp 2/8/21. He presents today for right ureteroscopic stone manipulation with stent exchange. He was recently admitted for hyperkalemia. CT at that time redemonstrated stone alongside the R stent, R lower pole calculus    Patient Active Problem List    Diagnosis Date Noted    Sarcoidosis 02/23/2021    Acute hyperkalemia 02/23/2021    Leukocytosis 02/23/2021    HTN (hypertension) 02/23/2021    Hypoglycemia 02/23/2021    DM (diabetes mellitus) type 2, uncontrolled, with ketoacidosis ([address] 02/23/2021    MATT (acute kidney injury) ([address] 02/23/2021    Recurrent nephrolithiasis 02/23/2021    Reactive thrombocytosis 02/23/2021    UTI (urinary tract infection) 02/23/2021    DM (diabetes mellitus), type 2 ([address] 02/23/2021    Cervical stenosis of spinal canal 05/13/2016     Past Medical History:   Diagnosis Date    Autoimmune disease ([address]     Sarcoidosis    Cervical stenosis of spinal canal 5/13/2016    Diabetes ([address]     GERD (gastroesophageal reflux disease)     Hypertension     Ill-defined condition     Ill-defined condition     Tremors (mostly hands)    Liver disease     Sarcoid    Psychiatric disorder     Anxiety/Depression      Past Surgical History:   Procedure Laterality Date    HX CERVICAL FUSION      HX ORTHOPAEDIC      multi lumbar lami/fusions    HX TONSILLECTOMY      HX UROLOGICAL      Kidney Stones \"vacuumed\"    [place] ABDOMEN SURG[place] PROC UNLISTED  2013    liver biopsy      Social History     Tobacco Use    Smoking status: Current Some Day Smoker    Smokeless tobacco: Never Used    Tobacco comment: occaisnal cigar   Substance Use Topics    Alcohol use: Yes     Alcohol/week: 1.0 standard drinks     Types: 1 Glasses of wine per week     Comment: socially      History reviewed. No pertinent family history.    Prior to Admission medications    Medication Sig Start Date End Date Taking? Authorizing Provider   insulin glargine (LANTUS,BASAGLAR) 100 unit/mL (3 mL) inpn 10 Units by SubCUTAneous route every evening. 2000 2/24/21  Yes , James GURROLA MD   omeprazole (PRILOSEC) 20 mg capsule Take 20 mg by mouth daily. Yes Provider, Historical   dextroamphetamine-amphetamine (AdderalL) 20 mg tablet Take 20 mg by mouth two (2) times daily as needed (Attention/Focus). Yes Provider, Historical   oxyCODONE-acetaminophen (Percocet) 5-325 mg per tablet Take 1 Tab by mouth every six (6) hours as needed for Pain (Severe pain- Has Rx at home). Yes Provider, Historical   propranolol LA (INDERAL LA) 60 mg SR capsule Take 60 mg by mouth daily. Yes Provider, Historical   buPROPion XL (Wellbutrin XL) 300 mg XL tablet Take 300 mg by mouth every morning. Yes Other, MD Maria Teresa   sertraline (Zoloft) 50 mg tablet Take 50 mg by mouth daily. Yes Other, MD Maria Teresa   fenofibric acid (TRILIPIX ER) 135 mg capsule Take 135 mg by mouth daily. Yes Provider, Historical   ALPRAZolam (XANAX) 0.25 mg tablet Take 0.25 mg by mouth two (2) times daily as needed for Anxiety or Sleep. Yes Provider, Historical   cefdinir (OMNICEF) 300 mg capsule Take 1 Cap by mouth two (2) times a day for 5 days.  2/24/21 3/1/21  Yamile Rascno MD     No Known Allergies      Review of Systems     Objective:     Visit Vitals  BP (!) 175/101 (BP 1 Location: Right arm) Comment: Pt verbalizes being anxious regarding IV placement   Pulse (!) 108   Temp 98.1 °F (36.7 °C)   Resp 18   Wt 88 kg (194 lb 0.1 oz)   SpO2 99%   BMI 24.91 kg/m²       Physical Exam  Appearance: well-developed NAD   Conjunctiva/Lids: conjunctivae and lids normal  Pupil/Iris: pupils equal, round   External Ears/Nose: normal no lesions or deformities  Hearing: grossly intact   Neck: supple Thyroid: no enlargement    Lymph: no adenopathy   Respiratory Effort: breathing easily   Gait/Station: normal        Lower Extremity: no edema Digits/Nails: no clubbing cyanosis petechiae   Skin Inspection: warm and dry Skin     Palpation: no SQ nodularity  Sensation: no sensory deficits  Judgment/Insight: intact  Mood/Affect: normal       ABD: soft non-tender without masses; no CVA tenderness     Imaging:  images and reports reviewed    Lab Review:    Recent Results (from the past 24 hour(s))   GLUCOSE, POC    Collection Time: 03/01/21  1:28 PM   Result Value Ref Range    Glucose (POC) 181 (H) 65 - 100 mg/dL    Performed by Jayde Samano          Assessment:     R ureteral and renal calculi    Plan:     1. I recommend proceeding with right ureteroscopic stone manipulation with stent exchange    2. Discussed aspects of surgical intervention, methods, risks including by not limited to infection, bleeding, damage to adjacent structures, need for additional procedures and the risks of general anesthetic. The patient understands the risks; any and all questions were answered to the patient's satisfaction.

## 2021-03-01 NOTE — ANESTHESIA POSTPROCEDURE EVALUATION
Procedure(s):  CYSTOSCOPY/RIGHT RETROGRADE URETEROSCOPY WITH LASER AND STENT exchange. general    Anesthesia Post Evaluation      Multimodal analgesia: multimodal analgesia used between 6 hours prior to anesthesia start to PACU discharge  Patient location during evaluation: PACU  Patient participation: complete - patient participated  Level of consciousness: awake and alert  Pain management: adequate  Airway patency: patent  Anesthetic complications: no  Cardiovascular status: acceptable  Respiratory status: acceptable  Hydration status: acceptable  Post anesthesia nausea and vomiting:  none      INITIAL Post-op Vital signs:   Vitals Value Taken Time   /91 03/01/21 1702   Temp 37 °C (98.6 °F) 03/01/21 1702   Pulse 87 03/01/21 1705   Resp 19 03/01/21 1705   SpO2 99 % 03/01/21 1705   Vitals shown include unvalidated device data.

## 2021-03-09 ENCOUNTER — OFFICE VISIT (OUTPATIENT)
Dept: INTERNAL MEDICINE CLINIC | Age: 56
End: 2021-03-09
Payer: COMMERCIAL

## 2021-03-09 VITALS
WEIGHT: 191 LBS | TEMPERATURE: 98.1 F | HEART RATE: 77 BPM | HEIGHT: 74 IN | BODY MASS INDEX: 24.51 KG/M2 | DIASTOLIC BLOOD PRESSURE: 95 MMHG | OXYGEN SATURATION: 95 % | RESPIRATION RATE: 16 BRPM | SYSTOLIC BLOOD PRESSURE: 152 MMHG

## 2021-03-09 DIAGNOSIS — G25.0 ESSENTIAL TREMOR: ICD-10-CM

## 2021-03-09 DIAGNOSIS — R80.9 TYPE 2 DIABETES MELLITUS WITH MICROALBUMINURIA, WITH LONG-TERM CURRENT USE OF INSULIN (HCC): ICD-10-CM

## 2021-03-09 DIAGNOSIS — I10 ESSENTIAL HYPERTENSION: ICD-10-CM

## 2021-03-09 DIAGNOSIS — N20.0 NEPHROLITHIASIS: Primary | ICD-10-CM

## 2021-03-09 DIAGNOSIS — D86.9 SARCOIDOSIS: ICD-10-CM

## 2021-03-09 DIAGNOSIS — F98.8 ATTENTION DEFICIT DISORDER, UNSPECIFIED HYPERACTIVITY PRESENCE: ICD-10-CM

## 2021-03-09 DIAGNOSIS — E11.29 TYPE 2 DIABETES MELLITUS WITH MICROALBUMINURIA, WITH LONG-TERM CURRENT USE OF INSULIN (HCC): ICD-10-CM

## 2021-03-09 DIAGNOSIS — K21.9 GASTROESOPHAGEAL REFLUX DISEASE WITHOUT ESOPHAGITIS: ICD-10-CM

## 2021-03-09 DIAGNOSIS — Z79.4 TYPE 2 DIABETES MELLITUS WITH MICROALBUMINURIA, WITH LONG-TERM CURRENT USE OF INSULIN (HCC): ICD-10-CM

## 2021-03-09 DIAGNOSIS — F41.9 ANXIETY: ICD-10-CM

## 2021-03-09 DIAGNOSIS — N17.9 AKI (ACUTE KIDNEY INJURY) (HCC): ICD-10-CM

## 2021-03-09 DIAGNOSIS — E78.5 HYPERLIPIDEMIA, UNSPECIFIED HYPERLIPIDEMIA TYPE: ICD-10-CM

## 2021-03-09 PROCEDURE — 99204 OFFICE O/P NEW MOD 45 MIN: CPT | Performed by: INTERNAL MEDICINE

## 2021-03-09 RX ORDER — SERTRALINE HYDROCHLORIDE 50 MG/1
50 TABLET, FILM COATED ORAL DAILY
Qty: 90 TAB | Refills: 3 | Status: SHIPPED | OUTPATIENT
Start: 2021-03-09 | End: 2022-06-24

## 2021-03-09 RX ORDER — INSULIN GLARGINE 100 [IU]/ML
20 INJECTION, SOLUTION SUBCUTANEOUS EVERY EVENING
Qty: 18 ML | Refills: 1 | Status: SHIPPED | OUTPATIENT
Start: 2021-03-09 | End: 2021-09-03 | Stop reason: SDUPTHER

## 2021-03-09 RX ORDER — DEXTROAMPHETAMINE SACCHARATE, AMPHETAMINE ASPARTATE, DEXTROAMPHETAMINE SULFATE AND AMPHETAMINE SULFATE 5; 5; 5; 5 MG/1; MG/1; MG/1; MG/1
20 TABLET ORAL
Qty: 60 TAB | Refills: 0 | Status: SHIPPED | OUTPATIENT
Start: 2021-03-09 | End: 2021-09-03

## 2021-03-09 RX ORDER — FENOFIBRIC ACID 135 MG/1
135 CAPSULE, DELAYED RELEASE ORAL DAILY
Qty: 90 CAP | Refills: 3 | Status: SHIPPED | OUTPATIENT
Start: 2021-03-09 | End: 2022-06-24

## 2021-03-09 RX ORDER — BUPROPION HYDROCHLORIDE 300 MG/1
300 TABLET ORAL
Qty: 90 TAB | Refills: 3 | Status: SHIPPED | OUTPATIENT
Start: 2021-03-09 | End: 2022-07-24 | Stop reason: SDUPTHER

## 2021-03-09 RX ORDER — PROPRANOLOL HYDROCHLORIDE 80 MG/1
80 CAPSULE, EXTENDED RELEASE ORAL DAILY
Qty: 90 CAP | Refills: 1 | Status: SHIPPED | OUTPATIENT
Start: 2021-03-09 | End: 2021-09-03 | Stop reason: SDUPTHER

## 2021-03-09 RX ORDER — ALPRAZOLAM 0.25 MG/1
0.25 TABLET ORAL
Qty: 60 TAB | Refills: 0 | Status: SHIPPED | OUTPATIENT
Start: 2021-03-09 | End: 2021-09-03 | Stop reason: SDUPTHER

## 2021-03-09 RX ORDER — OMEPRAZOLE 20 MG/1
20 CAPSULE, DELAYED RELEASE ORAL DAILY
Qty: 90 CAP | Refills: 3 | Status: SHIPPED | OUTPATIENT
Start: 2021-03-09 | End: 2022-05-05 | Stop reason: SDUPTHER

## 2021-03-09 NOTE — PROGRESS NOTES
Assessment and Plan   Diagnoses and all orders for this visit:    1. Nephrolithiasis  2. MATT (acute kidney injury) ([facility] [address]  -     METABOLIC PANEL, BASIC; Future  Admitted to the hospital 2/22-2/24 for MATT and hyperkalemia. Prior to presentation, he had undergone a right-sided urethral stenting about 3 weeks prior for kidney stones and was being treated for UTI with Bactrim. Was advised to go to the ER by his urologist due to renal function and elevated potassium up to 6.8. Kidney function and hyperkalemia improved on discharge. He is subsequently underwent a right laser lithotripsy and stent exchange on 3/1. Reports he had his stent removed yesterday. They are planning on further treatment due to residual stone. Recommend repeating BMP today. He had medications held as described below due to his hyperkalemia and MATT. Hopefully we can restart some of those if improved. 3. Sarcoidosis  -     REFERRAL TO PULMONARY DISEASE  Given kidney stones, likely needs treatment for his sarcoid. Referred to [facility] sarcoid clinic    4. Type 2 diabetes mellitus with microalbuminuria, with long-term current use of insulin ([facility])  -     insulin glargine (LANTUS,BASAGLAR) 100 unit/mL (3 mL) inpn; 20 Units by SubCUTAneous route every evening.  -     fenofibric acid (TRILIPIX ER) 135 mg capsule; Take 1 Cap by mouth daily.  -     LIPID PANEL; Future  -     MICROALBUMIN, UR, RAND W/ MICROALB/CREAT RATIO; Future  Hemoglobin A1c 6.8% in the hospital.  However, this is his A1c while on Lantus, glimepiride, Janumet, and Trulicity. His Lantus was decreased to 10 units on discharge and his glimepiride, Janumet, Trulicity were discontinued. He has not been able to check his sugars at home. He will likely need these medications restarted if able based on his renal function. We will check today    I also brought up that we will need to consider statin medication at some point    5.  Essential tremor  -     propranolol LA (INDERAL LA) 80 mg SR capsule; Take 1 Cap by mouth daily. Indications: tremor  Previously followed by Dr. Nayan Dunn. Previously on propranolol 60 mg daily. Had been working for a while although reports his symptoms have not started to get worse even before his admission. Sometimes finds it hard to write. Discussed increasing to 80 mg versus 120. Given recent hospitalization and patient preference, will go up to 80 mg.    6. Anxiety  -     buPROPion XL (Wellbutrin XL) 300 mg XL tablet; Take 1 Tab by mouth every morning. Indications: anxiety  -     sertraline (Zoloft) 50 mg tablet; Take 1 Tab by mouth daily. Indications: anxiety  -     ALPRAZolam (XANAX) 0.25 mg tablet; Take 1 Tab by mouth two (2) times daily as needed for Anxiety or Sleep. Max Daily Amount: 0.5 mg.  Uses Xanax about a couple times a week. Has been undergoing some recent stress with his medical issues and his mother was recently put into assisted living. Last fill 60 pills in October. No medication changes advised. Recommend continuing Xanax, Wellbutrin 300 daily, and Zoloft 50 mg    7. Hyperlipidemia, unspecified hyperlipidemia type  Currently on fenofibric acid. We discussed eventually doing a statin given his diabetes. Recommend holding off for now given other issues    8. Attention deficit disorder, unspecified hyperactivity presence  -     dextroamphetamine-amphetamine (AdderalL) 20 mg tablet; Take 1 Tab by mouth two (2) times daily as needed (Attention/Focus). Max Daily Amount: 40 mg. Working well for him. Uses when he feels like he needs it. Last filled 60 pills in November    9. Gastroesophageal reflux disease without esophagitis  -     omeprazole (PRILOSEC) 20 mg capsule; Take 1 Cap by mouth daily. Indications: GERD  Working well for him. Reports previously he ran out of the medications and symptoms returned. Continue omeprazole for now although consider famotidine trial in the future    10. Essential hypertension  Elevated today. Lisinopril was held on discharge given his MATT. Will recheck BMP and consider restarting    Of note, his vitamin D was low at 15.6. Consider discussing supplement at next visit  BMP    Vit D low 15.6    2. Right ureteroscopic stone manipulation with laser lithotripsy and basket extraction  4. Right ureteral stent exchange-6 x 28 double-J with long string        Benefits, risks, possible drug interactions, and side effects of all new medications were reviewed with the patient. Pt verbalized understanding. Return to clinic: Pending labs    An electronic signature was used to authenticate this note. Nash Freeman MD  [facility]  3/9/2021    No future appointments. History of Present Illness   Chief Complaint   Establish care, hospital follow-up    Josef Park is a 48 y.o. male     From dc summary:  Admit date: 2/22/2021     Discharge date and time: 2/24/2021 11:28 AM  47 yo hx of HTN, DM, sarcoidosis, bilateral nephrolithiasis s/p R ureteral stent, presented w/ MATT, hyperkalemia, hypoglycemia, UTI     1) MATT: much improved. Likely du  e to bactrim, NSAIDS.  Will d/c bactrim and ACEi. Devere Redo was following     2) Acute hyperkalemia: much improved. Due to MATT, bactrim.  Patient received kayexalate      3) Complicated UTI due to ureteral stent: UCx pending.  was on IV CTX. Will complete a course of omnicef      4) DM type 2 w/ hypoglycemia: A1C 6.8%. Hypoglycemia due to renal failure and multiple diabetic meds. Will d/c Amaryl, Janumet, Trulicity. Lantus was decreased to 10u daily.   F/u with PCP     5) Bilateral nephrolithiasis: s/p R ureteral stent.  Plan for outpatient procedure on 03/01.  Defer to Urology      6) Sarcoid: needs outpatient f/u    STOP taking these medications         trimethoprim-sulfamethoxazole (BACTRIM DS, SEPTRA DS) 160-800 mg per tablet Comments:   Reason for Stopping:            ketorolac (TORADOL) 10 mg tablet Comments:   Reason for Stopping:            dulaglutide (TRULICITY) 1.5 XZ/6.4 mL sub-q pen Comments:   Reason for Stopping:            SITagliptin-metFORMIN (Janumet XR) 50-1,000 mg TM24 Comments:   Reason for Stopping:            lisinopril (PRINIVIL, ZESTRIL) 10 mg tablet Comments:   Reason for Stopping:            glimepiride (AMARYL) 2 mg tablet Comments:   Reason for Stopping:                  Review of Systems   Constitutional: Negative for chills and fever. HENT: Negative for hearing loss. Eyes: Negative for blurred vision. Respiratory: Negative for shortness of breath. Cardiovascular: Negative for chest pain. Gastrointestinal: Negative for abdominal pain, blood in stool, constipation, diarrhea, melena, nausea and vomiting. Genitourinary: Negative for dysuria and hematuria. Musculoskeletal: Negative for joint pain. Skin: Negative for rash. Neurological: Negative for headaches. Past Medical History   No Known Allergies     Current Outpatient Medications   Medication Sig    insulin glargine (LANTUS,BASAGLAR) 100 unit/mL (3 mL) inpn 20 Units by SubCUTAneous route every evening.  omeprazole (PRILOSEC) 20 mg capsule Take 1 Cap by mouth daily. Indications: GERD    dextroamphetamine-amphetamine (AdderalL) 20 mg tablet Take 1 Tab by mouth two (2) times daily as needed (Attention/Focus). Max Daily Amount: 40 mg.    buPROPion XL (Wellbutrin XL) 300 mg XL tablet Take 1 Tab by mouth every morning. Indications: anxiety    sertraline (Zoloft) 50 mg tablet Take 1 Tab by mouth daily. Indications: anxiety    fenofibric acid (TRILIPIX ER) 135 mg capsule Take 1 Cap by mouth daily.  ALPRAZolam (XANAX) 0.25 mg tablet Take 1 Tab by mouth two (2) times daily as needed for Anxiety or Sleep. Max Daily Amount: 0.5 mg.  propranolol LA (INDERAL LA) 80 mg SR capsule Take 1 Cap by mouth daily. Indications: tremor    propranolol LA (INDERAL LA) 60 mg SR capsule Take 60 mg by mouth daily.      No current facility-administered medications for this visit. Patient Active Problem List   Diagnosis Code    Cervical stenosis of spinal canal M48.02    Sarcoidosis D86.9    Acute hyperkalemia E87.5    MATT (acute kidney injury) ([facility] [address] N17.9    Recurrent nephrolithiasis N20.0    Type 2 diabetes mellitus with microalbuminuria, with long-term current use of insulin ([facility]) E11.29, R80.9, Z79.4    Essential tremor G25.0    Anxiety F41.9    Hyperlipidemia, unspecified hyperlipidemia type E78.5    Attention deficit disorder, unspecified hyperactivity presence F98.8    Gastroesophageal reflux disease without esophagitis K21.9    Essential hypertension I10     Past Surgical History:   Procedure Laterality Date    HX CERVICAL FUSION      HX ORTHOPAEDIC      multi lumbar lami/fusions    HX TONSILLECTOMY      HX UROLOGICAL      Kidney Stones \"vacuumed\"    [place] ABDOMEN SURGERY PROC UNLISTED  2013    liver biopsy      Social History     Tobacco Use    Smoking status: Current Some Day Smoker    Smokeless tobacco: Never Used    Tobacco comment: occl cigar 5-6x/year   Substance Use Topics    Alcohol use: Not Currently     Comment: a few times a year      Family History   Problem Relation Age of Onset    Dementia Mother     Heart Attack Father     Cancer Neg Hx         Physical Exam   Vitals:       Visit Vitals  BP (!) 152/95 (BP 1 Location: Left upper arm, BP Patient Position: Sitting, BP Cuff Size: Adult)   Pulse 77   Temp 98.1 °F (36.7 °C) (Oral)   Resp 16   Ht 6' 2\" (1.88 m)   Wt 191 lb (86.6 kg)   SpO2 95%   BMI 24.52 kg/m²        Physical Exam  Constitutional:       General: He is not in acute distress. Appearance: He is well-developed. HENT:      Right Ear: Tympanic membrane, ear canal and external ear normal.      Left Ear: Tympanic membrane, ear canal and external ear normal.   Eyes:      Extraocular Movements: Extraocular movements intact.       Conjunctiva/sclera: Conjunctivae normal.   Neck: Musculoskeletal: Neck supple. Cardiovascular:      Rate and Rhythm: Normal rate and regular rhythm. Pulses: Normal pulses. Heart sounds: No murmur. No friction rub. No gallop. Pulmonary:      Effort: No respiratory distress. Breath sounds: No wheezing, rhonchi or rales. Abdominal:      General: Bowel sounds are normal. There is no distension. Palpations: Abdomen is soft. There is no hepatomegaly, splenomegaly or mass. Tenderness: There is no abdominal tenderness. There is no guarding. Skin:     General: Skin is warm. Findings: No rash. Neurological:      Mental Status: He is alert.

## 2021-03-10 LAB
ANION GAP SERPL CALC-SCNC: 6 MMOL/L (ref 5–15)
BUN SERPL-MCNC: 17 MG/DL (ref 6–20)
BUN/CREAT SERPL: 11 (ref 12–20)
CALCIUM SERPL-MCNC: 9.7 MG/DL (ref 8.5–10.1)
CHLORIDE SERPL-SCNC: 105 MMOL/L (ref 97–108)
CHOLEST SERPL-MCNC: 235 MG/DL
CO2 SERPL-SCNC: 25 MMOL/L (ref 21–32)
CREAT SERPL-MCNC: 1.5 MG/DL (ref 0.7–1.3)
CREAT UR-MCNC: 147 MG/DL
GLUCOSE SERPL-MCNC: 266 MG/DL (ref 65–100)
HDLC SERPL-MCNC: 27 MG/DL
HDLC SERPL: 8.7 {RATIO} (ref 0–5)
LDLC SERPL CALC-MCNC: 131.6 MG/DL (ref 0–100)
LIPID PROFILE,FLP: ABNORMAL
MICROALBUMIN UR-MCNC: 17.7 MG/DL
MICROALBUMIN/CREAT UR-RTO: 120 MG/G (ref 0–30)
POTASSIUM SERPL-SCNC: 4.7 MMOL/L (ref 3.5–5.1)
SODIUM SERPL-SCNC: 136 MMOL/L (ref 136–145)
TRIGL SERPL-MCNC: 382 MG/DL (ref ?–150)
VLDLC SERPL CALC-MCNC: 76.4 MG/DL

## 2021-03-11 ENCOUNTER — TELEPHONE (OUTPATIENT)
Dept: INTERNAL MEDICINE CLINIC | Age: 56
End: 2021-03-11

## 2021-03-11 NOTE — TELEPHONE ENCOUNTER
Call made to patient to advise of providers message ; no answer LVM to advise for a call back to go over message in detail. -- awaiting a call back. Call made to [facility] and spoke to nurse of Dr ANTHONY chaparro nurse from notes it only states that patient has a f/u visit on April 20th for the kidney stones no detailed plan has been put in place. I have medical records faxing over the last two office notes to be reviewed.

## 2021-03-11 NOTE — TELEPHONE ENCOUNTER
----- Message from Mak Sierra sent at 3/11/2021 11:59 AM EST -----  Regarding: Dr. Mya Lozoya  Patient return call    Caller's first and last name and relationship (if not the patient): Pt      Best contact number(s):[phone]      Whose call is being returned: Kristen Galindo      Details to clarify the request:pt had a missed call and is returning call.        Mak Sierra

## 2021-03-11 NOTE — PROGRESS NOTES
Please call the pt and let him know that his kidney function is better, but not all the way back to normal.  Did he have any labs done anywhere between 2016 and his hospitalization? It would be helpful to know where his creatinine was more recently in the past couple years. Based on his labs, I recommend restarting his glimepiride and trulicity but would continue to hold his janumet. I would continue using 10 units of glargine for now. Is he able to check his sugar at home? Recommend checking at least once in the morning. If he develops any low blood sugar (less than 70 or symptoms), he needs to let us know. Also recommend continuing to hold his lisinopril for now. Does he have a BP cuff at home? Rec monitoring at least every other day. If BP consistently 160 or greater, he should let us know. We may want to recheck is labs in the next couple weeks, but I need to know urology's plan first.  So, would you also please call his urologist's office Dr. Cecilio Grissom ([facility]) - what is their plan with his kidney stones; what's the plan for their procedures?  ====  Microalbumin creatinine ratio 120. . Triglyceride 382. Creatinine 1.50 which is down from previous.     restart glimepiride, trulicity  continue to hold lisinopril and janumet  Plan for statin; adjust meds for trig  Recheck cr pending urology plan

## 2021-03-11 NOTE — TELEPHONE ENCOUNTER
----- Message from Erwin Tariq MD sent at 1/56/3379  8:55 PM EST -----  Please call the pt and let him know that his kidney function is better, but not all the way back to normal.  Did he have any labs done anywhere between 2016 and his hospitalization? It would be helpful to know where his creatinine was more recently in the past couple years. Based on his labs, I recommend restarting his glimepiride and trulicity but would continue to hold his janumet. I would continue using 10 units of glargine for now. Is he able to check his sugar at home? Recommend checking at least once in the morning. If he develops any low blood sugar (less than 70 or symptoms), he needs to let us know. Also recommend continuing to hold his lisinopril for now. Does he have a BP cuff at home? Rec monitoring at least every other day. If BP consistently 160 or greater, he should let us know. We may want to recheck is labs in the next couple weeks, but I need to know urology's plan first.  So, would you also please call his urologist's office Dr. Ben Lockhart ([facility]) - what is their plan with his kidney stones; what's the plan for their procedures?  ====  Microalbumin creatinine ratio 120. . Triglyceride 382. Creatinine 1.50 which is down from previous.     restart glimepiride, trulicity  continue to hold lisinopril and janumet  Plan for statin; adjust meds for trig  Recheck cr pending urology plan

## 2021-03-12 NOTE — TELEPHONE ENCOUNTER
----- Message from Shaye Prasad sent at 3/12/2021 10:28 AM EST -----  Regarding: /telephone  Contact: [phone]  Patient return call    Caller's first and last name and relationship (if not the patient): N/A      Best contact number(s):[phone]      Whose call is being returned: JOHAN DIAZ [facility], [facility]       Details to clarify the request:N/A      Shaye Prasad

## 2021-03-20 ENCOUNTER — TELEPHONE (OUTPATIENT)
Dept: INTERNAL MEDICINE CLINIC | Age: 56
End: 2021-03-20

## 2021-03-20 NOTE — TELEPHONE ENCOUNTER
Patient called as his sugars are in 400s and at the time he called I did not have chart in front of me. He told me his meds were held for his diabetes except insulin and now his sugars are in 400s and he didn't know what to do.  Since I did not have his kidney function in front of me I told him to increase his insulin to 20 units and if it continued to be elevated we could go up to 40 units if needed until he reaches his PCP on Monday

## 2021-03-22 ENCOUNTER — OFFICE VISIT (OUTPATIENT)
Dept: INTERNAL MEDICINE CLINIC | Age: 56
End: 2021-03-22
Payer: COMMERCIAL

## 2021-03-22 ENCOUNTER — TELEPHONE (OUTPATIENT)
Dept: INTERNAL MEDICINE CLINIC | Age: 56
End: 2021-03-22

## 2021-03-22 VITALS
SYSTOLIC BLOOD PRESSURE: 120 MMHG | HEART RATE: 69 BPM | DIASTOLIC BLOOD PRESSURE: 83 MMHG | RESPIRATION RATE: 20 BRPM | HEIGHT: 74 IN | OXYGEN SATURATION: 100 % | WEIGHT: 187 LBS | TEMPERATURE: 98.3 F | BODY MASS INDEX: 24 KG/M2

## 2021-03-22 DIAGNOSIS — E11.29 TYPE 2 DIABETES MELLITUS WITH MICROALBUMINURIA, WITH LONG-TERM CURRENT USE OF INSULIN (HCC): Primary | ICD-10-CM

## 2021-03-22 DIAGNOSIS — R80.9 TYPE 2 DIABETES MELLITUS WITH MICROALBUMINURIA, WITH LONG-TERM CURRENT USE OF INSULIN (HCC): Primary | ICD-10-CM

## 2021-03-22 DIAGNOSIS — Z79.4 TYPE 2 DIABETES MELLITUS WITH MICROALBUMINURIA, WITH LONG-TERM CURRENT USE OF INSULIN (HCC): Primary | ICD-10-CM

## 2021-03-22 DIAGNOSIS — Z11.59 NEED FOR HEPATITIS C SCREENING TEST: ICD-10-CM

## 2021-03-22 LAB
ALBUMIN SERPL-MCNC: 4 G/DL (ref 3.5–5)
ALBUMIN/GLOB SERPL: 1 {RATIO} (ref 1.1–2.2)
ALP SERPL-CCNC: 125 U/L (ref 45–117)
ALT SERPL-CCNC: 35 U/L (ref 12–78)
ANION GAP SERPL CALC-SCNC: 6 MMOL/L (ref 5–15)
AST SERPL-CCNC: 28 U/L (ref 15–37)
BILIRUB SERPL-MCNC: 0.8 MG/DL (ref 0.2–1)
BILIRUB UR QL STRIP: NEGATIVE
BUN SERPL-MCNC: 21 MG/DL (ref 6–20)
BUN/CREAT SERPL: 13 (ref 12–20)
CALCIUM SERPL-MCNC: 9.9 MG/DL (ref 8.5–10.1)
CHLORIDE SERPL-SCNC: 94 MMOL/L (ref 97–108)
CO2 SERPL-SCNC: 29 MMOL/L (ref 21–32)
CREAT SERPL-MCNC: 1.64 MG/DL (ref 0.7–1.3)
GLOBULIN SER CALC-MCNC: 4.2 G/DL (ref 2–4)
GLUCOSE SERPL-MCNC: 374 MG/DL (ref 65–100)
GLUCOSE UR-MCNC: NORMAL MG/DL
KETONES P FAST UR STRIP-MCNC: NEGATIVE MG/DL
PH UR STRIP: 5.5 [PH] (ref 4.6–8)
POTASSIUM SERPL-SCNC: 4.8 MMOL/L (ref 3.5–5.1)
PROT SERPL-MCNC: 8.2 G/DL (ref 6.4–8.2)
PROT UR QL STRIP: NEGATIVE
SODIUM SERPL-SCNC: 129 MMOL/L (ref 136–145)
SP GR UR STRIP: 1.02 (ref 1–1.03)
UA UROBILINOGEN AMB POC: NORMAL (ref 0.2–1)
URINALYSIS CLARITY POC: CLEAR
URINALYSIS COLOR POC: YELLOW
URINE BLOOD POC: NEGATIVE
URINE LEUKOCYTES POC: NEGATIVE
URINE NITRITES POC: NEGATIVE

## 2021-03-22 PROCEDURE — 81001 URINALYSIS AUTO W/SCOPE: CPT | Performed by: INTERNAL MEDICINE

## 2021-03-22 PROCEDURE — 99214 OFFICE O/P EST MOD 30 MIN: CPT | Performed by: INTERNAL MEDICINE

## 2021-03-22 RX ORDER — LANCETS
EACH MISCELLANEOUS
Qty: 100 EACH | Refills: 5 | Status: SHIPPED | OUTPATIENT
Start: 2021-03-22 | End: 2021-03-31

## 2021-03-22 RX ORDER — BLOOD SUGAR DIAGNOSTIC
STRIP MISCELLANEOUS
Qty: 100 STRIP | Refills: 5 | Status: SHIPPED | OUTPATIENT
Start: 2021-03-22

## 2021-03-22 NOTE — PROGRESS NOTES
Please call the pt and let him know that his sodium is low, likely from his sugar and from dehydration. REc increasing fluid intake. His kidney function is about the same as it was when we checked last time. His urine dip was normal, no ketones. Rec doing his DM meds like we discussed - 30 units insulin glargine, restart glimeperide and trulicity. Follow up as scheduled  ===  Alk phos 125 from normal last check.  monitor

## 2021-03-22 NOTE — TELEPHONE ENCOUNTER
Call made to patient; no answer--LVM to advise of providers message and recommendations. [facility] message sent as well.

## 2021-03-22 NOTE — PATIENT INSTRUCTIONS
Start taking glimeperide and trulicty again 
Continue taking insulin glargine 30 units daily, but if you are having low sugar (less than 70), then decrease back down to 20 units

## 2021-03-22 NOTE — TELEPHONE ENCOUNTER
----- Message from Jason Diaz MD sent at 1/63/6596  2:24 PM EDT -----  Please call the pt and let him know that his sodium is low, likely from his sugar and from dehydration. REc increasing fluid intake. His kidney function is about the same as it was when we checked last time. His urine dip was normal, no ketones. Rec doing his DM meds like we discussed - 30 units insulin glargine, restart glimeperide and trulicity. Follow up as scheduled  ===  Alk phos 125 from normal last check.  monitor

## 2021-03-22 NOTE — TELEPHONE ENCOUNTER
Call made to patient. Patient was schedule for same day appt d/t high blood sugar. Appt made for 3/22/21 at 1015A.

## 2021-03-22 NOTE — PROGRESS NOTES
Assessment and Plan   Diagnoses and all orders for this visit:    1. Type 2 diabetes mellitus with microalbuminuria, with long-term current use of insulin ([facility])  -     METABOLIC PANEL, COMPREHENSIVE; Future  -     lancets misc; Monitor sugar 3-4 times a day  -     AMB POC URINALYSIS DIP STICK AUTO W/ MICRO  -     glucose blood VI test strips (OneTouch Verio test strips) strip; Use 3-4 times daily to monitor glucose  Glucose running in 400-500s. Has been feeling little more dizzy and thirsty. Has been increasing his insulin and took 30 units of glargine yesterday. That is the only diabetes medicine he has been taking currently. Denies any lightheadedness, chest pain, shortness of breath, abdominal pain, nausea, vomiting, diarrhea, constipation. Stat labs done today. No anion gap. No ketones in urine. Sodium 129. Hyponatremia likely related to dehydration and hyperglycemia. Recommend restarting glimepiride 2 mg daily and Trulicity 1.5 mg every Sunday. Continue to hold Janumet-creatinine a little bit worse from last time. 2. Need for hepatitis C screening test  -     HEPATITIS C AB; Future    Of note, thinks he may have passed a stone since his last visit. Benefits, risks, possible drug interactions, and side effects of all new medications were reviewed with the patient. Pt verbalized understanding. Return to clinic: As scheduled    An electronic signature was used to authenticate this note.   Wilian Patricio MD  [facility]  3/22/2021    Future Appointments   Date Time Provider Patti Gill   5/36/5148  5:86 PM [facility]        Subjective   Chief Complaint   High glucose    Vinh Huggins is a 48 y.o. male           Objective   Vitals:       Visit Vitals  /83   Pulse 69   Temp 98.3 °F (36.8 °C) (Oral)   Resp 20   Ht 6' 2\" (1.88 m)   Wt 187 lb (84.8 kg)   SpO2 100%   BMI 24.01 kg/m²        Physical Exam  Constitutional:       Appearance: Normal appearance. He is not ill-appearing. Cardiovascular:      Rate and Rhythm: Normal rate and regular rhythm. Heart sounds: No murmur. No friction rub. No gallop. Pulmonary:      Effort: No respiratory distress. Breath sounds: Normal breath sounds. No wheezing, rhonchi or rales. Abdominal:      General: Bowel sounds are normal. There is no distension. Palpations: Abdomen is soft. There is no mass. Tenderness: There is no abdominal tenderness. There is no guarding. Neurological:      Mental Status: He is alert. Current Outpatient Medications   Medication Sig    lancets misc Monitor sugar 3-4 times a day    glucose blood VI test strips (OneTouch Verio test strips) strip Use 3-4 times daily to monitor glucose    insulin glargine (LANTUS,BASAGLAR) 100 unit/mL (3 mL) inpn 20 Units by SubCUTAneous route every evening.  omeprazole (PRILOSEC) 20 mg capsule Take 1 Cap by mouth daily. Indications: GERD    buPROPion XL (Wellbutrin XL) 300 mg XL tablet Take 1 Tab by mouth every morning. Indications: anxiety    sertraline (Zoloft) 50 mg tablet Take 1 Tab by mouth daily. Indications: anxiety    fenofibric acid (TRILIPIX ER) 135 mg capsule Take 1 Cap by mouth daily.  ALPRAZolam (XANAX) 0.25 mg tablet Take 1 Tab by mouth two (2) times daily as needed for Anxiety or Sleep. Max Daily Amount: 0.5 mg.  propranolol LA (INDERAL LA) 80 mg SR capsule Take 1 Cap by mouth daily. Indications: tremor    propranolol LA (INDERAL LA) 60 mg SR capsule Take 60 mg by mouth daily.  dextroamphetamine-amphetamine (AdderalL) 20 mg tablet Take 1 Tab by mouth two (2) times daily as needed (Attention/Focus). Max Daily Amount: 40 mg. No current facility-administered medications for this visit.

## 2021-03-23 LAB
HCV AB SERPL QL IA: NONREACTIVE
HCV COMMENT,HCGAC: NORMAL

## 2021-03-30 NOTE — PROGRESS NOTES
Assessment and Plan   Diagnoses and all orders for this visit:    1. Type 2 diabetes mellitus with microalbuminuria, with long-term current use of insulin ([facility])  -     lancets (One Touch Delica) 33 gauge misc; Use 1-4 times daily to monitor sugar  -     METABOLIC PANEL, BASIC; Future  Blood sugars are better than where he was but still elevated. Running in high 100s, 200s. No longer dizzy or lightheaded. Since his sugar is getting better, I recommend continuing glimepiride 2 mg, Trulicity 1.5, glargine 30 units. We will plan to recheck BMP in 2 weeks to see if kidney function has improved enough to add Janumet back. 2. Vitamin D deficiency  -     cholecalciferol (VITAMIN D3) (1000 Units /25 mcg) tablet; Take 1 Tab by mouth daily. Vitamin D 15.6. Recommend supplement    3. Essential tremor  We had increased his propranolol to 80 mg from 60. He is not sure if he has noticed much improvement with this dose. Notices it mostly when he is writing. He is not interested in increasing his dose at this time. Recommend continuing to monitor. No medication changes recommended    4. Meralgia paresthetica of left side  Diagnosed by neurology. Continues to have some pain in his thigh. Hopefully will get some improvement with better glucose control as well. Continue to monitor. Benefits, risks, possible drug interactions, and side effects of all new medications were reviewed with the patient. Pt verbalized understanding. Return to clinic: 2 weeks for BMP. Will decide follow-up based on labs and changes we make    An electronic signature was used to authenticate this note. Robbie Davis MD  [facility]  3/31/2021    No future appointments.      Subjective   Chief Complaint   Diabetes    Cammie Hoyt is a 48 y.o. male           Objective   Vitals:       Visit Vitals  /88 (BP 1 Location: Left upper arm, BP Patient Position: Sitting, BP Cuff Size: Adult) Pulse 73   Temp 98.1 °F (36.7 °C) (Oral)   Resp 14   Ht 6' 2\" (1.88 m)   Wt 194 lb (88 kg)   SpO2 98%   BMI 24.91 kg/m²        Physical Exam  Constitutional:       Appearance: Normal appearance. He is not ill-appearing. Cardiovascular:      Rate and Rhythm: Normal rate. Pulmonary:      Effort: No respiratory distress. Neurological:      Mental Status: He is alert. Current Outpatient Medications   Medication Sig    multivitamin (ONE A DAY) tablet Take 1 Tab by mouth daily.  cholecalciferol (VITAMIN D3) (1000 Units /25 mcg) tablet Take 1 Tab by mouth daily.  lancets (One Touch Delica) 33 gauge misc Use 1-4 times daily to monitor sugar    glucose blood VI test strips (OneTouch Verio test strips) strip Use 3-4 times daily to monitor glucose    insulin glargine (LANTUS,BASAGLAR) 100 unit/mL (3 mL) inpn 20 Units by SubCUTAneous route every evening.  omeprazole (PRILOSEC) 20 mg capsule Take 1 Cap by mouth daily. Indications: GERD    dextroamphetamine-amphetamine (AdderalL) 20 mg tablet Take 1 Tab by mouth two (2) times daily as needed (Attention/Focus). Max Daily Amount: 40 mg.    buPROPion XL (Wellbutrin XL) 300 mg XL tablet Take 1 Tab by mouth every morning. Indications: anxiety    sertraline (Zoloft) 50 mg tablet Take 1 Tab by mouth daily. Indications: anxiety    fenofibric acid (TRILIPIX ER) 135 mg capsule Take 1 Cap by mouth daily.  ALPRAZolam (XANAX) 0.25 mg tablet Take 1 Tab by mouth two (2) times daily as needed for Anxiety or Sleep. Max Daily Amount: 0.5 mg.  propranolol LA (INDERAL LA) 80 mg SR capsule Take 1 Cap by mouth daily. Indications: tremor    propranolol LA (INDERAL LA) 60 mg SR capsule Take 60 mg by mouth daily. No current facility-administered medications for this visit.

## 2021-03-31 ENCOUNTER — OFFICE VISIT (OUTPATIENT)
Dept: INTERNAL MEDICINE CLINIC | Age: 56
End: 2021-03-31
Payer: COMMERCIAL

## 2021-03-31 VITALS
SYSTOLIC BLOOD PRESSURE: 131 MMHG | WEIGHT: 194 LBS | TEMPERATURE: 98.1 F | HEIGHT: 74 IN | BODY MASS INDEX: 24.9 KG/M2 | HEART RATE: 73 BPM | RESPIRATION RATE: 14 BRPM | OXYGEN SATURATION: 98 % | DIASTOLIC BLOOD PRESSURE: 88 MMHG

## 2021-03-31 DIAGNOSIS — G57.12 MERALGIA PARESTHETICA OF LEFT SIDE: ICD-10-CM

## 2021-03-31 DIAGNOSIS — Z79.4 TYPE 2 DIABETES MELLITUS WITH MICROALBUMINURIA, WITH LONG-TERM CURRENT USE OF INSULIN (HCC): Primary | ICD-10-CM

## 2021-03-31 DIAGNOSIS — E11.29 TYPE 2 DIABETES MELLITUS WITH MICROALBUMINURIA, WITH LONG-TERM CURRENT USE OF INSULIN (HCC): Primary | ICD-10-CM

## 2021-03-31 DIAGNOSIS — R80.9 TYPE 2 DIABETES MELLITUS WITH MICROALBUMINURIA, WITH LONG-TERM CURRENT USE OF INSULIN (HCC): Primary | ICD-10-CM

## 2021-03-31 DIAGNOSIS — G25.0 ESSENTIAL TREMOR: ICD-10-CM

## 2021-03-31 DIAGNOSIS — E55.9 VITAMIN D DEFICIENCY: ICD-10-CM

## 2021-03-31 PROCEDURE — 99214 OFFICE O/P EST MOD 30 MIN: CPT | Performed by: INTERNAL MEDICINE

## 2021-03-31 RX ORDER — BISMUTH SUBSALICYLATE 262 MG
1 TABLET,CHEWABLE ORAL DAILY
COMMUNITY
End: 2021-11-24

## 2021-03-31 RX ORDER — MELATONIN
1000 DAILY
Qty: 90 TAB | Refills: 3 | Status: SHIPPED | OUTPATIENT
Start: 2021-03-31 | End: 2022-06-07

## 2021-03-31 RX ORDER — LANCETS 33 GAUGE
EACH MISCELLANEOUS
Qty: 100 LANCET | Refills: 3 | Status: SHIPPED | OUTPATIENT
Start: 2021-03-31

## 2021-04-17 LAB
ANION GAP SERPL CALC-SCNC: 4 MMOL/L (ref 5–15)
BUN SERPL-MCNC: 11 MG/DL (ref 6–20)
BUN/CREAT SERPL: 9 (ref 12–20)
CALCIUM SERPL-MCNC: 9 MG/DL (ref 8.5–10.1)
CHLORIDE SERPL-SCNC: 105 MMOL/L (ref 97–108)
CO2 SERPL-SCNC: 27 MMOL/L (ref 21–32)
CREAT SERPL-MCNC: 1.22 MG/DL (ref 0.7–1.3)
GLUCOSE SERPL-MCNC: 299 MG/DL (ref 65–100)
POTASSIUM SERPL-SCNC: 4.4 MMOL/L (ref 3.5–5.1)
SODIUM SERPL-SCNC: 136 MMOL/L (ref 136–145)

## 2021-04-19 NOTE — PROGRESS NOTES
[facility] sent. Sodium 136, creatinine 1.22, glucose 299.  =  Your test results are normal except for the following: Your glucose is elevated. Your kidney function is back to normal, so I recommend restarting your Janumet. You need a prescription for that? Let's plan to follow-up in 3 months to recheck your hemoglobin A1c. If you are ready to make an appointment, please respond to this message with available times and dates, and my nurse can help get that appointment scheduled for you.

## 2021-08-24 ENCOUNTER — TELEPHONE (OUTPATIENT)
Dept: INTERNAL MEDICINE CLINIC | Age: 56
End: 2021-08-24

## 2021-08-24 NOTE — TELEPHONE ENCOUNTER
Patient requests refill of Janumet - I do not see this in patients chart. I made patient a med check appointment for 09/03.

## 2021-08-24 NOTE — TELEPHONE ENCOUNTER
Return call made to ayden--no answer-- lvm to advise of the following message per PCP from last visit in March. We will plan to recheck BMP in 2 weeks to see if kidney function has improved enough to add Janumet back. Per chart review labs has not been rechecked.  Patient has appt with PCP on 9/3/21

## 2021-09-03 ENCOUNTER — OFFICE VISIT (OUTPATIENT)
Dept: INTERNAL MEDICINE CLINIC | Age: 56
End: 2021-09-03
Payer: COMMERCIAL

## 2021-09-03 VITALS
OXYGEN SATURATION: 97 % | SYSTOLIC BLOOD PRESSURE: 124 MMHG | DIASTOLIC BLOOD PRESSURE: 80 MMHG | HEIGHT: 74 IN | HEART RATE: 67 BPM | BODY MASS INDEX: 25.41 KG/M2 | WEIGHT: 198 LBS | TEMPERATURE: 97.5 F | RESPIRATION RATE: 14 BRPM

## 2021-09-03 DIAGNOSIS — G25.0 ESSENTIAL TREMOR: ICD-10-CM

## 2021-09-03 DIAGNOSIS — N17.9 AKI (ACUTE KIDNEY INJURY) (HCC): ICD-10-CM

## 2021-09-03 DIAGNOSIS — N20.0 RECURRENT NEPHROLITHIASIS: ICD-10-CM

## 2021-09-03 DIAGNOSIS — D86.9 SARCOIDOSIS: ICD-10-CM

## 2021-09-03 DIAGNOSIS — E11.29 TYPE 2 DIABETES MELLITUS WITH MICROALBUMINURIA, WITH LONG-TERM CURRENT USE OF INSULIN (HCC): Primary | ICD-10-CM

## 2021-09-03 DIAGNOSIS — I10 ESSENTIAL HYPERTENSION: ICD-10-CM

## 2021-09-03 DIAGNOSIS — Z79.4 TYPE 2 DIABETES MELLITUS WITH MICROALBUMINURIA, WITH LONG-TERM CURRENT USE OF INSULIN (HCC): Primary | ICD-10-CM

## 2021-09-03 DIAGNOSIS — F41.9 ANXIETY: ICD-10-CM

## 2021-09-03 DIAGNOSIS — R80.9 TYPE 2 DIABETES MELLITUS WITH MICROALBUMINURIA, WITH LONG-TERM CURRENT USE OF INSULIN (HCC): Primary | ICD-10-CM

## 2021-09-03 LAB — HBA1C MFR BLD HPLC: 11.3 %

## 2021-09-03 PROCEDURE — 99215 OFFICE O/P EST HI 40 MIN: CPT | Performed by: INTERNAL MEDICINE

## 2021-09-03 PROCEDURE — 83036 HEMOGLOBIN GLYCOSYLATED A1C: CPT | Performed by: INTERNAL MEDICINE

## 2021-09-03 RX ORDER — INSULIN GLARGINE 100 [IU]/ML
30 INJECTION, SOLUTION SUBCUTANEOUS EVERY EVENING
Qty: 27 ML | Refills: 1 | Status: SHIPPED | OUTPATIENT
Start: 2021-09-03 | End: 2022-05-05 | Stop reason: SDUPTHER

## 2021-09-03 RX ORDER — GLIMEPIRIDE 2 MG/1
2 TABLET ORAL DAILY
Qty: 90 TABLET | Refills: 1 | Status: SHIPPED | OUTPATIENT
Start: 2021-09-03 | End: 2022-05-05 | Stop reason: SDUPTHER

## 2021-09-03 RX ORDER — ATORVASTATIN CALCIUM 20 MG/1
20 TABLET, FILM COATED ORAL DAILY
COMMUNITY

## 2021-09-03 RX ORDER — GLIMEPIRIDE 2 MG/1
2 TABLET ORAL
COMMUNITY
End: 2021-09-03 | Stop reason: SDUPTHER

## 2021-09-03 RX ORDER — TAMSULOSIN HYDROCHLORIDE 0.4 MG/1
0.4 CAPSULE ORAL DAILY
COMMUNITY
Start: 2021-06-28 | End: 2021-09-03

## 2021-09-03 RX ORDER — LISINOPRIL 10 MG/1
10 TABLET ORAL DAILY
Qty: 90 TABLET | Refills: 1 | Status: SHIPPED | OUTPATIENT
Start: 2021-09-03 | End: 2022-05-05 | Stop reason: SDUPTHER

## 2021-09-03 RX ORDER — PROPRANOLOL HYDROCHLORIDE 80 MG/1
80 CAPSULE, EXTENDED RELEASE ORAL DAILY
Qty: 90 CAPSULE | Refills: 1 | Status: SHIPPED | OUTPATIENT
Start: 2021-09-03

## 2021-09-03 RX ORDER — LISINOPRIL 10 MG/1
10 TABLET ORAL DAILY
COMMUNITY
End: 2021-09-03 | Stop reason: SDUPTHER

## 2021-09-03 RX ORDER — ALPRAZOLAM 0.25 MG/1
0.25 TABLET ORAL
Qty: 60 TABLET | Refills: 0 | Status: SHIPPED | OUTPATIENT
Start: 2021-09-03 | End: 2022-01-13 | Stop reason: SDUPTHER

## 2021-09-03 RX ORDER — PANTOPRAZOLE SODIUM 40 MG/1
40 TABLET, DELAYED RELEASE ORAL DAILY
COMMUNITY
End: 2021-11-30 | Stop reason: ALTCHOICE

## 2021-09-03 NOTE — ASSESSMENT & PLAN NOTE
3/9/21 - Admitted to the hospital 2/22-2/24 for MATT and hyperkalemia. Prior to presentation, he had undergone a right-sided urethral stenting about 3 weeks prior for kidney stones and was being treated for UTI with Bactrim. Was advised to go to the ER by his urologist due to renal function and elevated potassium up to 6.8. Kidney function and hyperkalemia improved on discharge. He is subsequently underwent a right laser lithotripsy and stent exchange on 3/1. Reports he had his stent removed yesterday. They are planning on further treatment due to residual stone.     Recommend repeating BMP today. He had medications held as described below due to his hyperkalemia and MATT. Hopefully we can restart some of those if improved.

## 2021-09-03 NOTE — ASSESSMENT & PLAN NOTE
Last visit:  We had increased his propranolol to 80 mg from 60. He is not sure if he has noticed much improvement with this dose. Notices it mostly when he is writing. He is not interested in increasing his dose at this time. Recommend continuing to monitor. No medication changes recommended  ====  Well-controlled with propranolol 80 mg daily.   Continue, no changes recommended

## 2021-09-03 NOTE — PROGRESS NOTES
Note   Chief Complaint   dm    Garrett Tariq is a 55 y.o. male     1. Type 2 diabetes mellitus with microalbuminuria, with long-term current use of insulin ([facility])  Assessment & Plan:  Reports being out of his Janumet and Trulicity for a while. Has also been missing doses of glargine. Reports having a lot of responsibilities and moving and forgetting medications. Has been consistent with his glimepiride. A1c 11.3%. Likely related to being out of medications for the past 1-2 months. Recommend restarting Janumet  once daily and Trulicity. However since he has been out of Trulicity for a while, recommend starting at lower dose 0.75 weekly for now. Plan to increase to 1.5 if tolerated. Counseled on being consistent with glargine-on 30 units daily. Continue glimepiride 2 mg daily. Advised monitoring glucose. Given sheet to track sugars especially week prior to next visit. Continue lisinopril 10 mg and atorvastatin 20  Orders:  -     AMB POC HEMOGLOBIN A1C  -     dulaglutide (TRULICITY) 9.00 DA/4.2 mL sub-q pen; 0.5 mL by SubCUTAneous route every seven (7) days for 30 days. Indications: type 2 diabetes mellitus, Normal, Disp-2 mL, R-0  -     SITagliptin-metFORMIN (JANUMET) 50-1,000 mg per tablet; Take 1 Tablet by mouth daily (with breakfast). Indications: type 2 diabetes mellitus, Normal, Disp-90 Tablet, R-1  -     glimepiride (AMARYL) 2 mg tablet; Take 1 Tablet by mouth daily. Indications: type 2 diabetes mellitus, Normal, Disp-90 Tablet, R-1  -     insulin glargine (LANTUS,BASAGLAR) 100 unit/mL (3 mL) inpn; 30 Units by SubCUTAneous route every evening. Indications: type 2 diabetes mellitus, Normal, Disp-27 mL, R-1  2. Anxiety  Assessment & Plan:  Last note:  Uses Xanax about a couple times a week. Has been undergoing some recent stress with his medical issues and his mother was recently put into assisted living. Last fill 60 pills in October.       No medication changes advised.   Recommend continuing Xanax, Wellbutrin 300 daily, and Zoloft 50 mg  =======  Well-controlled on current regimen. Continue Xanax 0.25 mg twice a day as needed, Wellbutrin 300 mg XL daily, and Zoloft 50 mg daily  Orders:  -     ALPRAZolam (XANAX) 0.25 mg tablet; Take 1 Tablet by mouth two (2) times daily as needed for Anxiety or Sleep. Max Daily Amount: 0.5 mg., Normal, Disp-60 Tablet, R-0  3. Essential tremor  Assessment & Plan:  Last visit:  We had increased his propranolol to 80 mg from 60. He is not sure if he has noticed much improvement with this dose. Notices it mostly when he is writing. He is not interested in increasing his dose at this time. Recommend continuing to monitor. No medication changes recommended  ====  Well-controlled with propranolol 80 mg daily. Continue, no changes recommended  Orders:  -     propranolol LA (INDERAL LA) 80 mg SR capsule; Take 1 Capsule by mouth daily. Indications: tremor, Normal, Disp-90 Capsule, R-1  4. Essential hypertension  Assessment & Plan:  Well-controlled on lisinopril 10 mg daily, continue, no changes recommended  Orders:  -     lisinopriL (PRINIVIL, ZESTRIL) 10 mg tablet; Take 1 Tablet by mouth daily. Indications: high blood pressure, Normal, Disp-90 Tablet, R-1  5. Sarcoidosis  Assessment & Plan:  Last visit:  Given kidney stones, likely needs treatment for his sarcoid. Referred to [facility] sarcoid clinic  =====  Since last visit, has established care with sarcoid clinic at [facility]. Recently had PFTs done. Continue to follow with sarcoid clinic  6. Recurrent nephrolithiasis  Assessment & Plan:  3/9/21 - Admitted to the hospital 2/22-2/24 for MATT and hyperkalemia. Prior to presentation, he had undergone a right-sided urethral stenting about 3 weeks prior for kidney stones and was being treated for UTI with Bactrim. Was advised to go to the ER by his urologist due to renal function and elevated potassium up to 6.8. Kidney function and hyperkalemia improved on discharge. He is subsequently underwent a right laser lithotripsy and stent exchange on 3/1. Reports he had his stent removed yesterday. They are planning on further treatment due to residual stone.     Recommend repeating BMP today. He had medications held as described below due to his hyperkalemia and MATT. Hopefully we can restart some of those if improved.  ===========  Had an episode of recurrent stones. Otherwise being followed by urology. Cr back to baseline last check  7. MATT (acute kidney injury) [facility])  Assessment & Plan:  3/9/21 - Admitted to the hospital 2/22-2/24 for MATT and hyperkalemia. Prior to presentation, he had undergone a right-sided urethral stenting about 3 weeks prior for kidney stones and was being treated for UTI with Bactrim. Was advised to go to the ER by his urologist due to renal function and elevated potassium up to 6.8. Kidney function and hyperkalemia improved on discharge. He is subsequently underwent a right laser lithotripsy and stent exchange on 3/1. Reports he had his stent removed yesterday. They are planning on further treatment due to residual stone.     Recommend repeating BMP today. He had medications held as described below due to his hyperkalemia and MATT. Hopefully we can restart some of those if improved. Benefits, risks, possible drug interactions, and side effects of all new medications were reviewed with the patient. Pt verbalized understanding. Return to clinic:  1mo for DM, fatigue  Of note, pt mentioned fatigue during his visit, no bleeding, chest pain, SOB, sleeps \"fairly well\" but wakes up a few times at night; doesn't wake up feeling refreshed but denies snoring, apnea  eval further next visit    An electronic signature was used to authenticate this note.   Brook Chase MD  [facility]  9/3/2021    Future Appointments   Date Time Provider Patti Gill   45/6/1506  7:23 AM Dhruv Ramos MD [facility] ROMEL KING      On this date 09/03/2021 I have spent 40 minutes reviewing previous notes, test results and face to face with the patient discussing the diagnosis and importance of compliance with the treatment plan as well as documenting on the day of the visit. Objective   Vitals:       Visit Vitals  /80 (BP 1 Location: Left upper arm, BP Patient Position: Sitting, BP Cuff Size: Adult)   Pulse 67   Temp 97.5 °F (36.4 °C) (Temporal)   Resp 14   Ht 6' 2\" (1.88 m)   Wt 198 lb (89.8 kg)   SpO2 97%   BMI 25.42 kg/m²        Physical Exam  Constitutional:       Appearance: Normal appearance. He is not ill-appearing. Cardiovascular:      Rate and Rhythm: Normal rate and regular rhythm. Heart sounds: No murmur heard. No friction rub. No gallop. Pulmonary:      Effort: No respiratory distress. Breath sounds: Normal breath sounds. No wheezing, rhonchi or rales. Neurological:      Mental Status: He is alert. Current Outpatient Medications   Medication Sig    pantoprazole (PROTONIX) 40 mg tablet Take 40 mg by mouth daily.  atorvastatin (LIPITOR) 20 mg tablet Take 20 mg by mouth daily.  dulaglutide (TRULICITY) 0.61 NB/1.2 mL sub-q pen 0.5 mL by SubCUTAneous route every seven (7) days for 30 days. Indications: type 2 diabetes mellitus    ALPRAZolam (XANAX) 0.25 mg tablet Take 1 Tablet by mouth two (2) times daily as needed for Anxiety or Sleep. Max Daily Amount: 0.5 mg.    SITagliptin-metFORMIN (JANUMET) 50-1,000 mg per tablet Take 1 Tablet by mouth daily (with breakfast). Indications: type 2 diabetes mellitus    propranolol LA (INDERAL LA) 80 mg SR capsule Take 1 Capsule by mouth daily. Indications: tremor    lisinopriL (PRINIVIL, ZESTRIL) 10 mg tablet Take 1 Tablet by mouth daily. Indications: high blood pressure    glimepiride (AMARYL) 2 mg tablet Take 1 Tablet by mouth daily.  Indications: type 2 diabetes mellitus    insulin glargine (LANTUS,BASAGLAR) 100 unit/mL (3 mL) inpn 30 Units by SubCUTAneous route every evening. Indications: type 2 diabetes mellitus    multivitamin (ONE A DAY) tablet Take 1 Tab by mouth daily.  cholecalciferol (VITAMIN D3) (1000 Units /25 mcg) tablet Take 1 Tab by mouth daily.  lancets (One Touch Delica) 33 gauge misc Use 1-4 times daily to monitor sugar    glucose blood VI test strips (OneTouch Verio test strips) strip Use 3-4 times daily to monitor glucose    omeprazole (PRILOSEC) 20 mg capsule Take 1 Cap by mouth daily. Indications: GERD    buPROPion XL (Wellbutrin XL) 300 mg XL tablet Take 1 Tab by mouth every morning. Indications: anxiety    sertraline (Zoloft) 50 mg tablet Take 1 Tab by mouth daily. Indications: anxiety    fenofibric acid (TRILIPIX ER) 135 mg capsule Take 1 Cap by mouth daily. No current facility-administered medications for this visit.

## 2021-09-03 NOTE — ASSESSMENT & PLAN NOTE
3/9/21 - Admitted to the hospital 2/22-2/24 for MATT and hyperkalemia. Prior to presentation, he had undergone a right-sided urethral stenting about 3 weeks prior for kidney stones and was being treated for UTI with Bactrim. Was advised to go to the ER by his urologist due to renal function and elevated potassium up to 6.8. Kidney function and hyperkalemia improved on discharge. He is subsequently underwent a right laser lithotripsy and stent exchange on 3/1. Reports he had his stent removed yesterday. They are planning on further treatment due to residual stone.     Recommend repeating BMP today. He had medications held as described below due to his hyperkalemia and MATT. Hopefully we can restart some of those if improved.  ===========  Had an episode of recurrent stones. Otherwise being followed by urology.   Cr back to baseline last check

## 2021-09-03 NOTE — ASSESSMENT & PLAN NOTE
Last note:  Uses Xanax about a couple times a week. Has been undergoing some recent stress with his medical issues and his mother was recently put into assisted living. Last fill 60 pills in October.       No medication changes advised. Recommend continuing Xanax, Wellbutrin 300 daily, and Zoloft 50 mg  =======  Well-controlled on current regimen.   Continue Xanax 0.25 mg twice a day as needed, Wellbutrin 300 mg XL daily, and Zoloft 50 mg daily

## 2021-09-03 NOTE — ASSESSMENT & PLAN NOTE
Reports being out of his Janumet and Trulicity for a while. Has also been missing doses of glargine. Reports having a lot of responsibilities and moving and forgetting medications. Has been consistent with his glimepiride. A1c 11.3%. Likely related to being out of medications for the past 1-2 months. Recommend restarting Janumet  once daily and Trulicity. However since he has been out of Trulicity for a while, recommend starting at lower dose 0.75 weekly for now. Plan to increase to 1.5 if tolerated. Counseled on being consistent with glargine-on 30 units daily. Continue glimepiride 2 mg daily. Advised monitoring glucose. Given sheet to track sugars especially week prior to next visit.   Continue lisinopril 10 mg and atorvastatin 20

## 2021-09-03 NOTE — ASSESSMENT & PLAN NOTE
3/9/21 - Working well for him. Uses when he feels like he needs it.   Last filled 60 pills in November

## 2021-09-03 NOTE — ASSESSMENT & PLAN NOTE
3/29/21 - Working well for him. Reports previously he ran out of the medications and symptoms returned.   Continue omeprazole for now although consider famotidine trial in the future

## 2021-09-03 NOTE — ASSESSMENT & PLAN NOTE
Last visit:  Given kidney stones, likely needs treatment for his sarcoid. Referred to [facility] sarcoid clinic  =====  Since last visit, has established care with sarcoid clinic at [facility]. Recently had PFTs done.   Continue to follow with sarcoid clinic

## 2021-09-08 ENCOUNTER — DOCUMENTATION ONLY (OUTPATIENT)
Dept: INTERNAL MEDICINE CLINIC | Age: 56
End: 2021-09-08

## 2021-09-08 NOTE — PROGRESS NOTES
PA completed via covermymeds-- awaiting insurance response  Key: [id number]  Status  Sent to [address] [place] [address] 50-1000MG tablets

## 2021-09-10 ENCOUNTER — DOCUMENTATION ONLY (OUTPATIENT)
Dept: INTERNAL MEDICINE CLINIC | Age: 56
End: 2021-09-10

## 2021-09-10 NOTE — PROGRESS NOTES
PA denied-  Appeal completed nurse faxed over appeal letter for approval of Janumet 50-1000mg. Dm poorly controlled while patient is on metformin.   Awaiting appeal response

## 2021-10-01 ENCOUNTER — OFFICE VISIT (OUTPATIENT)
Dept: INTERNAL MEDICINE CLINIC | Age: 56
End: 2021-10-01
Payer: COMMERCIAL

## 2021-10-01 VITALS
DIASTOLIC BLOOD PRESSURE: 90 MMHG | RESPIRATION RATE: 14 BRPM | TEMPERATURE: 97.3 F | HEIGHT: 74 IN | WEIGHT: 196 LBS | SYSTOLIC BLOOD PRESSURE: 133 MMHG | BODY MASS INDEX: 25.15 KG/M2 | OXYGEN SATURATION: 98 % | HEART RATE: 83 BPM

## 2021-10-01 DIAGNOSIS — E11.29 TYPE 2 DIABETES MELLITUS WITH MICROALBUMINURIA, WITH LONG-TERM CURRENT USE OF INSULIN (HCC): Primary | ICD-10-CM

## 2021-10-01 DIAGNOSIS — R53.83 FATIGUE, UNSPECIFIED TYPE: ICD-10-CM

## 2021-10-01 DIAGNOSIS — G62.9 NEUROPATHY: ICD-10-CM

## 2021-10-01 DIAGNOSIS — R41.3 MEMORY LOSS: ICD-10-CM

## 2021-10-01 DIAGNOSIS — Z79.4 TYPE 2 DIABETES MELLITUS WITH MICROALBUMINURIA, WITH LONG-TERM CURRENT USE OF INSULIN (HCC): Primary | ICD-10-CM

## 2021-10-01 DIAGNOSIS — R80.9 TYPE 2 DIABETES MELLITUS WITH MICROALBUMINURIA, WITH LONG-TERM CURRENT USE OF INSULIN (HCC): Primary | ICD-10-CM

## 2021-10-01 DIAGNOSIS — F33.0 MILD EPISODE OF RECURRENT MAJOR DEPRESSIVE DISORDER (HCC): ICD-10-CM

## 2021-10-01 PROCEDURE — 99214 OFFICE O/P EST MOD 30 MIN: CPT | Performed by: INTERNAL MEDICINE

## 2021-10-01 RX ORDER — METFORMIN HYDROCHLORIDE 500 MG/1
1000 TABLET, EXTENDED RELEASE ORAL
Qty: 90 TABLET | Refills: 1 | Status: SHIPPED | OUTPATIENT
Start: 2021-10-01 | End: 2021-11-02 | Stop reason: SDUPTHER

## 2021-10-01 RX ORDER — DULAGLUTIDE 1.5 MG/.5ML
1.5 INJECTION, SOLUTION SUBCUTANEOUS
Qty: 12 EACH | Refills: 1 | Status: SHIPPED | OUTPATIENT
Start: 2021-10-01 | End: 2021-11-24

## 2021-10-01 NOTE — PROGRESS NOTES
Note   Chief Complaint   DM    Vicente Olivares is a 55 y.o. male     1. Type 2 diabetes mellitus with microalbuminuria, with long-term current use of insulin ([facility])  Assessment & Plan:  Was unable to get janumet since last visit. Glucose in mid-200s. Difficulty with obtaining healthy foods on limited income. Recommend  meds - sent in metformin XR 1000mg and sitagliptan 50mg daily. Increase trulicity to 2.2UH. Continue glimeperide 2mg, glargine 30 units daily. Orders:  -     SITagliptin (JANUVIA) 50 mg tablet; Take 1 Tablet by mouth daily. Indications: type 2 diabetes mellitus, Normal, Disp-90 Tablet, R-1  -     metFORMIN ER (GLUCOPHAGE XR) 500 mg tablet; Take 2 Tablets by mouth daily (with dinner). Indications: type 2 diabetes mellitus, Normal, Disp-90 Tablet, R-1  -     dulaglutide (Trulicity) 1.5 YQ/7.8 mL sub-q pen; 0.5 mL by SubCUTAneous route every seven (7) days. Indications: type 2 diabetes mellitus, Normal, Disp-12 Each, R-1  2. Fatigue, unspecified type  Assessment & Plan:  Has noticed increased fatigue. Denies any apnea episodes,  No bleeding, chest pain, shortness of breath. Likely multifactorial.  Recommend labs to rule out easily reversible causes  Orders:  -     CBC WITH AUTOMATED DIFF; Future  -     METABOLIC PANEL, COMPREHENSIVE; Future  -     VITAMIN D, 25 HYDROXY; Future  3. Neuropathy  Assessment & Plan:  Reports noting numbness and tingling in his right leg, though that has been chronic since his back surgery. Has also started noticing some symptoms in his left thigh. Seen by neurology, thought secondary to meralgia paresthetica. Denies any claudication. monitor  Orders:  -     VITAMIN B12 & FOLATE; Future  -     TSH 3RD GENERATION; Future  -     T4, FREE; Future  -     T3 TOTAL; Future  -     SPEP AND SHOLA, SERUM; Future  4. Memory loss  Assessment & Plan:  Reports forgetting things in the middle of a sentence. Does not affect his daily activities.     Likely multifactorial - fatigue, depression. Monitor and consider further testing if needed  5. Mild episode of recurrent major depressive disorder [facility])  Assessment & Plan:  Reports noticing some worsening depression. Has a lot going on in his life right now. Denies any suicidal ideation. Pt not interested in making any medication changes at this time. Continue wellbutrin XL 300mg daily and zoloft 50mg daily       Benefits, risks, possible drug interactions, and side effects of all new medications were reviewed with the patient. Pt verbalized understanding. Return to clinic:  1 month for DM2, fatigue    An electronic signature was used to authenticate this note. Deborah Tobar MD  [facility]  10/3/2021    Future Appointments   Date Time Provider Patti Gill   77/2/3334 19:33 AM Michael Denson MD [facility] BS AMB        Objective   Vitals:       Visit Vitals  BP (!) 133/90 (BP 1 Location: Left upper arm, BP Patient Position: Sitting, BP Cuff Size: Adult)   Pulse 83   Temp 97.3 °F (36.3 °C) (Temporal)   Resp 14   Ht 6' 2\" (1.88 m)   Wt 196 lb (88.9 kg)   SpO2 98%   BMI 25.16 kg/m²        Physical Exam  Constitutional:       Appearance: Normal appearance. He is not ill-appearing. Cardiovascular:      Rate and Rhythm: Normal rate and regular rhythm. Heart sounds: No murmur heard. No friction rub. No gallop. Pulmonary:      Effort: No respiratory distress. Breath sounds: Normal breath sounds. No wheezing, rhonchi or rales. Abdominal:      General: Bowel sounds are normal. There is no distension. Palpations: Abdomen is soft. There is no mass. Tenderness: There is no abdominal tenderness. There is no guarding. Neurological:      Mental Status: He is alert. Current Outpatient Medications   Medication Sig    SITagliptin (JANUVIA) 50 mg tablet Take 1 Tablet by mouth daily.  Indications: type 2 diabetes mellitus    metFORMIN ER (GLUCOPHAGE XR) 500 mg tablet Take 2 Tablets by mouth daily (with dinner). Indications: type 2 diabetes mellitus    dulaglutide (Trulicity) 1.5 ZU/2.9 mL sub-q pen 0.5 mL by SubCUTAneous route every seven (7) days. Indications: type 2 diabetes mellitus    pantoprazole (PROTONIX) 40 mg tablet Take 40 mg by mouth daily.  atorvastatin (LIPITOR) 20 mg tablet Take 20 mg by mouth daily.  ALPRAZolam (XANAX) 0.25 mg tablet Take 1 Tablet by mouth two (2) times daily as needed for Anxiety or Sleep. Max Daily Amount: 0.5 mg.    lisinopriL (PRINIVIL, ZESTRIL) 10 mg tablet Take 1 Tablet by mouth daily. Indications: high blood pressure    glimepiride (AMARYL) 2 mg tablet Take 1 Tablet by mouth daily. Indications: type 2 diabetes mellitus    insulin glargine (LANTUS,BASAGLAR) 100 unit/mL (3 mL) inpn 30 Units by SubCUTAneous route every evening. Indications: type 2 diabetes mellitus    multivitamin (ONE A DAY) tablet Take 1 Tab by mouth daily.  cholecalciferol (VITAMIN D3) (1000 Units /25 mcg) tablet Take 1 Tab by mouth daily.  lancets (One Touch Delica) 33 gauge misc Use 1-4 times daily to monitor sugar    glucose blood VI test strips (OneTouch Verio test strips) strip Use 3-4 times daily to monitor glucose    omeprazole (PRILOSEC) 20 mg capsule Take 1 Cap by mouth daily. Indications: GERD    buPROPion XL (Wellbutrin XL) 300 mg XL tablet Take 1 Tab by mouth every morning. Indications: anxiety    fenofibric acid (TRILIPIX ER) 135 mg capsule Take 1 Cap by mouth daily.  propranolol LA (INDERAL LA) 80 mg SR capsule Take 1 Capsule by mouth daily. Indications: tremor    sertraline (Zoloft) 50 mg tablet Take 1 Tab by mouth daily. Indications: anxiety     No current facility-administered medications for this visit.

## 2021-10-02 LAB
25(OH)D3 SERPL-MCNC: 32.2 NG/ML (ref 30–100)
ALBUMIN SERPL-MCNC: 4 G/DL (ref 3.5–5)
ALBUMIN/GLOB SERPL: 1.1 {RATIO} (ref 1.1–2.2)
ALP SERPL-CCNC: 93 U/L (ref 45–117)
ALT SERPL-CCNC: 31 U/L (ref 12–78)
ANION GAP SERPL CALC-SCNC: 4 MMOL/L (ref 5–15)
AST SERPL-CCNC: 19 U/L (ref 15–37)
BASOPHILS # BLD: 0.1 K/UL (ref 0–0.1)
BASOPHILS NFR BLD: 1 % (ref 0–1)
BILIRUB SERPL-MCNC: 0.5 MG/DL (ref 0.2–1)
BUN SERPL-MCNC: 13 MG/DL (ref 6–20)
BUN/CREAT SERPL: 9 (ref 12–20)
CALCIUM SERPL-MCNC: 9.8 MG/DL (ref 8.5–10.1)
CHLORIDE SERPL-SCNC: 103 MMOL/L (ref 97–108)
CO2 SERPL-SCNC: 27 MMOL/L (ref 21–32)
CREAT SERPL-MCNC: 1.41 MG/DL (ref 0.7–1.3)
DIFFERENTIAL METHOD BLD: ABNORMAL
EOSINOPHIL # BLD: 0.1 K/UL (ref 0–0.4)
EOSINOPHIL NFR BLD: 2 % (ref 0–7)
ERYTHROCYTE [DISTWIDTH] IN BLOOD BY AUTOMATED COUNT: 13.6 % (ref 11.5–14.5)
FOLATE SERPL-MCNC: 16 NG/ML (ref 5–21)
GLOBULIN SER CALC-MCNC: 3.8 G/DL (ref 2–4)
GLUCOSE SERPL-MCNC: 340 MG/DL (ref 65–100)
HCT VFR BLD AUTO: 44.6 % (ref 36.6–50.3)
HGB BLD-MCNC: 14.1 G/DL (ref 12.1–17)
IMM GRANULOCYTES # BLD AUTO: 0.1 K/UL (ref 0–0.04)
IMM GRANULOCYTES NFR BLD AUTO: 1 % (ref 0–0.5)
LYMPHOCYTES # BLD: 1.6 K/UL (ref 0.8–3.5)
LYMPHOCYTES NFR BLD: 19 % (ref 12–49)
MCH RBC QN AUTO: 27.2 PG (ref 26–34)
MCHC RBC AUTO-ENTMCNC: 31.6 G/DL (ref 30–36.5)
MCV RBC AUTO: 86.1 FL (ref 80–99)
MONOCYTES # BLD: 0.8 K/UL (ref 0–1)
MONOCYTES NFR BLD: 10 % (ref 5–13)
NEUTS SEG # BLD: 5.9 K/UL (ref 1.8–8)
NEUTS SEG NFR BLD: 67 % (ref 32–75)
NRBC # BLD: 0 K/UL (ref 0–0.01)
NRBC BLD-RTO: 0 PER 100 WBC
PLATELET # BLD AUTO: 286 K/UL (ref 150–400)
PMV BLD AUTO: 12.5 FL (ref 8.9–12.9)
POTASSIUM SERPL-SCNC: 4.6 MMOL/L (ref 3.5–5.1)
PROT SERPL-MCNC: 7.8 G/DL (ref 6.4–8.2)
RBC # BLD AUTO: 5.18 M/UL (ref 4.1–5.7)
SODIUM SERPL-SCNC: 134 MMOL/L (ref 136–145)
T4 FREE SERPL-MCNC: 1.4 NG/DL (ref 0.8–1.5)
TSH SERPL DL<=0.05 MIU/L-ACNC: 1.32 UIU/ML (ref 0.36–3.74)
VIT B12 SERPL-MCNC: 160 PG/ML (ref 193–986)
WBC # BLD AUTO: 8.7 K/UL (ref 4.1–11.1)

## 2021-10-03 PROBLEM — R41.3 MEMORY LOSS: Status: ACTIVE | Noted: 2021-10-03

## 2021-10-03 PROBLEM — F33.0 MILD EPISODE OF RECURRENT MAJOR DEPRESSIVE DISORDER (HCC): Status: ACTIVE | Noted: 2021-10-03

## 2021-10-03 PROBLEM — G62.9 NEUROPATHY: Status: ACTIVE | Noted: 2021-10-03

## 2021-10-03 PROBLEM — R53.83 FATIGUE: Status: ACTIVE | Noted: 2021-10-03

## 2021-10-03 LAB — T3 SERPL-MCNC: 119 NG/DL (ref 71–180)

## 2021-10-03 NOTE — ASSESSMENT & PLAN NOTE
Has noticed increased fatigue. Denies any apnea episodes,  No bleeding, chest pain, shortness of breath.     Likely multifactorial.  Recommend labs to rule out easily reversible causes

## 2021-10-03 NOTE — ASSESSMENT & PLAN NOTE
Was unable to get janumet since last visit. Glucose in mid-200s. Difficulty with obtaining healthy foods on limited income. Recommend  meds - sent in metformin XR 1000mg and sitagliptan 50mg daily. Increase trulicity to 0.2NF. Continue glimeperide 2mg, glargine 30 units daily.

## 2021-10-03 NOTE — ASSESSMENT & PLAN NOTE
Reports noticing some worsening depression. Has a lot going on in his life right now. Denies any suicidal ideation. Pt not interested in making any medication changes at this time.   Continue wellbutrin XL 300mg daily and zoloft 50mg daily

## 2021-10-03 NOTE — ASSESSMENT & PLAN NOTE
Reports noting numbness and tingling in his right leg, though that has been chronic since his back surgery. Has also started noticing some symptoms in his left thigh. Seen by neurology, thought secondary to meralgia paresthetica. Denies any claudication.     monitor

## 2021-10-03 NOTE — ASSESSMENT & PLAN NOTE
Reports forgetting things in the middle of a sentence. Does not affect his daily activities. Likely multifactorial - fatigue, depression.   Monitor and consider further testing if needed

## 2021-10-05 LAB
ALBUMIN SERPL ELPH-MCNC: 3.8 G/DL (ref 2.9–4.4)
ALBUMIN/GLOB SERPL: 1.1 {RATIO} (ref 0.7–1.7)
ALPHA1 GLOB SERPL ELPH-MCNC: 0.1 G/DL (ref 0–0.4)
ALPHA2 GLOB SERPL ELPH-MCNC: 1 G/DL (ref 0.4–1)
B-GLOBULIN SERPL ELPH-MCNC: 1.3 G/DL (ref 0.7–1.3)
GAMMA GLOB SERPL ELPH-MCNC: 1.1 G/DL (ref 0.4–1.8)
GLOBULIN SER-MCNC: 3.5 G/DL (ref 2.2–3.9)
IGA SERPL-MCNC: 337 MG/DL (ref 90–386)
IGG SERPL-MCNC: 826 MG/DL (ref 603–1613)
IGM SERPL-MCNC: 521 MG/DL (ref 20–172)
INTERPRETATION SERPL IEP-IMP: ABNORMAL
M PROTEIN SERPL ELPH-MCNC: 0.4 G/DL
PROT SERPL-MCNC: 7.3 G/DL (ref 6–8.5)

## 2021-10-07 ENCOUNTER — TELEPHONE (OUTPATIENT)
Dept: INTERNAL MEDICINE CLINIC | Age: 56
End: 2021-10-07

## 2021-10-07 NOTE — TELEPHONE ENCOUNTER
Recv'd fax from patient's insurance company that Januvia 50mg tablets are not covered, but pre-authorization can be attempted. Covered alternatives include, but not limited to: Glipizide-Metformin HCL, Glipizide ER, Metformin HCL ER, Pioglitazone HCL, Metformin HCL, Glimepiride, Glipizde, Tradjenta. Initial pre-auth demographic information submitted to [facility] via CoverMyMeds. Final determination is pending review. Zeyad Estimable, Rx: [id number].

## 2021-10-14 ENCOUNTER — TELEPHONE (OUTPATIENT)
Dept: INTERNAL MEDICINE CLINIC | Age: 56
End: 2021-10-14

## 2021-10-14 DIAGNOSIS — Z79.4 TYPE 2 DIABETES MELLITUS WITH MICROALBUMINURIA, WITH LONG-TERM CURRENT USE OF INSULIN (HCC): Primary | ICD-10-CM

## 2021-10-14 DIAGNOSIS — E11.29 TYPE 2 DIABETES MELLITUS WITH MICROALBUMINURIA, WITH LONG-TERM CURRENT USE OF INSULIN (HCC): Primary | ICD-10-CM

## 2021-10-14 DIAGNOSIS — R80.9 TYPE 2 DIABETES MELLITUS WITH MICROALBUMINURIA, WITH LONG-TERM CURRENT USE OF INSULIN (HCC): Primary | ICD-10-CM

## 2021-10-14 NOTE — PROGRESS NOTES
spoke on the phone with the patient  Total T3 normal.  Vitamin D 32.2 from 15.6. TSH and free T4 normal.  B12 160. Folate normal.  Sodium 134 - normal corrected for glucose. Glucose 340. Creatinine 1.41 though close to previous. CMP otherwise normal.  Hemoglobin 14.1. CBC normal.    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   Will verify this with his hematologist Dr. Toan Bishop low - start replacement   Continue vitamin D

## 2021-11-02 ENCOUNTER — DOCUMENTATION ONLY (OUTPATIENT)
Dept: INTERNAL MEDICINE CLINIC | Age: 56
End: 2021-11-02

## 2021-11-02 ENCOUNTER — OFFICE VISIT (OUTPATIENT)
Dept: INTERNAL MEDICINE CLINIC | Age: 56
End: 2021-11-02
Payer: COMMERCIAL

## 2021-11-02 VITALS
BODY MASS INDEX: 25.8 KG/M2 | HEIGHT: 74 IN | WEIGHT: 201 LBS | TEMPERATURE: 97.2 F | DIASTOLIC BLOOD PRESSURE: 95 MMHG | SYSTOLIC BLOOD PRESSURE: 151 MMHG | HEART RATE: 76 BPM | OXYGEN SATURATION: 99 % | RESPIRATION RATE: 14 BRPM

## 2021-11-02 DIAGNOSIS — D47.2 MONOCLONAL GAMMOPATHY: ICD-10-CM

## 2021-11-02 DIAGNOSIS — Z79.4 TYPE 2 DIABETES MELLITUS WITH MICROALBUMINURIA, WITH LONG-TERM CURRENT USE OF INSULIN (HCC): ICD-10-CM

## 2021-11-02 DIAGNOSIS — L72.9 SKIN CYST: ICD-10-CM

## 2021-11-02 DIAGNOSIS — R80.9 TYPE 2 DIABETES MELLITUS WITH MICROALBUMINURIA, WITH LONG-TERM CURRENT USE OF INSULIN (HCC): ICD-10-CM

## 2021-11-02 DIAGNOSIS — E53.8 B12 DEFICIENCY: Primary | ICD-10-CM

## 2021-11-02 DIAGNOSIS — E11.29 TYPE 2 DIABETES MELLITUS WITH MICROALBUMINURIA, WITH LONG-TERM CURRENT USE OF INSULIN (HCC): ICD-10-CM

## 2021-11-02 PROBLEM — L72.0 EPIDERMAL CYST: Status: ACTIVE | Noted: 2021-11-02

## 2021-11-02 PROCEDURE — 99214 OFFICE O/P EST MOD 30 MIN: CPT | Performed by: INTERNAL MEDICINE

## 2021-11-02 PROCEDURE — 96372 THER/PROPH/DIAG INJ SC/IM: CPT | Performed by: INTERNAL MEDICINE

## 2021-11-02 RX ORDER — CYANOCOBALAMIN 1000 UG/ML
1000 INJECTION, SOLUTION INTRAMUSCULAR; SUBCUTANEOUS ONCE
Qty: 1 ML | Refills: 0
Start: 2021-11-02 | End: 2021-11-02

## 2021-11-02 RX ORDER — DULAGLUTIDE 3 MG/.5ML
3 INJECTION, SOLUTION SUBCUTANEOUS
Qty: 4 EACH | Refills: 1 | Status: SHIPPED | OUTPATIENT
Start: 2021-11-02 | End: 2021-11-30 | Stop reason: SDUPTHER

## 2021-11-02 RX ORDER — METFORMIN HYDROCHLORIDE 500 MG/1
1000 TABLET, EXTENDED RELEASE ORAL 2 TIMES DAILY
Qty: 360 TABLET | Refills: 1 | Status: SHIPPED | OUTPATIENT
Start: 2021-11-02 | End: 2022-07-25

## 2021-11-02 RX ORDER — CYANOCOBALAMIN 1000 UG/ML
1000 INJECTION, SOLUTION INTRAMUSCULAR; SUBCUTANEOUS ONCE
Status: DISCONTINUED | OUTPATIENT
Start: 2021-11-02 | End: 2021-11-02

## 2021-11-02 NOTE — PROGRESS NOTES
Note   Chief Complaint   dm2    Luis Felipe Sierra is a 55 y.o. male     1. Type 2 diabetes mellitus with microalbuminuria, with long-term current use of insulin ([address]  Assessment & Plan:   Last visit, recommended splitting Metformin and sitagliptin due to inability to get Janumet. Sitagliptin still not covered so tried linagliptin. Still not covered, PA pending at this time. We also increased his Trulicity to 1.5 mg. Tolerating well. Reports glucose still ranging high 100s up to 318 but mostly in the 200s. Increase Metformin-titrate up to 1000 XR twice daily (from 1000 daily)  Increase Trulicity to 3 mg daily. Patient took his dose of 1.5 yesterday. Recommend using another pen today to increase to 3 mg. Continue glimepiride 2 mg daily and glargine 30 units daily. He has not previously used mealtime insulin and he does not like needles  Try to get linagliptin covered  Recommend following up with Tiara Bauer for further adjustments  Orders:  -     dulaglutide (Trulicity) 3 SL/2.9 mL pnij; 3 mg by SubCUTAneous route every seven (7) days. Indications: type 2 diabetes mellitus, Normal, Disp-4 Each, R-1  -     metFORMIN ER (GLUCOPHAGE XR) 500 mg tablet; Take 2 Tablets by mouth two (2) times a day. Indications: type 2 diabetes mellitus, Normal, Disp-360 Tablet, R-1  2. B12 deficiency  Assessment & Plan:   B12 160 on labs. Possibly contributing to neuropathy and memory. Recommend B12 injections. He has agreed to every 2 weeks  3. Skin cyst  Assessment & Plan:  Patient reports occasionally noting a nodule left armpit that will come and go and resolves with warm compress. Likely cyst.  Does not appear infected at this time. Recommend continuing warm compress and monitoring  4. Monoclonal gammopathy  Assessment & Plan:  SPEP from last visit showing IgM monoclonal protein with kappa light chain. This is followed by Dr. Barbara Adamson with [facility] and they are aware of this finding.        Benefits, risks, possible drug interactions, and side effects of all new medications were reviewed with the patient. Pt verbalized understanding. Return to clinic: 2 weeks for DM2, b12 injection, BP    An electronic signature was used to authenticate this note. Raegan Alatorre MD  [facility]  11/2/2021    Future Appointments   Date Time Provider Patti Gill   81/28/1700  2:45 AM Melody Gary MD [facility]   11/16/2021  9:30 AM [facility]ando[facility]        Objective   Vitals:       Visit Vitals  BP (!) 151/95 (BP 1 Location: Left upper arm, BP Patient Position: Sitting, BP Cuff Size: Adult)   Pulse 76   Temp 97.2 °F (36.2 °C) (Temporal)   Resp 14   Ht 6' 2\" (1.88 m)   Wt 201 lb (91.2 kg)   SpO2 99%   BMI 25.81 kg/m²        Physical Exam  Constitutional:       Appearance: Normal appearance. He is not ill-appearing. Cardiovascular:      Rate and Rhythm: Normal rate and regular rhythm. Heart sounds: No murmur heard. No friction rub. No gallop. Pulmonary:      Effort: No respiratory distress. Breath sounds: Normal breath sounds. No wheezing, rhonchi or rales. Skin:     Comments: peasize subq soft nodule noted with slight overlying erythema, slightly tender to touch   Neurological:      Mental Status: He is alert. Current Outpatient Medications   Medication Sig    dulaglutide (Trulicity) 3 ZG/7.5 mL pnij 3 mg by SubCUTAneous route every seven (7) days. Indications: type 2 diabetes mellitus    metFORMIN ER (GLUCOPHAGE XR) 500 mg tablet Take 2 Tablets by mouth two (2) times a day. Indications: type 2 diabetes mellitus    dulaglutide (Trulicity) 1.5 IX/2.5 mL sub-q pen 0.5 mL by SubCUTAneous route every seven (7) days. Indications: type 2 diabetes mellitus    pantoprazole (PROTONIX) 40 mg tablet Take 40 mg by mouth daily.  atorvastatin (LIPITOR) 20 mg tablet Take 20 mg by mouth daily.     ALPRAZolam (XANAX) 0.25 mg tablet Take 1 Tablet by mouth two (2) times daily as needed for Anxiety or Sleep. Max Daily Amount: 0.5 mg.  propranolol LA (INDERAL LA) 80 mg SR capsule Take 1 Capsule by mouth daily. Indications: tremor    lisinopriL (PRINIVIL, ZESTRIL) 10 mg tablet Take 1 Tablet by mouth daily. Indications: high blood pressure    glimepiride (AMARYL) 2 mg tablet Take 1 Tablet by mouth daily. Indications: type 2 diabetes mellitus    insulin glargine (LANTUS,BASAGLAR) 100 unit/mL (3 mL) inpn 30 Units by SubCUTAneous route every evening. Indications: type 2 diabetes mellitus    cholecalciferol (VITAMIN D3) (1000 Units /25 mcg) tablet Take 1 Tab by mouth daily.  lancets (One Touch Delica) 33 gauge misc Use 1-4 times daily to monitor sugar    glucose blood VI test strips (OneTouch Verio test strips) strip Use 3-4 times daily to monitor glucose    omeprazole (PRILOSEC) 20 mg capsule Take 1 Cap by mouth daily. Indications: GERD    buPROPion XL (Wellbutrin XL) 300 mg XL tablet Take 1 Tab by mouth every morning. Indications: anxiety    sertraline (Zoloft) 50 mg tablet Take 1 Tab by mouth daily. Indications: anxiety    fenofibric acid (TRILIPIX ER) 135 mg capsule Take 1 Cap by mouth daily.  linaGLIPtin (TRADJENTA) 5 mg tablet Take 1 Tablet by mouth daily. Indications: type 2 diabetes mellitus (Patient not taking: Reported on 11/2/2021)    multivitamin (ONE A DAY) tablet Take 1 Tab by mouth daily.  (Patient not taking: Reported on 11/2/2021)     Current Facility-Administered Medications   Medication Dose Route Frequency    cyanocobalamin (VITAMIN B12) injection 1,000 mcg  1,000 mcg IntraMUSCular ONCE

## 2021-11-02 NOTE — PROGRESS NOTES
PA completed by nurse via covermymeds. PA pending insurance approval please see below for PA key.    Key: [id number] - PA Case ID: [id number]  Status  Sent to [facility]  Tradjenta 5MG tablets

## 2021-11-02 NOTE — ASSESSMENT & PLAN NOTE
Last visit, recommended splitting Metformin and sitagliptin due to inability to get Janumet. Sitagliptin still not covered so tried linagliptin. Still not covered, PA pending at this time. We also increased his Trulicity to 1.5 mg. Tolerating well. Reports glucose still ranging high 100s up to 318 but mostly in the 200s. Increase Metformin-titrate up to 1000 XR twice daily (from 1000 daily)  Increase Trulicity to 3 mg daily. Patient took his dose of 1.5 yesterday. Recommend using another pen today to increase to 3 mg. Continue glimepiride 2 mg daily and glargine 30 units daily.   He has not previously used mealtime insulin and he does not like needles  Try to get linagliptin covered  Recommend following up with Urbano Mccauley for further adjustments

## 2021-11-02 NOTE — ASSESSMENT & PLAN NOTE
Patient reports occasionally noting a nodule left armpit that will come and go and resolves with warm compress. Likely cyst.  Does not appear infected at this time.   Recommend continuing warm compress and monitoring

## 2021-11-02 NOTE — PATIENT INSTRUCTIONS
Increase trulicity to 3mg once a week  Increase metformin - take 1000mg in the morning and 500mg in the evening; if no side effects (upset stomach) after a week, then increase to 1000mg twice a day    Return in 2 weeks for an appointment with Jessa Mcmahon our pharmacist for diabetes and a b12 injection

## 2021-11-02 NOTE — ASSESSMENT & PLAN NOTE
SPEP from last visit showing IgM monoclonal protein with kappa light chain. This is followed by Dr. Juve Thompson with [facility] and they are aware of this finding.

## 2021-11-02 NOTE — ASSESSMENT & PLAN NOTE
B12 160 on labs. Possibly contributing to neuropathy and memory. Recommend B12 injections.   He has agreed to every 2 weeks

## 2021-11-03 ENCOUNTER — DOCUMENTATION ONLY (OUTPATIENT)
Dept: INTERNAL MEDICINE CLINIC | Age: 56
End: 2021-11-03

## 2021-11-03 NOTE — PROGRESS NOTES
PA approved   Approved Drug: Tradjenta  Strength: 5 mg  Quantity/Days: 90/90  Duration: 6 months  11/03/2021 - 05/03/2022          PA completed by nurse via [facility]. PA pending insurance approval please see below for PA key.    Key: [id number] - PA Case ID: [id number]  Status  Sent to [facility]  Tradjenta 5MG tablets

## 2021-11-16 ENCOUNTER — OFFICE VISIT (OUTPATIENT)
Dept: INTERNAL MEDICINE CLINIC | Age: 56
End: 2021-11-16
Payer: COMMERCIAL

## 2021-11-16 ENCOUNTER — OFFICE VISIT (OUTPATIENT)
Dept: INTERNAL MEDICINE CLINIC | Age: 56
End: 2021-11-16

## 2021-11-16 VITALS
BODY MASS INDEX: 26.05 KG/M2 | HEIGHT: 74 IN | RESPIRATION RATE: 14 BRPM | OXYGEN SATURATION: 97 % | HEART RATE: 77 BPM | WEIGHT: 203 LBS | DIASTOLIC BLOOD PRESSURE: 87 MMHG | TEMPERATURE: 97.3 F | SYSTOLIC BLOOD PRESSURE: 128 MMHG

## 2021-11-16 DIAGNOSIS — E53.8 B12 DEFICIENCY: Primary | ICD-10-CM

## 2021-11-16 DIAGNOSIS — I10 ESSENTIAL HYPERTENSION: ICD-10-CM

## 2021-11-16 DIAGNOSIS — G25.0 ESSENTIAL TREMOR: ICD-10-CM

## 2021-11-16 DIAGNOSIS — R80.9 TYPE 2 DIABETES MELLITUS WITH MICROALBUMINURIA, WITH LONG-TERM CURRENT USE OF INSULIN (HCC): Primary | ICD-10-CM

## 2021-11-16 DIAGNOSIS — Z79.4 TYPE 2 DIABETES MELLITUS WITH MICROALBUMINURIA, WITH LONG-TERM CURRENT USE OF INSULIN (HCC): Primary | ICD-10-CM

## 2021-11-16 DIAGNOSIS — E11.29 TYPE 2 DIABETES MELLITUS WITH MICROALBUMINURIA, WITH LONG-TERM CURRENT USE OF INSULIN (HCC): Primary | ICD-10-CM

## 2021-11-16 PROCEDURE — 96372 THER/PROPH/DIAG INJ SC/IM: CPT | Performed by: INTERNAL MEDICINE

## 2021-11-16 PROCEDURE — 99214 OFFICE O/P EST MOD 30 MIN: CPT | Performed by: INTERNAL MEDICINE

## 2021-11-16 RX ORDER — INSULIN PUMP SYRINGE, 3 ML
EACH MISCELLANEOUS
Qty: 1 KIT | Refills: 0 | Status: SHIPPED | OUTPATIENT
Start: 2021-11-16

## 2021-11-16 RX ORDER — CYANOCOBALAMIN 1000 UG/ML
1000 INJECTION, SOLUTION INTRAMUSCULAR; SUBCUTANEOUS ONCE
Qty: 1 ML | Refills: 0
Start: 2021-11-16 | End: 2021-11-16

## 2021-11-16 NOTE — PROGRESS NOTES
Note   Chief Complaint   b12 injection    Matteo Arthur is a 55 y.o. male     1. B12 deficiency  Assessment & Plan:  b12 injection today, planning for every 2 week  Orders:  -     VITAMIN B12 INJECTION  -     THER/PROPH/DIAG INJECTION, SUBCUT/IM  -     cyanocobalamin (Vitamin B-12) 1,000 mcg/mL injection; 1 mL by IntraMUSCular route once for 1 dose., No Print, Disp-1 mL, R-0  2. Essential tremor  Assessment & Plan:  Reports noting still some tremor, has had some increased stress. Discussed possibly increasing dose. He would like to stay at his current dose and see if symptoms improve if stress improves  3. Essential hypertension  Assessment & Plan:  Better controlled today. Continue lisinopril 10mg daily       Benefits, risks, possible drug interactions, and side effects of all new medications were reviewed with the patient. Pt verbalized understanding. Return to clinic:  2 weeks for b12 injection    An electronic signature was used to authenticate this note. Salvatore Lucero MD  [facility]  11/16/2021    No future appointments. Objective   Vitals:       Visit Vitals  /87 (BP 1 Location: Left upper arm, BP Patient Position: Sitting, BP Cuff Size: Adult)   Pulse 77   Temp 97.3 °F (36.3 °C) (Temporal)   Resp 14   Ht 6' 2\" (1.88 m)   Wt 203 lb (92.1 kg)   SpO2 97%   BMI 26.06 kg/m²        Physical Exam  Constitutional:       Appearance: Normal appearance. He is not ill-appearing. Cardiovascular:      Rate and Rhythm: Normal rate and regular rhythm. Heart sounds: No murmur heard. No friction rub. No gallop. Pulmonary:      Effort: No respiratory distress. Breath sounds: Normal breath sounds. No wheezing, rhonchi or rales. Neurological:      Mental Status: He is alert. Current Outpatient Medications   Medication Sig    cyanocobalamin (Vitamin B-12) 1,000 mcg/mL injection 1 mL by IntraMUSCular route once for 1 dose.     dulaglutide (Trulicity) 3 FC/2.7 mL pnij 3 mg by SubCUTAneous route every seven (7) days. Indications: type 2 diabetes mellitus    metFORMIN ER (GLUCOPHAGE XR) 500 mg tablet Take 2 Tablets by mouth two (2) times a day. Indications: type 2 diabetes mellitus    linaGLIPtin (TRADJENTA) 5 mg tablet Take 1 Tablet by mouth daily. Indications: type 2 diabetes mellitus (Patient not taking: Reported on 11/2/2021)    dulaglutide (Trulicity) 1.5 /1.1 mL sub-q pen 0.5 mL by SubCUTAneous route every seven (7) days. Indications: type 2 diabetes mellitus    pantoprazole (PROTONIX) 40 mg tablet Take 40 mg by mouth daily.  atorvastatin (LIPITOR) 20 mg tablet Take 20 mg by mouth daily.  ALPRAZolam (XANAX) 0.25 mg tablet Take 1 Tablet by mouth two (2) times daily as needed for Anxiety or Sleep. Max Daily Amount: 0.5 mg.  propranolol LA (INDERAL LA) 80 mg SR capsule Take 1 Capsule by mouth daily. Indications: tremor    lisinopriL (PRINIVIL, ZESTRIL) 10 mg tablet Take 1 Tablet by mouth daily. Indications: high blood pressure    glimepiride (AMARYL) 2 mg tablet Take 1 Tablet by mouth daily. Indications: type 2 diabetes mellitus    insulin glargine (LANTUS,BASAGLAR) 100 unit/mL (3 mL) inpn 30 Units by SubCUTAneous route every evening. Indications: type 2 diabetes mellitus    multivitamin (ONE A DAY) tablet Take 1 Tab by mouth daily. (Patient not taking: Reported on 11/2/2021)    cholecalciferol (VITAMIN D3) (1000 Units /25 mcg) tablet Take 1 Tab by mouth daily.  lancets (One Touch Delica) 33 gauge misc Use 1-4 times daily to monitor sugar    glucose blood VI test strips (OneTouch Verio test strips) strip Use 3-4 times daily to monitor glucose    omeprazole (PRILOSEC) 20 mg capsule Take 1 Cap by mouth daily. Indications: GERD    buPROPion XL (Wellbutrin XL) 300 mg XL tablet Take 1 Tab by mouth every morning. Indications: anxiety    sertraline (Zoloft) 50 mg tablet Take 1 Tab by mouth daily.  Indications: anxiety    fenofibric acid (TRILIPIX ER) 135 mg capsule Take 1 Cap by mouth daily. No current facility-administered medications for this visit.

## 2021-11-16 NOTE — ASSESSMENT & PLAN NOTE
Reports noting still some tremor, has had some increased stress. Discussed possibly increasing dose.   He would like to stay at his current dose and see if symptoms improve if stress improves

## 2021-11-16 NOTE — PROGRESS NOTES
After obtaining consent, and per orders of Dr. Ro Rivera , injection of B12  given by Johnathon Emanuel. Patient instructed to remain in clinic for 20 minutes afterwards, and to report any adverse reaction to me immediately.

## 2021-11-16 NOTE — PROGRESS NOTES
Pharmacist Visit for Diabetes Management & Education    S/O: Sintia Michelle is a 55 y.o. male referred by Dr. Melody Gary MD for diabetes management. History - diagnosed 8-10 years ago. Pre-diabetes then turned to diabetes. Started on metformin and then was changed to janumet. About 1.7-8 years ago trulicity was added. Now insulin was added. Has seen endocrinology - Dr. Anisa Barnett. Does not see endocrinology anymore. Current Diabetes Regimen:  Metformin ER 500mg 2 tabs in the morning  Trulicity 3mg once weekly - 2 doses of this strength  Lantus - 30 units once daily - tries to every day - 1-2 days might not get the dose in. Wife administers. Glimepiride 2mg daily     ROS:   Has had low blood sugars in the past, nothing recently. Patient denies hypoglycemic and hyperglycemic signs/symptoms, chest pain, shortness of breath, neuropathy, vision changes, and any foot changes.     Self-Monitoring Blood Glucose:  Glucometer is not working  200's/300's - both before and after eating  First thing in the morning - 250-320  Lowest - 188  Feels like the trulicity is helping some    Diet:  Pickiest eater in the world  Breakfast - 1 piece of toast with margarine; orange juice (1-2 days a week) or coke  Early afternoon - crackers - 6 pack  Dinner - chicken tenders, potatoes, roast beef, steak (filet), doesn't like any vegetables  Doesn't like cheese, Emil, pasta  Drinks - coke (reg), water      Data reviewed:  Lab Results   Component Value Date/Time    Hemoglobin A1c 6.8 (H) 02/23/2021 03:30 AM    Hemoglobin A1c 7.3 (H) 05/19/2010 04:14 PM    Hemoglobin A1c (POC) 11.3 09/03/2021 08:36 AM    Glucose 340 (H) 10/01/2021 10:50 AM    Glucose (POC) 102 (H) 03/01/2021 05:02 PM    Microalbumin/Creat ratio (mg/g creat) 120 (H) 03/09/2021 12:12 PM    Microalbumin,urine random 17.70 03/09/2021 12:12 PM    LDL, calculated 131.6 (H) 03/09/2021 12:12 PM    Creatinine (POC) 0.9 05/12/2016 06:53 AM    Creatinine 1.41 (H) 10/01/2021 10:50 AM       Diabetes Health Maintenance:  Last eye exam:      Immunizations: There is no immunization history on file for this patient. Statin - ACEI/ARB - ASA  Key CAD CHF Meds             atorvastatin (LIPITOR) 20 mg tablet Take 20 mg by mouth daily. propranolol LA (INDERAL LA) 80 mg SR capsule Take 1 Capsule by mouth daily. Indications: tremor    lisinopriL (PRINIVIL, ZESTRIL) 10 mg tablet Take 1 Tablet by mouth daily. Indications: high blood pressure    fenofibric acid (TRILIPIX ER) 135 mg capsule Take 1 Cap by mouth daily. Assessment/Plan:     1. Diabetes:  a1c not at goal <7% per ADA guidelines and patient has not been checking his blood sugars recently because his meter is broke. Discussed seeing if we could get CGM covered by insurance and will look into this. Patient is not opposed to bolus therapy if it is needed. No need to get DPP4 authorization approved as patient is already on GLP-1. Discussed importance of getting insulin dose in every day. Will follow up with patient in 2 weeks to see how things are going and then we can see what changes are warranted. Meter sent to pharmacy for patient to . Patient verbalized understanding of the information presented and all of the patients questions were answered. AVS was handed to the patient and information reviewed. Patient advised to call me or Dr. Giuseppe Cervantes MD with any additional questions or concerns. Follow-up: 11/30/2021    Notification of recommendations will be sent to Dr. Giuseppe Cervantes MD for review. Sylvia Alanis, PharmD, [facility], [place] [id number] in place:  Yes   Recommendation Provided To: Patient/Caregiver: 4 via In person   Intervention Detail: Discontinued Rx: 1, reason: Duplicate Therapy, Patient Access Assistance/Sample Provided, Refill(s) Provided and Scheduled Appointment   Intervention Accepted By: Patient/Caregiver: 4   Time Spent (min): 60

## 2021-11-21 ENCOUNTER — HOSPITAL ENCOUNTER (EMERGENCY)
Age: 56
Discharge: HOME | End: 2021-11-21
Attending: EMERGENCY MEDICINE
Payer: COMMERCIAL

## 2021-11-21 ENCOUNTER — APPOINTMENT (OUTPATIENT)
Dept: GENERAL RADIOLOGY | Age: 56
End: 2021-11-21
Attending: EMERGENCY MEDICINE
Payer: COMMERCIAL

## 2021-11-21 VITALS
HEIGHT: 74 IN | DIASTOLIC BLOOD PRESSURE: 78 MMHG | HEART RATE: 92 BPM | WEIGHT: 200 LBS | TEMPERATURE: 97.9 F | OXYGEN SATURATION: 94 % | SYSTOLIC BLOOD PRESSURE: 131 MMHG | RESPIRATION RATE: 20 BRPM | BODY MASS INDEX: 25.67 KG/M2

## 2021-11-21 DIAGNOSIS — N20.0 KIDNEY STONE: Primary | ICD-10-CM

## 2021-11-21 LAB
AMORPH CRY URNS QL MICRO: ABNORMAL
APPEARANCE UR: ABNORMAL
BACTERIA URNS QL MICRO: NEGATIVE /HPF
BILIRUB UR QL: NEGATIVE
COLOR UR: ABNORMAL
EPITH CASTS URNS QL MICRO: ABNORMAL /LPF
GLUCOSE UR STRIP.AUTO-MCNC: 100 MG/DL
HGB UR QL STRIP: ABNORMAL
KETONES UR QL STRIP.AUTO: NEGATIVE MG/DL
LEUKOCYTE ESTERASE UR QL STRIP.AUTO: NEGATIVE
NITRITE UR QL STRIP.AUTO: NEGATIVE
OTHER,OTHU: ABNORMAL
PH UR STRIP: 5.5 [PH] (ref 5–8)
PROT UR STRIP-MCNC: ABNORMAL MG/DL
RBC #/AREA URNS HPF: ABNORMAL /HPF (ref 0–5)
SP GR UR REFRACTOMETRY: >1.03 (ref 1–1.03)
UR CULT HOLD, URHOLD: NORMAL
UROBILINOGEN UR QL STRIP.AUTO: 0.2 EU/DL (ref 0.2–1)
WBC URNS QL MICRO: ABNORMAL /HPF (ref 0–4)

## 2021-11-21 PROCEDURE — 99284 EMERGENCY DEPT VISIT MOD MDM: CPT

## 2021-11-21 PROCEDURE — 81001 URINALYSIS AUTO W/SCOPE: CPT

## 2021-11-21 PROCEDURE — 74011250636 HC RX REV CODE- 250/636: Performed by: EMERGENCY MEDICINE

## 2021-11-21 PROCEDURE — 96374 THER/PROPH/DIAG INJ IV PUSH: CPT

## 2021-11-21 PROCEDURE — 74018 RADEX ABDOMEN 1 VIEW: CPT

## 2021-11-21 PROCEDURE — 96375 TX/PRO/DX INJ NEW DRUG ADDON: CPT

## 2021-11-21 RX ORDER — KETOROLAC TROMETHAMINE 30 MG/ML
30 INJECTION, SOLUTION INTRAMUSCULAR; INTRAVENOUS ONCE
Status: COMPLETED | OUTPATIENT
Start: 2021-11-21 | End: 2021-11-21

## 2021-11-21 RX ORDER — OXYCODONE AND ACETAMINOPHEN 5; 325 MG/1; MG/1
1 TABLET ORAL
Qty: 12 TABLET | Refills: 0 | Status: SHIPPED | OUTPATIENT
Start: 2021-11-21 | End: 2021-11-24

## 2021-11-21 RX ORDER — TAMSULOSIN HYDROCHLORIDE 0.4 MG/1
0.4 CAPSULE ORAL DAILY
Qty: 7 CAPSULE | Refills: 0 | Status: SHIPPED | OUTPATIENT
Start: 2021-11-21 | End: 2021-11-28

## 2021-11-21 RX ORDER — OXYCODONE AND ACETAMINOPHEN 5; 325 MG/1; MG/1
TABLET ORAL
COMMUNITY
End: 2021-11-21

## 2021-11-21 RX ORDER — ONDANSETRON 2 MG/ML
4 INJECTION INTRAMUSCULAR; INTRAVENOUS
Status: COMPLETED | OUTPATIENT
Start: 2021-11-21 | End: 2021-11-21

## 2021-11-21 RX ORDER — MORPHINE SULFATE 4 MG/ML
4 INJECTION INTRAVENOUS ONCE
Status: COMPLETED | OUTPATIENT
Start: 2021-11-21 | End: 2021-11-21

## 2021-11-21 RX ADMIN — ONDANSETRON HYDROCHLORIDE 4 MG: 2 SOLUTION INTRAMUSCULAR; INTRAVENOUS at 20:28

## 2021-11-21 RX ADMIN — SODIUM CHLORIDE 1000 ML: 9 INJECTION, SOLUTION INTRAVENOUS at 20:30

## 2021-11-21 RX ADMIN — MORPHINE SULFATE 4 MG: 4 INJECTION INTRAVENOUS at 22:20

## 2021-11-21 RX ADMIN — KETOROLAC TROMETHAMINE 30 MG: 30 INJECTION, SOLUTION INTRAMUSCULAR; INTRAVENOUS at 20:26

## 2021-11-22 NOTE — DISCHARGE INSTRUCTIONS
Please take flomax and use percocet for pain. Call Urology in the morning -  [phone]) [phone]. Return for worsening symptoms. Thank you.

## 2021-11-22 NOTE — ED PROVIDER NOTES
Is a 55-year-old male with history of nephrolithiasis, diabetes, hypertension, sarcoidosis who presents with left flank pain. Patient reports that he knows he has a 4 mm stone on his left side and can feel it moving. Pain started this afternoon, patient used Percocet at home with minimal relief. Denies any urinary symptoms, no fevers, chills, nausea, vomiting. Has had lithotripsy and stent placement earlier this year.            Past Medical History:   Diagnosis Date    Cervical stenosis of spinal canal 5/13/2016    Diabetes ([facility] [address]     Hypertension        Past Surgical History:   Procedure Laterality Date    HX CERVICAL FUSION      HX ORTHOPAEDIC      multi lumbar lami/fusions    HX TONSILLECTOMY      HX UROLOGICAL      Kidney Stones \"vacuumed\"    VT ABDOMEN SURGERY PROC UNLISTED  2013    liver biopsy         Family History:   Problem Relation Age of Onset    Dementia Mother     Heart Attack Father     Cancer Neg Hx        Social History     Socioeconomic History    Marital status:      Spouse name: Not on file    Number of children: Not on file    Years of education: Not on file    Highest education level: Not on file   Occupational History    Not on file   Tobacco Use    Smoking status: Current Some Day Smoker    Smokeless tobacco: Never Used    Tobacco comment: occl cigar 5-6x/year   Vaping Use    Vaping Use: Never used   Substance and Sexual Activity    Alcohol use: Not Currently     Comment: a few times a year    Drug use: Not Currently     Comment: hx marijuana    Sexual activity: Not on file   Other Topics Concern    Not on file   Social History Narrative    Not on file     Social Determinants of Health     Financial Resource Strain:     Difficulty of Paying Living Expenses: Not on file   Food Insecurity:     Worried About Running Out of Food in the Last Year: Not on file    Xi of Food in the Last Year: Not on file   Transportation Needs:     Lack of Transportation (Medical): Not on file    Lack of Transportation (Non-Medical): Not on file   Physical Activity:     Days of Exercise per Week: Not on file    Minutes of Exercise per Session: Not on file   Stress:     Feeling of Stress : Not on file   Social Connections:     Frequency of Communication with Friends and Family: Not on file    Frequency of Social Gatherings with Friends and Family: Not on file    Attends Bahai Services: Not on file    Active Member of [address] [place] [address] [employer] or Organizations: Not on file    Attends Club or Organization Meetings: Not on file    Marital Status: Not on file   Intimate Partner Violence:     Fear of Current or Ex-Partner: Not on file    Emotionally Abused: Not on file    Physically Abused: Not on file    Sexually Abused: Not on file   Housing Stability:     Unable to Pay for Housing in the Last Year: Not on file    Number of [place] in the Last Year: Not on file    Unstable Housing in the Last Year: Not on file         ALLERGIES: Patient has no known allergies. Review of Systems   Constitutional: Negative for chills and fever. HENT: Negative for drooling and nosebleeds. Eyes: Negative for pain and itching. Respiratory: Negative for choking and stridor. Cardiovascular: Negative for leg swelling. Gastrointestinal: Negative for abdominal pain and rectal pain. Endocrine: Negative for heat intolerance and polyphagia. Genitourinary: Positive for flank pain. Negative for difficulty urinating, dysuria, enuresis, genital sores, testicular pain and urgency. Musculoskeletal: Negative for arthralgias and joint swelling. Skin: Negative for color change. Allergic/Immunologic: Negative for immunocompromised state. Neurological: Negative for tremors and speech difficulty. Hematological: Negative for adenopathy. Psychiatric/Behavioral: Negative for dysphoric mood and sleep disturbance.        Vitals:    11/21/21 2013 11/21/21 2120 11/21/21 2203 11/21/21 2230   BP: (!) 157/94 (!) 133/93 136/77 131/78   Pulse: 78 92 92    Resp: 20 20 20    Temp:       SpO2: 96% 95% 94% 94%   Weight:       Height:                Physical Exam  Vitals and nursing note reviewed. Constitutional:       General: He is in acute distress. Appearance: He is well-developed. He is not ill-appearing, toxic-appearing or diaphoretic. HENT:      Head: Normocephalic and atraumatic. Nose: Nose normal.   Eyes:      Conjunctiva/sclera: Conjunctivae normal.   Cardiovascular:      Rate and Rhythm: Normal rate and regular rhythm. Heart sounds: Normal heart sounds. Pulmonary:      Effort: Pulmonary effort is normal. No respiratory distress. Breath sounds: Normal breath sounds. Abdominal:      General: There is no distension. Palpations: Abdomen is soft. Tenderness: There is no right CVA tenderness or left CVA tenderness. Musculoskeletal:         General: No deformity. Normal range of motion. Cervical back: Normal range of motion and neck supple. Skin:     General: Skin is warm and dry. Neurological:      Mental Status: He is alert and oriented to person, place, and time. Coordination: Coordination normal.   Psychiatric:         Behavior: Behavior normal.          [facility]  ED Course as of 11/21/21 2242   Sun Nov 21, 2021   2234 Pt reports pain in controlled. Would like to hold off on CT scan at this time. Will see his urologist in the morning. Will call in prescription for flomax and percocet. Stable for dc. [AL]      ED Course User Index  [AL] Clement King MD       Procedures    Patient's results have been reviewed with them. Patient and/or family have verbally conveyed their understanding and agreement of the patient's signs, symptoms, diagnosis, treatment and prognosis and additionally agree to follow up as recommended or return to the Emergency Room should their condition change prior to follow-up.   Discharge instructions have also been provided to the patient with some educational information regarding their diagnosis as well a list of reasons why they would want to return to the ER prior to their follow-up appointment should their condition change.

## 2021-11-22 NOTE — ED TRIAGE NOTES
Triage note:  Pt arrived with c/o left flank pain radiating to left abd area that started earlier today. Pt stated it feels like his kidney stones that he has had in the past.  Pt +nausea.

## 2021-11-22 NOTE — ED NOTES
Discharge note: The patient was discharged home in stable condition, accompanied by family member. The patient is alert and oriented, is in no respiratory distress. The patient's diagnosis, condition and treatment were explained to patient by Dr Ileana Moore and reinforced by nurse. The patient expressed understanding of discharge instructions, prescriptions, and plan of care. A discharge plan has been developed. A  was not involved in the process. Patient offered a wheelchair to ED lobby for discharge but declined at this time. Patient ambulatory to ED lobby to go home with family member.

## 2021-11-30 ENCOUNTER — OFFICE VISIT (OUTPATIENT)
Dept: INTERNAL MEDICINE CLINIC | Age: 56
End: 2021-11-30
Payer: COMMERCIAL

## 2021-11-30 ENCOUNTER — OFFICE VISIT (OUTPATIENT)
Dept: INTERNAL MEDICINE CLINIC | Age: 56
End: 2021-11-30

## 2021-11-30 VITALS
OXYGEN SATURATION: 97 % | BODY MASS INDEX: 26.1 KG/M2 | RESPIRATION RATE: 16 BRPM | TEMPERATURE: 98.7 F | DIASTOLIC BLOOD PRESSURE: 80 MMHG | SYSTOLIC BLOOD PRESSURE: 132 MMHG | WEIGHT: 203.4 LBS | HEART RATE: 77 BPM | HEIGHT: 74 IN

## 2021-11-30 DIAGNOSIS — E53.8 B12 DEFICIENCY: Primary | ICD-10-CM

## 2021-11-30 DIAGNOSIS — N20.0 KIDNEY STONES: ICD-10-CM

## 2021-11-30 DIAGNOSIS — Z79.4 TYPE 2 DIABETES MELLITUS WITH MICROALBUMINURIA, WITH LONG-TERM CURRENT USE OF INSULIN (HCC): Primary | ICD-10-CM

## 2021-11-30 DIAGNOSIS — K21.9 GASTROESOPHAGEAL REFLUX DISEASE, UNSPECIFIED WHETHER ESOPHAGITIS PRESENT: ICD-10-CM

## 2021-11-30 DIAGNOSIS — R80.9 TYPE 2 DIABETES MELLITUS WITH MICROALBUMINURIA, WITH LONG-TERM CURRENT USE OF INSULIN (HCC): Primary | ICD-10-CM

## 2021-11-30 DIAGNOSIS — E11.29 TYPE 2 DIABETES MELLITUS WITH MICROALBUMINURIA, WITH LONG-TERM CURRENT USE OF INSULIN (HCC): Primary | ICD-10-CM

## 2021-11-30 LAB — HBA1C MFR BLD HPLC: 10.1 %

## 2021-11-30 PROCEDURE — 96372 THER/PROPH/DIAG INJ SC/IM: CPT | Performed by: NURSE PRACTITIONER

## 2021-11-30 PROCEDURE — 83036 HEMOGLOBIN GLYCOSYLATED A1C: CPT

## 2021-11-30 PROCEDURE — 99214 OFFICE O/P EST MOD 30 MIN: CPT | Performed by: NURSE PRACTITIONER

## 2021-11-30 RX ORDER — DULAGLUTIDE 3 MG/.5ML
3 INJECTION, SOLUTION SUBCUTANEOUS
Qty: 4 EACH | Refills: 1 | Status: SHIPPED | OUTPATIENT
Start: 2021-11-30 | End: 2022-05-05 | Stop reason: SDUPTHER

## 2021-11-30 RX ORDER — CYANOCOBALAMIN 1000 UG/ML
1000 INJECTION, SOLUTION INTRAMUSCULAR; SUBCUTANEOUS ONCE
Qty: 1 ML | Refills: 0
Start: 2021-11-30 | End: 2021-11-30

## 2021-11-30 RX ORDER — FAMOTIDINE 20 MG/1
20 TABLET, FILM COATED ORAL
Qty: 30 TABLET | Refills: 0
Start: 2021-11-30 | End: 2022-06-24

## 2021-11-30 NOTE — PROGRESS NOTES
Pharmacy Progress Note - Diabetes Management    S/O: Mr. Sintia Michelle is a 55 y.o. male, referred by Dr. Melody Gary MD, with a PMH of HTN, GERD, T2DM, neruopathy, essential tremor, anxiety, HLD, ADHD, fatigue, memory loss, and depression, was seen today for diabetes management. Patient's last A1c was 11.3% on 9/3/21, up from 6.8% on 2/23/21. Interim update: Last seen by PharmD on 11/16 for DM f/u. Pts glucometer had stopped working, BG in the 200-300s before and after eating. Fastings 250-320, lowest at 188. Has been on Trulicity for a few years, increased dose to 3 mg at the beginning of the month. Pt reports being a picky eater, and doesn't like vegetables. Pt reports drinking OJ or coke for meals. Pts wife administers his insulin, might forget a dose 1-2 times if his wife is asleep or not around the house. Pt is hesitant to CGM, open to trying bolus treatment. Pt arrives in clinic today with BG readings from his new glucometer. Pt also reports HA and nausea at night, sometimes together, sometimes just one. Nausea is intermittent, but the HA can last through the night. Sx can occur anywhere from 1-4 times a month. Pts wife told him to check his BP during the HA, which is significantly elevated. The highest reading he remembers is around 180/130. BP readings during the day are normal. Pt reports occasionally missing propranolol dose. He also has increased tremors in his hand, causing his handwriting to worsen. Pt wants to increase tremors treatment.     Pt A1C in office today was 10.1%    Medication Adherence/Access:  - Brought in home medications including prescription/non-prescriptions:  no  - Endorses barriers to affording/accessing medications : no  - Uses a pill box/other methods to organize medications: yes  - Endorses adherence to current regimen?: yes    Current anti-hyperglycemic regimen includes:    - Trulicity 3 mg weekly  - Metformin  mg 2 tabs bid  - glimepiride 2 mg daily  - glargine u-100 30 units daily    ROS:  Today, Pt endorses:  - Symptoms of Hyperglycemia: none  - Symptoms of Hypoglycemia: headache    Self Monitoring Blood Glucose (SMBG) or CGM:  - Brought in home glucometer/blood glucose log/CGM reader today:  yes  - Checks BG: in the morning, sometimes in the day, sometimes at night   - Fasting range   - PM range 157-360    Nutrition:  - Eats 2 meals/day   - Breakfast: babcock, toast  - Dinner: Meat, potatoes and bread  - Beverage(s): OJ, coke  Pt willing to cut back on diet, less sugary drinks and bread    Diabetes Health Maintenance:  · ASCVD Risk Factors: Total cholesterol, High LDL, Blood Pressure, Diabetes and Smoking History  · ASA therapy:  none  · ACE/ARB therapy: lisinopril 1 mg daily  · Optimized statin therapy: atorvastatin 20 mg daily  · The 10-year ASCVD risk score (Mala Mei, et al., 2013) is: 42.5%    · LDL: 131.6  · Nicotine dependence: Yes      Vitals: Wt Readings from Last 3 Encounters:   11/30/21 203 lb 6.4 oz (92.3 kg)   11/21/21 200 lb (90.7 kg)   11/16/21 203 lb (92.1 kg)     BP Readings from Last 3 Encounters:   11/30/21 132/80   11/21/21 131/78   11/16/21 128/87     Pulse Readings from Last 3 Encounters:   11/30/21 77   11/21/21 92   11/16/21 77       Past Medical History:   Diagnosis Date    Cervical stenosis of spinal canal 5/13/2016    Diabetes (HCC)     Hypertension      No Known Allergies    Current Outpatient Medications   Medication Sig    dulaglutide (Trulicity) 3 JI/9.1 mL pnij 3 mg by SubCUTAneous route every seven (7) days. Indications: type 2 diabetes mellitus    cyanocobalamin (Vitamin B-12) 1,000 mcg/mL injection 1 mL by IntraMUSCular route once for 1 dose.  famotidine (PEPCID) 20 mg tablet Take 1 Tablet by mouth nightly.     Blood-Glucose Meter monitoring kit Use to check blood sugars twice daily - patient using one touch verio strips but needs new meter    metFORMIN ER (GLUCOPHAGE XR) 500 mg tablet Take 2 Tablets by mouth two (2) times a day. Indications: type 2 diabetes mellitus    atorvastatin (LIPITOR) 20 mg tablet Take 20 mg by mouth daily.  ALPRAZolam (XANAX) 0.25 mg tablet Take 1 Tablet by mouth two (2) times daily as needed for Anxiety or Sleep. Max Daily Amount: 0.5 mg.  propranolol LA (INDERAL LA) 80 mg SR capsule Take 1 Capsule by mouth daily. Indications: tremor    lisinopriL (PRINIVIL, ZESTRIL) 10 mg tablet Take 1 Tablet by mouth daily. Indications: high blood pressure    glimepiride (AMARYL) 2 mg tablet Take 1 Tablet by mouth daily. Indications: type 2 diabetes mellitus    insulin glargine (LANTUS,BASAGLAR) 100 unit/mL (3 mL) inpn 30 Units by SubCUTAneous route every evening. Indications: type 2 diabetes mellitus    cholecalciferol (VITAMIN D3) (1000 Units /25 mcg) tablet Take 1 Tab by mouth daily.  lancets (One Touch Delica) 33 gauge misc Use 1-4 times daily to monitor sugar    glucose blood VI test strips (OneTouch Verio test strips) strip Use 3-4 times daily to monitor glucose    omeprazole (PRILOSEC) 20 mg capsule Take 1 Cap by mouth daily. Indications: GERD    buPROPion XL (Wellbutrin XL) 300 mg XL tablet Take 1 Tab by mouth every morning. Indications: anxiety    sertraline (Zoloft) 50 mg tablet Take 1 Tab by mouth daily. Indications: anxiety    fenofibric acid (TRILIPIX ER) 135 mg capsule Take 1 Cap by mouth daily. No current facility-administered medications for this visit.      Lab Results   Component Value Date/Time    Sodium 134 (L) 10/01/2021 10:50 AM    Potassium 4.6 10/01/2021 10:50 AM    Chloride 103 10/01/2021 10:50 AM    CO2 27 10/01/2021 10:50 AM    Anion gap 4 (L) 10/01/2021 10:50 AM    Glucose 340 (H) 10/01/2021 10:50 AM    BUN 13 10/01/2021 10:50 AM    Creatinine 1.41 (H) 10/01/2021 10:50 AM    BUN/Creatinine ratio 9 (L) 10/01/2021 10:50 AM    GFR est AA >60 10/01/2021 10:50 AM    GFR est non-AA 52 (L) 10/01/2021 10:50 AM    Calcium 9.8 10/01/2021 10:50 AM    Bilirubin, total 0.5 10/01/2021 10:50 AM    Alk. phosphatase 93 10/01/2021 10:50 AM    Protein, total 7.3 10/01/2021 10:50 AM    Protein, total 7.8 10/01/2021 10:50 AM    Albumin 4.0 10/01/2021 10:50 AM    Globulin 3.8 10/01/2021 10:50 AM    A-G Ratio 1.1 10/01/2021 10:50 AM    ALT (SGPT) 31 10/01/2021 10:50 AM     Lab Results   Component Value Date/Time    Cholesterol, total 235 (H) 03/09/2021 12:12 PM    HDL Cholesterol 27 03/09/2021 12:12 PM    LDL, calculated 131.6 (H) 03/09/2021 12:12 PM    VLDL, calculated 76.4 03/09/2021 12:12 PM    Triglyceride 382 (H) 03/09/2021 12:12 PM    CHOL/HDL Ratio 8.7 (H) 03/09/2021 12:12 PM     Lab Results   Component Value Date/Time    WBC 8.7 10/01/2021 10:50 AM    Hemoglobin (POC) 13.6 05/12/2016 06:53 AM    HGB 14.1 10/01/2021 10:50 AM    Hematocrit (POC) 40 05/12/2016 06:53 AM    HCT 44.6 10/01/2021 10:50 AM    PLATELET 539 40/63/6154 10:50 AM    MCV 86.1 10/01/2021 10:50 AM       Lab Results   Component Value Date/Time    Microalbumin/Creat ratio (mg/g creat) 120 (H) 03/09/2021 12:12 PM    Microalbumin,urine random 17.70 03/09/2021 12:12 PM       Lab Results   Component Value Date/Time    Hemoglobin A1c 6.8 (H) 02/23/2021 03:30 AM    Hemoglobin A1c 7.3 (H) 05/19/2010 04:14 PM     Hemoglobin A1c (POC)   Date Value Ref Range Status   09/03/2021 11.3 % Final        Estimated Creatinine Clearance: 68.8 mL/min (A) (by C-G formula based on SCr of 1.41 mg/dL (H)). A/P:    1) T2DM: Per ADA guidelines, Pt's A1c is not at goal of < 7%. Pt BG is mostly elevated at night after dinner, and has not yet tried any lifestyle modifications. Given pt a sample CGM, pt is willing to adjust diet based on sensor readings. Don't want to change medication therapy at the same time to avoid hypoglycemia. Pt experiencing some nausea symptoms, is still willing to stay on Trulicity. May consider bolus insulin with dinner if lifestyle modifications insufficient.   - Pt given CGM sample in clinic  - Dietary adjustments, less sugary drinks and carbs at dinnertime  - Continue metformin 500 mg 2 tabs bid, Trulicity 3 mg weekly, glimepiride 2 mg daily, glargine 30 units daily  - f/u in 2 weeks to assess BG    2) HTN: BP is not at goal of < 130/82. Pt is currently experiencing episodes of extreme hypertension sporadically at night. This may be related to missed doses of propranolol, which can cause rebound hypertension when stopped. Would like a clearer picture of pts BP and HR before changing his medications. If BP increases are unrelated to missed doses and HR is stable, may increase dose of propranolol, as it will also treat pts tremors. - Given pt home BP log, encouraged him to check multiple times kaur around the same time. - Continue propranolol 80 mg daily, lisinopril 10 mg daily  - f/u with provider    Established patient-centered goal(s) to follow up on at the next visit:    - Monitor CGM BG readings, adjust diet based on results    Resources provided:  - FreeStyle Epifanio 2 sample sensor  - Home BP log    Medication reconciliation was completed during the visit. Medications Discontinued During This Encounter   Medication Reason    dulaglutide (Trulicity) 3 EE/0.6 mL pnij REORDER     Orders Placed This Encounter    dulaglutide (Trulicity) 3 TN/8.9 mL pnij     Sig: 3 mg by SubCUTAneous route every seven (7) days. Indications: type 2 diabetes mellitus     Dispense:  4 Each     Refill:  1       Patient verbalized understanding of the information presented and all of the patients questions were answered. AVS was handed to the patient. Patient advised to call the office with any additional questions or concerns. Notifications of recommendations will be sent to Dr. Kayley Cardoza MD for review. Patient will return to clinic in 2 week(s) for follow up. Saturnino Rodriguez, PharmD Candidate of [address] [place] [address] Ne in place:  Yes   Recommendation Provided To: Patient/Caregiver: 6 via In person   Intervention Detail: Adherence Monitorin, Device Training, Lab(s) Ordered, Patient Access Assistance/Sample Provided and Scheduled Appointment   Intervention Accepted By: Patient/Caregiver: 6   Time Spent (min): 60

## 2021-11-30 NOTE — PATIENT INSTRUCTIONS
Gastroesophageal Reflux Disease (GERD): Care Instructions  Overview     Gastroesophageal reflux disease (GERD) is the backward flow of stomach acid into the esophagus. The esophagus is the tube that leads from your throat to your stomach. A one-way valve prevents the stomach acid from backing up into this tube. But when you have GERD, this valve does not close tightly enough. This can also cause pain and swelling in your esophagus. (This is called esophagitis.)  If you have mild GERD symptoms including heartburn, you may be able to control the problem with antacids or over-the-counter medicine. You can also make lifestyle changes to help reduce your symptoms. These include changing your diet and eating habits, such as not eating late at night and losing weight. Follow-up care is a key part of your treatment and safety. Be sure to make and go to all appointments, and call your doctor if you are having problems. It's also a good idea to know your test results and keep a list of the medicines you take. How can you care for yourself at home? · Take your medicines exactly as prescribed. Call your doctor if you think you are having a problem with your medicine. · Your doctor may recommend over-the-counter medicine. For mild or occasional indigestion, antacids, such as Tums, Gaviscon, Mylanta, or Maalox, may help. Your doctor also may recommend over-the-counter acid reducers, such as famotidine (Pepcid AC), cimetidine (Tagamet HB), or omeprazole (Prilosec). Read and follow all instructions on the label. If you use these medicines often, talk with your doctor. · Change your eating habits. ? It's best to eat several small meals instead of two or three large meals. ? After you eat, wait 2 to 3 hours before you lie down. ? Chocolate, mint, and alcohol can make GERD worse. ? Spicy foods, foods that have a lot of acid (like tomatoes and oranges), and coffee can make GERD symptoms worse in some people.  If your symptoms are worse after you eat a certain food, you may want to stop eating that food to see if your symptoms get better. · Do not smoke or chew tobacco. Smoking can make GERD worse. If you need help quitting, talk to your doctor about stop-smoking programs and medicines. These can increase your chances of quitting for good. · If you have GERD symptoms at night, raise the head of your bed 6 to 8 inches by putting the frame on blocks or placing a foam wedge under the head of your mattress. (Adding extra pillows does not work.)  · Do not wear tight clothing around your middle. · Lose weight if you need to. Losing just 5 to 10 pounds can help. When should you call for help? Call your doctor now or seek immediate medical care if:    · You have new or different belly pain.     · Your stools are black and tarlike or have streaks of blood. Watch closely for changes in your health, and be sure to contact your doctor if:    · Your symptoms have not improved after 2 days.     · Food seems to catch in your throat or chest.   Where can you learn more? Go to http://www.gray.com/  Enter Z592 in the search box to learn more about \"Gastroesophageal Reflux Disease (GERD): Care Instructions. \"  Current as of: February 10, 2021               Content Version: 13.0  © 2006-2021 Healthwise[employer]. Care instructions adapted under license by [employer] (which disclaims liability or warranty for this information). If you have questions about a medical condition or this instruction, always ask your healthcare professional. [place] [address] any warranty or liability for your use of this information.

## 2021-11-30 NOTE — PROGRESS NOTES
Sharyn Dash (: 1965) is a 55 y.o. male, established patient, here for evaluation of the following chief complaint(s):  Injection B12       ASSESSMENT/PLAN:  Below is the assessment and plan developed based on review of pertinent history, physical exam, labs, studies, and medications. 1. B12 deficiency -- B12 injection given in office; follow up in 2 weeks with Dr Dior Centeno for next injection  -     VITAMIN B12 INJECTION  -     THER/PROPH/DIAG INJECTION, SUBCUT/IM  -     cyanocobalamin (Vitamin B-12) 1,000 mcg/mL injection; 1 mL by IntraMUSCular route once for 1 dose., No Print, Disp-1 mL, R-0    2. Kidney stones -- recently seen in [facility] ED for kidney stones and reports passing 7 stones over Thanksgiving week; has not contacted [facility] yet for follow up appointment. 3. Gastroesophageal reflux disease, unspecified whether esophagitis present -- having increased belching at night; add on Pepcid 20 mg at bedtime and continue with Omeprazole 20 mg in am.  -     famotidine (PEPCID) 20 mg tablet; Take 1 Tablet by mouth nightly., No Print, Disp-30 Tablet, R-0      Follow up with PCP in 2 weeks. SUBJECTIVE/OBJECTIVE:  HPI    Patient of Dr Dior Centeno who presents for Vitamin B12 injection. Has been tolerating well. Reports he was recently seen in ED for flank pain and diagnosed with kidney stones; has passed multiple stones over the past week. Is seen by [facility]. Having increased belching at night for the past 2 weeks. Takes Omeprazole 20 mg in am but has been awakened at night due to belching.       Patient Active Problem List   Diagnosis Code    Cervical stenosis of spinal canal M48.02    Sarcoidosis D86.9    Acute hyperkalemia E87.5    Recurrent nephrolithiasis N20.0    Type 2 diabetes mellitus with microalbuminuria, with long-term current use of insulin ([facility] [address] E11.29, R80.9, Z79.4    Essential tremor G25.0    Anxiety F41.9    Hyperlipidemia, unspecified hyperlipidemia type E78.5    Attention deficit disorder, unspecified hyperactivity presence F98.8    Gastroesophageal reflux disease without esophagitis K21.9    Essential hypertension I10    Neuropathy G62.9    Fatigue R53.83    Memory loss R41.3    Mild episode of recurrent major depressive disorder (HCC) F33.0    Skin cyst L72.9    B12 deficiency E53.8    Monoclonal gammopathy D47.2     Past Surgical History:   Procedure Laterality Date    HX CERVICAL FUSION      HX ORTHOPAEDIC      multi lumbar lami/fusions    HX TONSILLECTOMY      HX UROLOGICAL      Kidney Stones \"vacuumed\"    NH ABDOMEN SURGERY PROC UNLISTED  2013    liver biopsy     Social History     Socioeconomic History    Marital status:      Spouse name: Not on file    Number of children: Not on file    Years of education: Not on file    Highest education level: Not on file   Occupational History    Not on file   Tobacco Use    Smoking status: Current Some Day Smoker    Smokeless tobacco: Never Used    Tobacco comment: occl cigar 5-6x/year   Vaping Use    Vaping Use: Never used   Substance and Sexual Activity    Alcohol use: Not Currently     Comment: a few times a year    Drug use: Not Currently     Comment: hx marijuana    Sexual activity: Not on file   Other Topics Concern    Not on file   Social History Narrative    Not on file     Social Determinants of Health     Financial Resource Strain:     Difficulty of Paying Living Expenses: Not on file   Food Insecurity:     Worried About Running Out of Food in the Last Year: Not on file    Xi of Food in the Last Year: Not on file   Transportation Needs:     Lack of Transportation (Medical): Not on file    Lack of Transportation (Non-Medical):  Not on file   Physical Activity:     Days of Exercise per Week: Not on file    Minutes of Exercise per Session: Not on file   Stress:     Feeling of Stress : Not on file   Social Connections:     Frequency of Communication with Friends and Family: Not on file    Frequency of Social Gatherings with Friends and Family: Not on file    Attends Church Services: Not on file    Active Member of Clubs or Organizations: Not on file    Attends Club or Organization Meetings: Not on file    Marital Status: Not on file   Intimate Partner Violence:     Fear of Current or Ex-Partner: Not on file    Emotionally Abused: Not on file    Physically Abused: Not on file    Sexually Abused: Not on file   Housing Stability:     Unable to Pay for Housing in the Last Year: Not on file    Number of [place] in the Last Year: Not on file    Unstable Housing in the Last Year: Not on file     Family History   Problem Relation Age of Onset    Dementia Mother     Heart Attack Father     Cancer Neg Hx      Current Outpatient Medications   Medication Sig    dulaglutide (Trulicity) 3 ENA/8.9 mL pnij 3 mg by SubCUTAneous route every seven (7) days. Indications: type 2 diabetes mellitus    cyanocobalamin (Vitamin B-12) 1,000 mcg/mL injection 1 mL by IntraMUSCular route once for 1 dose.  famotidine (PEPCID) 20 mg tablet Take 1 Tablet by mouth nightly.  Blood-Glucose Meter monitoring kit Use to check blood sugars twice daily - patient using one touch verio strips but needs new meter    metFORMIN ER (GLUCOPHAGE XR) 500 mg tablet Take 2 Tablets by mouth two (2) times a day. Indications: type 2 diabetes mellitus    atorvastatin (LIPITOR) 20 mg tablet Take 20 mg by mouth daily.  ALPRAZolam (XANAX) 0.25 mg tablet Take 1 Tablet by mouth two (2) times daily as needed for Anxiety or Sleep. Max Daily Amount: 0.5 mg.  propranolol LA (INDERAL LA) 80 mg SR capsule Take 1 Capsule by mouth daily. Indications: tremor    lisinopriL (PRINIVIL, ZESTRIL) 10 mg tablet Take 1 Tablet by mouth daily. Indications: high blood pressure    glimepiride (AMARYL) 2 mg tablet Take 1 Tablet by mouth daily.  Indications: type 2 diabetes mellitus    insulin glargine (LANTUS,BASAGLAR) 100 unit/mL (3 mL) inpn 30 Units by SubCUTAneous route every evening. Indications: type 2 diabetes mellitus    cholecalciferol (VITAMIN D3) (1000 Units /25 mcg) tablet Take 1 Tab by mouth daily.  lancets (One Touch Delica) 33 gauge misc Use 1-4 times daily to monitor sugar    glucose blood VI test strips (OneTouch Verio test strips) strip Use 3-4 times daily to monitor glucose    omeprazole (PRILOSEC) 20 mg capsule Take 1 Cap by mouth daily. Indications: GERD    buPROPion XL (Wellbutrin XL) 300 mg XL tablet Take 1 Tab by mouth every morning. Indications: anxiety    sertraline (Zoloft) 50 mg tablet Take 1 Tab by mouth daily. Indications: anxiety    fenofibric acid (TRILIPIX ER) 135 mg capsule Take 1 Cap by mouth daily. No current facility-administered medications for this visit. No Known Allergies    There is no immunization history on file for this patient. Review of Systems   Constitutional: Positive for fatigue. Negative for chills and fever. Respiratory: Negative for cough and shortness of breath. Cardiovascular: Negative for chest pain. Gastrointestinal: Negative for nausea and vomiting. Neurological: Negative for light-headedness and headaches. /80 (BP 1 Location: Left upper arm, BP Patient Position: Sitting, BP Cuff Size: Adult) Comment: manual cuff  Pulse 77   Temp 98.7 °F (37.1 °C) (Oral)   Resp 16   Ht 6' 2\" (1.88 m)   Wt 203 lb 6.4 oz (92.3 kg)   SpO2 97%   BMI 26.12 kg/m²   Physical Exam  Vitals and nursing note reviewed. Constitutional:       Appearance: Normal appearance. HENT:      Head: Normocephalic and atraumatic. Cardiovascular:      Rate and Rhythm: Normal rate and regular rhythm. Pulmonary:      Effort: Pulmonary effort is normal.      Breath sounds: Normal breath sounds. No wheezing or rhonchi. Skin:     General: Skin is warm and dry. Neurological:      Mental Status: He is alert.    Psychiatric:         Mood and Affect: Mood normal.         Behavior: Behavior normal.           On this date 11/30/2021 I have spent 30 minutes reviewing previous notes, test results and face to face with the patient discussing the diagnosis and importance of compliance with the treatment plan as well as documenting on the day of the visit. An electronic signature was used to authenticate this note.   -- Mabeline Closs, NP

## 2021-12-01 VITALS — BODY MASS INDEX: 26.35 KG/M2 | WEIGHT: 205.2 LBS

## 2021-12-14 ENCOUNTER — OFFICE VISIT (OUTPATIENT)
Dept: INTERNAL MEDICINE CLINIC | Age: 56
End: 2021-12-14

## 2021-12-14 ENCOUNTER — OFFICE VISIT (OUTPATIENT)
Dept: INTERNAL MEDICINE CLINIC | Age: 56
End: 2021-12-14
Payer: COMMERCIAL

## 2021-12-14 VITALS
WEIGHT: 199 LBS | OXYGEN SATURATION: 97 % | DIASTOLIC BLOOD PRESSURE: 98 MMHG | RESPIRATION RATE: 16 BRPM | HEART RATE: 75 BPM | SYSTOLIC BLOOD PRESSURE: 157 MMHG | BODY MASS INDEX: 25.54 KG/M2 | TEMPERATURE: 97.7 F | HEIGHT: 74 IN

## 2021-12-14 DIAGNOSIS — Z79.4 TYPE 2 DIABETES MELLITUS WITH MICROALBUMINURIA, WITH LONG-TERM CURRENT USE OF INSULIN (HCC): Primary | ICD-10-CM

## 2021-12-14 DIAGNOSIS — R80.9 TYPE 2 DIABETES MELLITUS WITH MICROALBUMINURIA, WITH LONG-TERM CURRENT USE OF INSULIN (HCC): Primary | ICD-10-CM

## 2021-12-14 DIAGNOSIS — E53.8 B12 DEFICIENCY: Primary | ICD-10-CM

## 2021-12-14 DIAGNOSIS — E11.29 TYPE 2 DIABETES MELLITUS WITH MICROALBUMINURIA, WITH LONG-TERM CURRENT USE OF INSULIN (HCC): Primary | ICD-10-CM

## 2021-12-14 PROCEDURE — 96372 THER/PROPH/DIAG INJ SC/IM: CPT | Performed by: INTERNAL MEDICINE

## 2021-12-14 RX ORDER — CYANOCOBALAMIN 1000 UG/ML
1000 INJECTION, SOLUTION INTRAMUSCULAR; SUBCUTANEOUS
Status: COMPLETED | OUTPATIENT
Start: 2021-12-14 | End: 2021-12-14

## 2021-12-14 RX ADMIN — CYANOCOBALAMIN 1000 MCG: 1000 INJECTION, SOLUTION INTRAMUSCULAR; SUBCUTANEOUS at 11:36

## 2021-12-14 NOTE — PROGRESS NOTES
Pharmacist Visit for Diabetes Management & Education    S/O: Ashley Rosenthal is a 55 y.o. male referred by Dr. Ajay Lawson MD for diabetes management. Patient is here to have new sensor applied. Previous sensor read error and never started working. He got this sensor from the company directly. States that he's been off track the past 2 weeks with blood sugars and blood pressures because he's been dealing with kidney stones. Headaches at night have resolved for now. Stress of double house closings coming up. Current Diabetes Regimen:  Trulicity 3mg once weekly  Metformin ER 500mg 2 twice daily  Glimepiride 2mg once daily  Lantus 30 units once daily    ROS:   Patient denies hypoglycemic and hyperglycemic signs/symptoms, chest pain, shortness of breath, neuropathy, vision changes, and any foot changes. Self-Monitoring Blood Glucose:  Patient not checking  Epifanio applied today    Data reviewed:  Lab Results   Component Value Date/Time    Hemoglobin A1c 6.8 (H) 02/23/2021 03:30 AM    Hemoglobin A1c 7.3 (H) 05/19/2010 04:14 PM    Hemoglobin A1c (POC) 10.1 11/30/2021 08:44 AM    Hemoglobin A1c (POC) 11.3 09/03/2021 08:36 AM    Glucose 340 (H) 10/01/2021 10:50 AM    Glucose (POC) 102 (H) 03/01/2021 05:02 PM    Microalbumin/Creat ratio (mg/g creat) 120 (H) 03/09/2021 12:12 PM    Microalbumin,urine random 17.70 03/09/2021 12:12 PM    LDL, calculated 131.6 (H) 03/09/2021 12:12 PM    Creatinine (POC) 0.9 05/12/2016 06:53 AM    Creatinine 1.41 (H) 10/01/2021 10:50 AM       Diabetes Health Maintenance:    Immunizations: There is no immunization history on file for this patient. Statin - ACEI/ARB - ASA  Key CAD CHF Meds             atorvastatin (LIPITOR) 20 mg tablet Take 20 mg by mouth daily. propranolol LA (INDERAL LA) 80 mg SR capsule Take 1 Capsule by mouth daily. Indications: tremor    lisinopriL (PRINIVIL, ZESTRIL) 10 mg tablet Take 1 Tablet by mouth daily.  Indications: high blood pressure fenofibric acid (TRILIPIX ER) 135 mg capsule Take 1 Cap by mouth daily. Assessment/Plan:     1. Diabetes:    a1c not at goal <7% per ADA guidelines and unsure where blood sugars are running as patient has not been checking blood sugars. Applied new sensor today. No changes made to meds. Follow up in ~3 weeks. Connected patient to practice for [facility]    Patient verbalized understanding of the information presented and all of the patients questions were answered. AVS was handed to the patient and information reviewed. Patient advised to call me or Dr. Guillermina Daugherty MD with any additional questions or concerns. Follow-up: 1/6/2022    Notification of recommendations will be sent to Dr. Guillermina Daugherty MD for review. Matt Arrieta, PharmD, [facility], [place] [address] in place:  Yes   Recommendation Provided To: Patient/Caregiver: 3 via In person   Intervention Detail: Device Training, Patient Access Assistance/Sample Provided and Scheduled Appointment   Intervention Accepted By: Patient/Caregiver: 3   Time Spent (min): 45

## 2022-01-06 ENCOUNTER — OFFICE VISIT (OUTPATIENT)
Dept: INTERNAL MEDICINE CLINIC | Age: 57
End: 2022-01-06
Payer: COMMERCIAL

## 2022-01-06 ENCOUNTER — OFFICE VISIT (OUTPATIENT)
Dept: INTERNAL MEDICINE CLINIC | Age: 57
End: 2022-01-06

## 2022-01-06 VITALS
WEIGHT: 201 LBS | RESPIRATION RATE: 14 BRPM | SYSTOLIC BLOOD PRESSURE: 155 MMHG | TEMPERATURE: 97.1 F | HEART RATE: 68 BPM | DIASTOLIC BLOOD PRESSURE: 110 MMHG | OXYGEN SATURATION: 98 % | HEIGHT: 74 IN | BODY MASS INDEX: 25.8 KG/M2

## 2022-01-06 DIAGNOSIS — Z79.4 TYPE 2 DIABETES MELLITUS WITH MICROALBUMINURIA, WITH LONG-TERM CURRENT USE OF INSULIN (HCC): Primary | ICD-10-CM

## 2022-01-06 DIAGNOSIS — E53.8 B12 DEFICIENCY: Primary | ICD-10-CM

## 2022-01-06 DIAGNOSIS — R80.9 TYPE 2 DIABETES MELLITUS WITH MICROALBUMINURIA, WITH LONG-TERM CURRENT USE OF INSULIN (HCC): Primary | ICD-10-CM

## 2022-01-06 DIAGNOSIS — E11.29 TYPE 2 DIABETES MELLITUS WITH MICROALBUMINURIA, WITH LONG-TERM CURRENT USE OF INSULIN (HCC): Primary | ICD-10-CM

## 2022-01-06 PROCEDURE — 96372 THER/PROPH/DIAG INJ SC/IM: CPT | Performed by: INTERNAL MEDICINE

## 2022-01-06 RX ORDER — CYANOCOBALAMIN 1000 UG/ML
1000 INJECTION, SOLUTION INTRAMUSCULAR; SUBCUTANEOUS
Status: DISCONTINUED | OUTPATIENT
Start: 2022-01-07 | End: 2022-01-06 | Stop reason: SDUPTHER

## 2022-01-06 RX ORDER — CYANOCOBALAMIN 1000 UG/ML
1000 INJECTION, SOLUTION INTRAMUSCULAR; SUBCUTANEOUS ONCE
Status: COMPLETED | OUTPATIENT
Start: 2022-01-06 | End: 2022-01-06

## 2022-01-06 RX ADMIN — CYANOCOBALAMIN 1000 MCG: 1000 INJECTION, SOLUTION INTRAMUSCULAR; SUBCUTANEOUS at 11:49

## 2022-01-06 NOTE — PATIENT INSTRUCTIONS
1) Decrease Lantus to 24 units once daily    2) Decrease Glimepiride 2mg to 1/2 tablet once daily    3) Increase Lisinopril 10mg 2 tabs daily for 1 week and check in with blood pressure readings to see if further increase is needed. Plan to transition to [address] [place] [address] if approved by insurance.

## 2022-01-06 NOTE — PROGRESS NOTES
After obtaining consent, and per orders of Dr.Simpliciano vasquez, injection of B12  given by Rula Streeter. Patient instructed to remain in clinic for 20 minutes afterwards, and to report any adverse reaction to me immediately.

## 2022-01-06 NOTE — PROGRESS NOTES
Pharmacist Visit for Diabetes Management & Education    S/O: Princess Beck is a 55 y.o. male referred by Dr. Jaky Lyman MD for diabetes management. Patient is here for a follow up appointment. He's complaining of some acid reflux and belching - thinks it likely correlates to timing of when he started trulicity. He was seen earlier for his B12 injection where it was noted that his BP was running pretty high. He has also been checking at home using a wrist cuff. Home BP readings 173/109, 151/105, 190/129, 145/123, 145/103, 176/120, 190/129, 160/119  Pulse: 76, 74, 80, 90, 93, 77, 80, 76    Current BP meds:  Lisinopril 10mg once daily  Propranolol ER 80mg once daily - takes for tremors    Current Diabetes Regimen:  Trulicity 3mg once weekly  Metformin ER 500mg 2 twice daily  Glimepiride 2mg once daily  Lantus 30 units once daily    ROS:   Patient has had some lower blood sugars both early morning and later in the day. He denies hyperglycemic signs/symptoms, chest pain, shortness of breath, neuropathy, vision changes, and any foot changes. Self-Monitoring Blood Glucose:          Data reviewed:  Lab Results   Component Value Date/Time    Hemoglobin A1c 6.8 (H) 02/23/2021 03:30 AM    Hemoglobin A1c 7.3 (H) 05/19/2010 04:14 PM    Hemoglobin A1c (POC) 10.1 11/30/2021 08:44 AM    Hemoglobin A1c (POC) 11.3 09/03/2021 08:36 AM    Glucose 340 (H) 10/01/2021 10:50 AM    Glucose (POC) 102 (H) 03/01/2021 05:02 PM    Microalbumin/Creat ratio (mg/g creat) 120 (H) 03/09/2021 12:12 PM    Microalbumin,urine random 17.70 03/09/2021 12:12 PM    LDL, calculated 131.6 (H) 03/09/2021 12:12 PM    Creatinine (POC) 0.9 05/12/2016 06:53 AM    Creatinine 1.41 (H) 10/01/2021 10:50 AM       Diabetes Health Maintenance:    Immunizations: There is no immunization history on file for this patient. Statin - ACEI/ARB - ASA  Key CAD CHF Meds             atorvastatin (LIPITOR) 20 mg tablet Take 20 mg by mouth daily. propranolol LA (INDERAL LA) 80 mg SR capsule Take 1 Capsule by mouth daily. Indications: tremor    lisinopriL (PRINIVIL, ZESTRIL) 10 mg tablet Take 1 Tablet by mouth daily. Indications: high blood pressure    fenofibric acid (TRILIPIX ER) 135 mg capsule Take 1 Cap by mouth daily. Assessment/Plan:     1. Diabetes:  a1c not at goal <7% per ADA guidelines however is not indicative of current blood sugar control. Patient is having some hypoglycemia so will focus on this first by decreasing lantus to 24 units once daily and decreasing glimepiride to 1/2 tablet daily. Will plan to transition patient to ozempic from [facility] to see if this helps with side effects. 2. Hypertension: BP elevated and has been on home readings as well. Discussed with PCP - will increase Lisinopril to 20mg once daily. Since patient coming in in 1 week will hold off on further increase until he comes in and can see if additional therapy is warranted. He also plans to discuss tremor med with PCP which could potentially help some with BP but would have to watch pulse. Patient verbalized understanding of the information presented and all of the patients questions were answered. AVS was handed to the patient and information reviewed. Patient advised to call me or Dr. Carleen Gilliland MD with any additional questions or concerns. Follow-up: 1/13/2022   Notification of recommendations will be sent to Dr. Carleen Gilliland MD for review. Sheila Wheeler, PharmD, [facility], [facility] [address] in place:  Yes   Recommendation Provided To: Patient/Caregiver: 4 via In person   Intervention Detail: Dose Adjustment: 3, reason: Therapy De-escalation and Therapy Optimization and Scheduled Appointment   Intervention Accepted By: Patient/Caregiver: 4   Time Spent (min): 45

## 2022-01-12 VITALS — DIASTOLIC BLOOD PRESSURE: 92 MMHG | HEART RATE: 70 BPM | SYSTOLIC BLOOD PRESSURE: 160 MMHG

## 2022-01-13 ENCOUNTER — OFFICE VISIT (OUTPATIENT)
Dept: INTERNAL MEDICINE CLINIC | Age: 57
End: 2022-01-13

## 2022-01-13 ENCOUNTER — OFFICE VISIT (OUTPATIENT)
Dept: INTERNAL MEDICINE CLINIC | Age: 57
End: 2022-01-13
Payer: COMMERCIAL

## 2022-01-13 VITALS
SYSTOLIC BLOOD PRESSURE: 125 MMHG | OXYGEN SATURATION: 97 % | HEIGHT: 74 IN | RESPIRATION RATE: 14 BRPM | BODY MASS INDEX: 25.67 KG/M2 | HEART RATE: 99 BPM | TEMPERATURE: 97.3 F | DIASTOLIC BLOOD PRESSURE: 89 MMHG | WEIGHT: 200 LBS

## 2022-01-13 DIAGNOSIS — Z79.4 TYPE 2 DIABETES MELLITUS WITH MICROALBUMINURIA, WITH LONG-TERM CURRENT USE OF INSULIN (HCC): ICD-10-CM

## 2022-01-13 DIAGNOSIS — R80.9 TYPE 2 DIABETES MELLITUS WITH MICROALBUMINURIA, WITH LONG-TERM CURRENT USE OF INSULIN (HCC): Primary | ICD-10-CM

## 2022-01-13 DIAGNOSIS — E11.29 TYPE 2 DIABETES MELLITUS WITH MICROALBUMINURIA, WITH LONG-TERM CURRENT USE OF INSULIN (HCC): ICD-10-CM

## 2022-01-13 DIAGNOSIS — E11.29 TYPE 2 DIABETES MELLITUS WITH MICROALBUMINURIA, WITH LONG-TERM CURRENT USE OF INSULIN (HCC): Primary | ICD-10-CM

## 2022-01-13 DIAGNOSIS — E87.1 HYPONATREMIA: ICD-10-CM

## 2022-01-13 DIAGNOSIS — Z79.4 TYPE 2 DIABETES MELLITUS WITH MICROALBUMINURIA, WITH LONG-TERM CURRENT USE OF INSULIN (HCC): Primary | ICD-10-CM

## 2022-01-13 DIAGNOSIS — E53.8 B12 DEFICIENCY: ICD-10-CM

## 2022-01-13 DIAGNOSIS — F41.9 ANXIETY: ICD-10-CM

## 2022-01-13 DIAGNOSIS — R80.9 TYPE 2 DIABETES MELLITUS WITH MICROALBUMINURIA, WITH LONG-TERM CURRENT USE OF INSULIN (HCC): ICD-10-CM

## 2022-01-13 DIAGNOSIS — I10 ESSENTIAL HYPERTENSION: ICD-10-CM

## 2022-01-13 DIAGNOSIS — G25.0 ESSENTIAL TREMOR: Primary | ICD-10-CM

## 2022-01-13 PROCEDURE — 99214 OFFICE O/P EST MOD 30 MIN: CPT | Performed by: INTERNAL MEDICINE

## 2022-01-13 RX ORDER — PRIMIDONE 50 MG/1
TABLET ORAL
Qty: 120 TABLET | Refills: 0 | Status: SHIPPED | OUTPATIENT
Start: 2022-01-13 | End: 2022-04-06 | Stop reason: DRUGHIGH

## 2022-01-13 RX ORDER — ALPRAZOLAM 0.25 MG/1
0.25 TABLET ORAL
Qty: 60 TABLET | Refills: 1 | Status: SHIPPED | OUTPATIENT
Start: 2022-01-13 | End: 2022-08-08

## 2022-01-13 RX ORDER — FLASH GLUCOSE SENSOR
KIT MISCELLANEOUS
Qty: 1 KIT | Refills: 0 | Status: SHIPPED | OUTPATIENT
Start: 2022-01-13

## 2022-01-13 RX ORDER — PRIMIDONE 250 MG/1
250 TABLET ORAL DAILY
Qty: 30 TABLET | Refills: 1 | Status: SHIPPED | OUTPATIENT
Start: 2022-02-01 | End: 2022-04-06 | Stop reason: DRUGHIGH

## 2022-01-13 NOTE — ASSESSMENT & PLAN NOTE
Has been going through a lot of stress over the past year  Continue current medications, no changes recommended

## 2022-01-13 NOTE — ASSESSMENT & PLAN NOTE
Following with Adriana Adames for medication adjustments.   They have made some changes due to hypoglycemia overnight

## 2022-01-13 NOTE — ASSESSMENT & PLAN NOTE
Worsening  Initially came to me on propranolol 60 mg and we increased it to 80. 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 Continue propranolol 80 mg.

## 2022-01-13 NOTE — ASSESSMENT & PLAN NOTE
Has been elevated over the past few weeks. Lisinopril increased last week to 20. Blood pressure looks better today. He has not been checking his blood pressure at home.   His wrist cuff runs higher than ours by 10-15 pts based on previous check  Continue lisinopril 20 mg daily

## 2022-01-13 NOTE — PROGRESS NOTES
Note   Chief Complaint   Tremor    Jenny Poole is a 55 y.o. male     1. Essential tremor  Assessment & Plan:   Worsening  Initially came to me on propranolol 60 mg and we increased it to 80. 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 Continue propranolol 80 mg. Orders:  -     primidone (MYSOLINE) 50 mg tablet; Take 1 Tablet by mouth daily for 3 days, THEN 2 Tablets daily for 3 days, THEN 3 Tablets daily for 3 days, THEN 4 Tablets daily for 3 days, THEN 5 Tablets daily for 18 days. , Normal, Disp-120 Tablet, R-0  -     primidone (MYSOLINE) 250 mg tablet; Take 1 Tablet by mouth daily. Indications: essential tremor, Normal, Disp-30 Tablet, R-1  -     REFERRAL TO NEUROLOGY  2. Anxiety  Assessment & Plan:  Has been going through a lot of stress over the past year  Continue current medications, no changes recommended  Orders:  -     ALPRAZolam (XANAX) 0.25 mg tablet; Take 1 Tablet by mouth two (2) times daily as needed for Anxiety or Sleep. Max Daily Amount: 0.5 mg., Normal, Disp-60 Tablet, R-1  3. B12 deficiency  Assessment & Plan:  Check today then consider further treatment  Orders:  -     VITAMIN B12; Future  4. Essential hypertension  Assessment & Plan:  Has been elevated over the past few weeks. Lisinopril increased last week to 20. Blood pressure looks better today. He has not been checking his blood pressure at home. His wrist cuff runs higher than ours by 10-15 pts based on previous check  Continue lisinopril 20 mg daily  Orders:  -     METABOLIC PANEL, BASIC; Future  5. Type 2 diabetes mellitus with microalbuminuria, with long-term current use of insulin ([facility])  Assessment & Plan:  Following with [employer] for medication adjustments.   They have made some changes due to hypoglycemia overnight         Benefits, risks, possible drug interactions, and side effects of all new medications were reviewed with the patient. Pt verbalized understanding. Return to clinic: 4-6 weeks for primidone    An electronic signature was used to authenticate this note. Ping Medina MD  [facility]  1/13/2022    No future appointments. Objective   Vitals:       Visit Vitals  /89 (BP 1 Location: Left upper arm, BP Patient Position: Sitting, BP Cuff Size: Adult)   Pulse 99   Temp 97.3 °F (36.3 °C) (Temporal)   Resp 14   Ht 6' 2\" (1.88 m)   Wt 200 lb (90.7 kg)   SpO2 97%   BMI 25.68 kg/m²        Physical Exam  Constitutional:       Appearance: Normal appearance. He is not ill-appearing. Cardiovascular:      Rate and Rhythm: Normal rate. Pulmonary:      Effort: No respiratory distress. Neurological:      Mental Status: He is alert. Current Outpatient Medications   Medication Sig    primidone (MYSOLINE) 50 mg tablet Take 1 Tablet by mouth daily for 3 days, THEN 2 Tablets daily for 3 days, THEN 3 Tablets daily for 3 days, THEN 4 Tablets daily for 3 days, THEN 5 Tablets daily for 18 days.  ALPRAZolam (XANAX) 0.25 mg tablet Take 1 Tablet by mouth two (2) times daily as needed for Anxiety or Sleep. Max Daily Amount: 0.5 mg.    [START ON 2/1/2022] primidone (MYSOLINE) 250 mg tablet Take 1 Tablet by mouth daily. Indications: essential tremor    flash glucose sensor (FreeStyle Epifanio 2 Sensor) kit Use to check your blood sugars throughout the day. Submit directly to voucher information below    dulaglutide (Trulicity) 3 GC/5.8 mL pnij 3 mg by SubCUTAneous route every seven (7) days. Indications: type 2 diabetes mellitus    famotidine (PEPCID) 20 mg tablet Take 1 Tablet by mouth nightly.     Blood-Glucose Meter monitoring kit Use to check blood sugars twice daily - patient using one touch verio strips but needs new meter  metFORMIN ER (GLUCOPHAGE XR) 500 mg tablet Take 2 Tablets by mouth two (2) times a day. Indications: type 2 diabetes mellitus    atorvastatin (LIPITOR) 20 mg tablet Take 20 mg by mouth daily.  propranolol LA (INDERAL LA) 80 mg SR capsule Take 1 Capsule by mouth daily. Indications: tremor    lisinopriL (PRINIVIL, ZESTRIL) 10 mg tablet Take 1 Tablet by mouth daily. Indications: high blood pressure (Patient taking differently: Take 20 mg by mouth daily. Indications: high blood pressure)    glimepiride (AMARYL) 2 mg tablet Take 1 Tablet by mouth daily. Indications: type 2 diabetes mellitus    insulin glargine (LANTUS,BASAGLAR) 100 unit/mL (3 mL) inpn 30 Units by SubCUTAneous route every evening. Indications: type 2 diabetes mellitus    cholecalciferol (VITAMIN D3) (1000 Units /25 mcg) tablet Take 1 Tab by mouth daily.  lancets (One Touch Delica) 33 gauge misc Use 1-4 times daily to monitor sugar    glucose blood VI test strips (OneTouch Verio test strips) strip Use 3-4 times daily to monitor glucose    omeprazole (PRILOSEC) 20 mg capsule Take 1 Cap by mouth daily. Indications: GERD    buPROPion XL (Wellbutrin XL) 300 mg XL tablet Take 1 Tab by mouth every morning. Indications: anxiety    sertraline (Zoloft) 50 mg tablet Take 1 Tab by mouth daily. Indications: anxiety    fenofibric acid (TRILIPIX ER) 135 mg capsule Take 1 Cap by mouth daily. No current facility-administered medications for this visit.

## 2022-01-13 NOTE — PATIENT INSTRUCTIONS
Start ozempic to replace Trulicity - do 3.65TI once weekly for 2 weeks and then if tolerating, increase to 0.5mg once weekly    Continue Glimepiride 2mg once daily and Lantus 24 units once daily    Sensor sent to pharmacy - take voucher with you

## 2022-01-13 NOTE — PROGRESS NOTES
Pharmacist Visit for Diabetes Management & Education    S/O: Simon Rachel is a 55 y.o. male referred by Dr. Stephane Alfred MD for diabetes management. Patient is here for a follow up visit. Last visit we decreased lantus to 24 units once daily and glimepiride decreased to 2mg - was taking 4mg (instead of 2 mg) so cut it in half. Blood pressures - no issues, hasn't tested as much  Still a little high- 170-190/doesn't remember  Increased lisinopril to 20mg daily (2 10mg once daily)    Current Diabetes Regimen:  Lantus 24 units once daily  Glimepiride 2mg once daily  Trulicity 3mg once weekly  Metformin ER 500mg 2 tabs twice daily    ROS:   Patient denies hypoglycemic and hyperglycemic signs/symptoms, chest pain, shortness of breath, neuropathy, vision changes, and any foot changes. Self-Monitoring Blood Glucose:  Patient using CGM and checking at least 4 times daily        Data reviewed:  Lab Results   Component Value Date/Time    Hemoglobin A1c 6.8 (H) 02/23/2021 03:30 AM    Hemoglobin A1c 7.3 (H) 05/19/2010 04:14 PM    Hemoglobin A1c (POC) 10.1 11/30/2021 08:44 AM    Hemoglobin A1c (POC) 11.3 09/03/2021 08:36 AM    Glucose 307 (H) 01/13/2022 12:25 PM    Glucose (POC) 102 (H) 03/01/2021 05:02 PM    Microalbumin/Creat ratio (mg/g creat) 120 (H) 03/09/2021 12:12 PM    Microalbumin,urine random 17.70 03/09/2021 12:12 PM    LDL, calculated 131.6 (H) 03/09/2021 12:12 PM    Creatinine (POC) 0.9 05/12/2016 06:53 AM    Creatinine 1.61 (H) 01/13/2022 12:25 PM     Diabetes Health Maintenance:    Immunizations: There is no immunization history on file for this patient. Statin - ACEI/ARB - ASA  Key CAD CHF Meds             atorvastatin (LIPITOR) 20 mg tablet Take 20 mg by mouth daily. propranolol LA (INDERAL LA) 80 mg SR capsule Take 1 Capsule by mouth daily. Indications: tremor    lisinopriL (PRINIVIL, ZESTRIL) 10 mg tablet Take 1 Tablet by mouth daily.  Indications: high blood pressure    fenofibric acid (TRILIPIX ER) 135 mg capsule Take 1 Cap by mouth daily. Assessment/Plan:     1. Diabetes:  a1c not at goal <7% per ADA guidelines and TIR not at goal >70%. Will change Trulicity to Ozempic to see if side effects are better. PA already submitted. Sample given today until we figure out if PA approved. Will start at Ozempic 0.25mg x2 weeks and then increase to 0.5mg if tolerating. Patient to continue Lantus 24 units once daily and Glimepiride 2mg once daily. Voucher printed for [facility] and given to patient to  at the pharmacy. Patient verbalized understanding of the information presented and all of the patients questions were answered. AVS was handed to the patient and information reviewed. Patient advised to call me or Dr. Lora Garza MD with any additional questions or concerns. Follow-up: 2 weeks    Notification of recommendations will be sent to Dr. Lora Garza MD for review. Sam Rivera, PharmD, [facility], [facility] [address] in place:  Yes   Recommendation Provided To: Patient/Caregiver: 4 via In person   Intervention Detail: Discontinued Rx: 1, reason: Cost/Formulary Change, New Rx: 2, reason: Needs Additional Therapy and Patient Access Assistance/Sample Provided   Intervention Accepted By: Patient/Caregiver: 4   Time Spent (min): 30

## 2022-01-14 ENCOUNTER — DOCUMENTATION ONLY (OUTPATIENT)
Dept: INTERNAL MEDICINE CLINIC | Age: 57
End: 2022-01-14

## 2022-01-14 LAB
ANION GAP SERPL CALC-SCNC: 6 MMOL/L (ref 5–15)
BUN SERPL-MCNC: 20 MG/DL (ref 6–20)
BUN/CREAT SERPL: 12 (ref 12–20)
CALCIUM SERPL-MCNC: 10.3 MG/DL (ref 8.5–10.1)
CHLORIDE SERPL-SCNC: 101 MMOL/L (ref 97–108)
CO2 SERPL-SCNC: 28 MMOL/L (ref 21–32)
CREAT SERPL-MCNC: 1.61 MG/DL (ref 0.7–1.3)
GLUCOSE SERPL-MCNC: 307 MG/DL (ref 65–100)
POTASSIUM SERPL-SCNC: 4.8 MMOL/L (ref 3.5–5.1)
SODIUM SERPL-SCNC: 135 MMOL/L (ref 136–145)
VIT B12 SERPL-MCNC: 567 PG/ML (ref 193–986)

## 2022-01-14 NOTE — PROGRESS NOTES
PA approved for Ozempic 2mg/1.5ml  Quantity/ days 1.5/ 28days   Duration 1 year  1/13/2022-1/13/2023

## 2022-01-18 NOTE — PROGRESS NOTES
mychart message sent. Sodium 135. Creatinine 1.61 around baseline. Calcium 10.3.   B12 567    Sodium normal corrected for glucose  CKD - monitor  Calcium mildly elevated - monitor  B12 better - consider switching to PO

## 2022-03-15 ENCOUNTER — TELEPHONE (OUTPATIENT)
Dept: INTERNAL MEDICINE CLINIC | Age: 57
End: 2022-03-15

## 2022-03-15 NOTE — TELEPHONE ENCOUNTER
----- Message from [facility] sent at 3/15/2022  1:27 PM EDT -----  Subject: Appointment Request    Reason for Call: Urgent Back Neck Pain    QUESTIONS  Type of Appointment? New Patient/New to Provider  Reason for appointment request? Requested Provider unavailable - Rachel Neff  Additional Information for Provider? PT needs to be seen urgent for pain   in neck no avail time or date for PCP please call PT with avail times or   date   ---------------------------------------------------------------------------  --------------  CALL BACK INFO  What is the best way for the office to contact you? OK to leave message on   voicemail  Preferred Call Back Phone Number? [phone]  ---------------------------------------------------------------------------  --------------  SCRIPT ANSWERS  Relationship to Patient? Self  Did you have an injury or trauma within the past 3 days? No  Are you having new problems with your bowel or bladder control? No  Are you having new onset numbness, tingling, or weakness in your arms   and/or legs with this pain? No  Did your pain begin within the past 14 days? Yes  Have you been diagnosed with, awaiting test results for, or told that you   are suspected of having COVID-19 (Coronavirus)? (If patient has tested   negative or was tested as a requirement for work, school, or travel and   not based on symptoms, answer no)? No  Within the past 10 days have you developed any of the following symptoms   (answer no if symptoms have been present longer than 10 days or began   more than 10 days ago)? Fever or Chills, Cough, Shortness of breath or   difficulty breathing, Loss of taste or smell, Sore throat, Nasal   congestion, Sneezing or runny nose, Fatigue or generalized body aches   (answer no if pain is specific to a body part e.g. back pain), Diarrhea,   Headache? No  Have you had close contact with someone with COVID-19 in the last 7 days?    No  (Service Expert  click yes below to proceed with [employer] As Usual   Scheduling)?  Yes

## 2022-03-16 NOTE — TELEPHONE ENCOUNTER
Nurse returned call to patient. Patient c/o neck pain since Friday no injuries patient has tried heat compresses with muscles relaxer with no relief. Patient describe pain as dual pain as a \"crook in neck\". Nurse offered patient a visit with NP on Friday for acute visit patient declined stated that she has a lot of appointments that day to got to. Nurse gave patient number to dispatch help to see if [facility] could come out to his home today for evaluation.

## 2022-03-18 PROBLEM — E78.5 HYPERLIPIDEMIA, UNSPECIFIED HYPERLIPIDEMIA TYPE: Status: ACTIVE | Noted: 2021-03-09

## 2022-03-18 PROBLEM — G25.0 ESSENTIAL TREMOR: Status: ACTIVE | Noted: 2021-03-09

## 2022-03-18 PROBLEM — I10 ESSENTIAL HYPERTENSION: Status: ACTIVE | Noted: 2021-03-09

## 2022-03-18 PROBLEM — L72.9 SKIN CYST: Status: ACTIVE | Noted: 2021-11-02

## 2022-03-18 PROBLEM — E53.8 B12 DEFICIENCY: Status: ACTIVE | Noted: 2021-11-02

## 2022-03-18 PROBLEM — E87.1 HYPONATREMIA: Status: ACTIVE | Noted: 2022-01-13

## 2022-03-18 PROBLEM — R41.3 MEMORY LOSS: Status: ACTIVE | Noted: 2021-10-03

## 2022-03-19 PROBLEM — Z79.4 TYPE 2 DIABETES MELLITUS WITH MICROALBUMINURIA, WITH LONG-TERM CURRENT USE OF INSULIN (HCC): Status: ACTIVE | Noted: 2021-03-09

## 2022-03-19 PROBLEM — R53.83 FATIGUE: Status: ACTIVE | Noted: 2021-10-03

## 2022-03-19 PROBLEM — E87.5 ACUTE HYPERKALEMIA: Status: ACTIVE | Noted: 2021-02-23

## 2022-03-19 PROBLEM — E11.29 TYPE 2 DIABETES MELLITUS WITH MICROALBUMINURIA, WITH LONG-TERM CURRENT USE OF INSULIN (HCC): Status: ACTIVE | Noted: 2021-03-09

## 2022-03-19 PROBLEM — G62.9 NEUROPATHY: Status: ACTIVE | Noted: 2021-10-03

## 2022-03-19 PROBLEM — F33.0 MILD EPISODE OF RECURRENT MAJOR DEPRESSIVE DISORDER (HCC): Status: ACTIVE | Noted: 2021-10-03

## 2022-03-19 PROBLEM — R80.9 TYPE 2 DIABETES MELLITUS WITH MICROALBUMINURIA, WITH LONG-TERM CURRENT USE OF INSULIN (HCC): Status: ACTIVE | Noted: 2021-03-09

## 2022-03-19 PROBLEM — F98.8 ATTENTION DEFICIT DISORDER, UNSPECIFIED HYPERACTIVITY PRESENCE: Status: ACTIVE | Noted: 2021-03-09

## 2022-03-19 PROBLEM — K21.9 GASTROESOPHAGEAL REFLUX DISEASE WITHOUT ESOPHAGITIS: Status: ACTIVE | Noted: 2021-03-09

## 2022-03-19 PROBLEM — F41.9 ANXIETY: Status: ACTIVE | Noted: 2021-03-09

## 2022-03-20 PROBLEM — D47.2 MONOCLONAL GAMMOPATHY: Status: ACTIVE | Noted: 2021-11-02

## 2022-03-20 PROBLEM — D86.9 SARCOIDOSIS: Status: ACTIVE | Noted: 2021-02-23

## 2022-03-20 PROBLEM — N20.0 RECURRENT NEPHROLITHIASIS: Status: ACTIVE | Noted: 2021-02-23

## 2022-04-06 ENCOUNTER — HOSPITAL ENCOUNTER (OUTPATIENT)
Dept: GENERAL RADIOLOGY | Age: 57
Discharge: HOME | End: 2022-04-06
Payer: COMMERCIAL

## 2022-04-06 ENCOUNTER — OFFICE VISIT (OUTPATIENT)
Dept: INTERNAL MEDICINE CLINIC | Age: 57
End: 2022-04-06
Payer: COMMERCIAL

## 2022-04-06 VITALS
DIASTOLIC BLOOD PRESSURE: 94 MMHG | HEART RATE: 61 BPM | WEIGHT: 209 LBS | SYSTOLIC BLOOD PRESSURE: 161 MMHG | HEIGHT: 74 IN | TEMPERATURE: 97.3 F | BODY MASS INDEX: 26.82 KG/M2 | RESPIRATION RATE: 14 BRPM | OXYGEN SATURATION: 98 %

## 2022-04-06 DIAGNOSIS — R80.9 TYPE 2 DIABETES MELLITUS WITH MICROALBUMINURIA, WITH LONG-TERM CURRENT USE OF INSULIN (HCC): Primary | ICD-10-CM

## 2022-04-06 DIAGNOSIS — D86.9 SARCOIDOSIS: ICD-10-CM

## 2022-04-06 DIAGNOSIS — G25.0 ESSENTIAL TREMOR: ICD-10-CM

## 2022-04-06 DIAGNOSIS — E11.29 TYPE 2 DIABETES MELLITUS WITH MICROALBUMINURIA, WITH LONG-TERM CURRENT USE OF INSULIN (HCC): Primary | ICD-10-CM

## 2022-04-06 DIAGNOSIS — R09.89 LUNG CRACKLES: ICD-10-CM

## 2022-04-06 DIAGNOSIS — E53.8 B12 DEFICIENCY: ICD-10-CM

## 2022-04-06 DIAGNOSIS — R53.83 FATIGUE, UNSPECIFIED TYPE: ICD-10-CM

## 2022-04-06 DIAGNOSIS — Z79.4 TYPE 2 DIABETES MELLITUS WITH MICROALBUMINURIA, WITH LONG-TERM CURRENT USE OF INSULIN (HCC): Primary | ICD-10-CM

## 2022-04-06 LAB — HBA1C MFR BLD HPLC: 8.9 %

## 2022-04-06 PROCEDURE — 71046 X-RAY EXAM CHEST 2 VIEWS: CPT

## 2022-04-06 PROCEDURE — 83036 HEMOGLOBIN GLYCOSYLATED A1C: CPT | Performed by: INTERNAL MEDICINE

## 2022-04-06 PROCEDURE — 99214 OFFICE O/P EST MOD 30 MIN: CPT | Performed by: INTERNAL MEDICINE

## 2022-04-06 RX ORDER — SYRINGE W-NEEDLE,DISPOSAB,3 ML 25GX5/8"
SYRINGE, EMPTY DISPOSABLE MISCELLANEOUS
Qty: 6 EACH | Refills: 1 | Status: SHIPPED | OUTPATIENT
Start: 2022-04-06 | End: 2022-04-20 | Stop reason: SDUPTHER

## 2022-04-06 RX ORDER — CYANOCOBALAMIN 1000 UG/ML
1000 INJECTION, SOLUTION INTRAMUSCULAR; SUBCUTANEOUS EVERY 2 WEEKS
Qty: 6 ML | Refills: 1
Start: 2022-04-06 | End: 2022-04-20 | Stop reason: SDUPTHER

## 2022-04-06 RX ORDER — PRIMIDONE 50 MG/1
25 TABLET ORAL DAILY
Qty: 90 TABLET | Refills: 1 | Status: SHIPPED | OUTPATIENT
Start: 2022-04-06 | End: 2022-06-24

## 2022-04-06 NOTE — PROGRESS NOTES
Note   Chief Complaint   Fatigue    Yovany Torres is a 55 y.o. male     1. Type 2 diabetes mellitus with microalbuminuria, with long-term current use of insulin ([facility])  Assessment & Plan:  Had been following with Brian Harish for diabetes  Tried to get freestyle covered but unable to  A1c today 8.9%  Recommend starting Jardiance  Continue other medications  trulicity 3mg once a week  Glimepiride 2 mg  lantus 24 qhs   Metformin 1000 BID  Orders:  -     AMB POC HEMOGLOBIN A1C  -     empagliflozin (JARDIANCE) 10 mg tablet; Take 1 Tablet by mouth daily. Indications: type 2 diabetes mellitus, Normal, Disp-30 Tablet, R-1  2. Essential tremor  Assessment & Plan:   Was started on primidone last visit with the goal to get to 250. However, developed dizziness and fatigue with the medication. He tolerated primidone 25 mg daily. Felt like this was helping  Restart primidone 25 mg daily. Continue propranolol 80  Orders:  -     primidone (MYSOLINE) 50 mg tablet; Take 0.5 Tablets by mouth daily. Indications: essential tremor, Normal, Disp-90 Tablet, R-1  3. B12 deficiency  Assessment & Plan:  Previously on B12 injections. Last check was mid normal.  Had plan to switch to oral but for some reason that did not happen and has been out of B12. Fatigue getting worse  Recommend injections every 2 weeks for goal B12 greater [id number]. His stepdaughter has nursing experiencing and can do the injections for him  Orders:  -     cyanocobalamin (VITAMIN B12) 1,000 mcg/mL injection; 1 mL by IntraMUSCular route Once every 2 weeks. Indications: inadequate vitamin B12, No Print, Disp-6 mL, R-1  -     Syringe with Needle, Disp, (Syringe 3cc/25Gx1\") 3 mL 25 gauge x 1\" syrg; Use every other week with B12 injections, Normal, Disp-6 Each, R-1  4. Lung crackles  -     XR CHEST PA LAT; Future  5.  Fatigue, unspecified type  Assessment & Plan:   Fatigue worse after not having b12 injection  No chest pain, shortness of breath, fever, chills, cough, bleeding  Reports not feeling refreshed when he wakes up  Positive snoring  Stop bang score 5  We discussed sleep study but he would like to defer to see if his B12 injections will help  Restart B12 injections  Also likely multifactorial, wife is also sick  6. Sarcoidosis  Assessment & Plan:  Crackles noted left lower base. Given fatigue, recommend chest x-ray  Chest x-ray normal.  Follow-up with sarcoid clinic       Benefits, risks, possible drug interactions, and side effects of all new medications were reviewed with the patient. Pt verbalized understanding. Return to clinic: 1 month for diabetes, tremor, B12  4/6 - Step grand daughter born yesterday  chad-tie    An electronic signature was used to authenticate this note. Kwesi Schaffer MD  [facility]  4/6/2022    Future Appointments   Date Time Provider Patti Dumonti   8/1/4344 67:78 AM Annalisa Washington MD [facility] BS AMB        Objective   Vitals:       Visit Vitals  BP (!) 161/94 (BP 1 Location: Left upper arm, BP Patient Position: Sitting, BP Cuff Size: Adult)   Pulse 61   Temp 97.3 °F (36.3 °C) (Oral)   Resp 14   Ht 6' 2\" (1.88 m)   Wt 209 lb (94.8 kg)   SpO2 98%   BMI 26.83 kg/m²        Physical Exam  Constitutional:       Appearance: Normal appearance. He is not ill-appearing. Cardiovascular:      Rate and Rhythm: Normal rate and regular rhythm. Heart sounds: No murmur heard. No friction rub. No gallop. Pulmonary:      Effort: No respiratory distress. Breath sounds: Normal breath sounds. No wheezing, rhonchi or rales. Neurological:      Mental Status: He is alert. Current Outpatient Medications   Medication Sig    primidone (MYSOLINE) 50 mg tablet Take 0.5 Tablets by mouth daily. Indications: essential tremor    empagliflozin (JARDIANCE) 10 mg tablet Take 1 Tablet by mouth daily.  Indications: type 2 diabetes mellitus    cyanocobalamin (VITAMIN B12) 1,000 mcg/mL injection 1 mL by IntraMUSCular route Once every 2 weeks. Indications: inadequate vitamin B12    Syringe with Needle, Disp, (Syringe 3cc/25Gx1\") 3 mL 25 gauge x 1\" syrg Use every other week with B12 injections    flash glucose sensor (FreeStyle Epifanio 2 Sensor) kit Use to check your blood sugars throughout the day. Submit directly to voucher information below    ALPRAZolam (XANAX) 0.25 mg tablet Take 1 Tablet by mouth two (2) times daily as needed for Anxiety or Sleep. Max Daily Amount: 0.5 mg.    dulaglutide (Trulicity) 3 HK/9.1 mL pnij 3 mg by SubCUTAneous route every seven (7) days. Indications: type 2 diabetes mellitus    famotidine (PEPCID) 20 mg tablet Take 1 Tablet by mouth nightly.  Blood-Glucose Meter monitoring kit Use to check blood sugars twice daily - patient using one touch verio strips but needs new meter    metFORMIN ER (GLUCOPHAGE XR) 500 mg tablet Take 2 Tablets by mouth two (2) times a day. Indications: type 2 diabetes mellitus    atorvastatin (LIPITOR) 20 mg tablet Take 20 mg by mouth daily.  propranolol LA (INDERAL LA) 80 mg SR capsule Take 1 Capsule by mouth daily. Indications: tremor    lisinopriL (PRINIVIL, ZESTRIL) 10 mg tablet Take 1 Tablet by mouth daily. Indications: high blood pressure (Patient taking differently: Take 20 mg by mouth daily. Indications: high blood pressure)    glimepiride (AMARYL) 2 mg tablet Take 1 Tablet by mouth daily. Indications: type 2 diabetes mellitus    insulin glargine (LANTUS,BASAGLAR) 100 unit/mL (3 mL) inpn 30 Units by SubCUTAneous route every evening. Indications: type 2 diabetes mellitus    cholecalciferol (VITAMIN D3) (1000 Units /25 mcg) tablet Take 1 Tab by mouth daily.  lancets (One Touch Delica) 33 gauge misc Use 1-4 times daily to monitor sugar    glucose blood VI test strips (OneTouch Verio test strips) strip Use 3-4 times daily to monitor glucose    omeprazole (PRILOSEC) 20 mg capsule Take 1 Cap by mouth daily.  Indications: GERD    buPROPion XL (Wellbutrin XL) 300 mg XL tablet Take 1 Tab by mouth every morning. Indications: anxiety    sertraline (Zoloft) 50 mg tablet Take 1 Tab by mouth daily. Indications: anxiety    fenofibric acid (TRILIPIX ER) 135 mg capsule Take 1 Cap by mouth daily. No current facility-administered medications for this visit.

## 2022-04-06 NOTE — ASSESSMENT & PLAN NOTE
Was started on primidone last visit with the goal to get to 250. However, developed dizziness and fatigue with the medication. He tolerated primidone 25 mg daily. Felt like this was helping  Restart primidone 25 mg daily.   Continue propranolol 80

## 2022-04-06 NOTE — ASSESSMENT & PLAN NOTE
Fatigue worse after not having b12 injection  No chest pain, shortness of breath, fever, chills, cough, bleeding  Reports not feeling refreshed when he wakes up  Positive snoring  Stop bang score 5  We discussed sleep study but he would like to defer to see if his B12 injections will help  Restart B12 injections  Also likely multifactorial, wife is also sick

## 2022-04-06 NOTE — ASSESSMENT & PLAN NOTE
Crackles noted left lower base.   Given fatigue, recommend chest x-ray  Chest x-ray normal.  Follow-up with sarcoid clinic

## 2022-04-06 NOTE — ASSESSMENT & PLAN NOTE
Previously on B12 injections. Last check was mid normal.  Had plan to switch to oral but for some reason that did not happen and has been out of B12. Fatigue getting worse  Recommend injections every 2 weeks for goal B12 greater [id number].   His stepdaughter has nursing experiencing and can do the injections for him

## 2022-04-06 NOTE — ASSESSMENT & PLAN NOTE
Had been following with Balbir Rowe for diabetes  Tried to get freestyle covered but unable to  A1c today 8.9%  Recommend starting Jardiance  Continue other medications  trulicity 3mg once a week  Glimepiride 2 mg  lantus 24 qhs   Metformin 1000 BID

## 2022-04-11 PROBLEM — R09.89 LUNG CRACKLES: Status: ACTIVE | Noted: 2022-04-06

## 2022-04-16 ENCOUNTER — PATIENT MESSAGE (OUTPATIENT)
Dept: INTERNAL MEDICINE CLINIC | Age: 57
End: 2022-04-16

## 2022-04-16 DIAGNOSIS — E53.8 B12 DEFICIENCY: ICD-10-CM

## 2022-04-20 RX ORDER — SYRINGE W-NEEDLE,DISPOSAB,3 ML 25GX5/8"
SYRINGE, EMPTY DISPOSABLE MISCELLANEOUS
Qty: 6 EACH | Refills: 1 | Status: SHIPPED | OUTPATIENT
Start: 2022-04-20

## 2022-04-20 RX ORDER — CYANOCOBALAMIN 1000 UG/ML
1000 INJECTION, SOLUTION INTRAMUSCULAR; SUBCUTANEOUS EVERY 2 WEEKS
Qty: 6 ML | Refills: 1 | Status: SHIPPED | OUTPATIENT
Start: 2022-04-20 | End: 2022-06-09 | Stop reason: SDUPTHER

## 2022-04-20 NOTE — TELEPHONE ENCOUNTER
From: Petra Schaefer  Sent: 4/19/2022 4:14 PM EDT  To: [facility]es Pool  Subject: Status of B-12? They told me today that they never received a request for the B-12 or syringes.  They asked that your office actually call the request in to ensure it is received

## 2022-05-05 DIAGNOSIS — K21.9 GASTROESOPHAGEAL REFLUX DISEASE WITHOUT ESOPHAGITIS: ICD-10-CM

## 2022-05-05 DIAGNOSIS — E11.29 TYPE 2 DIABETES MELLITUS WITH MICROALBUMINURIA, WITH LONG-TERM CURRENT USE OF INSULIN (HCC): ICD-10-CM

## 2022-05-05 DIAGNOSIS — R80.9 TYPE 2 DIABETES MELLITUS WITH MICROALBUMINURIA, WITH LONG-TERM CURRENT USE OF INSULIN (HCC): ICD-10-CM

## 2022-05-05 DIAGNOSIS — I10 ESSENTIAL HYPERTENSION: ICD-10-CM

## 2022-05-05 DIAGNOSIS — Z79.4 TYPE 2 DIABETES MELLITUS WITH MICROALBUMINURIA, WITH LONG-TERM CURRENT USE OF INSULIN (HCC): ICD-10-CM

## 2022-05-05 RX ORDER — LISINOPRIL 10 MG/1
10 TABLET ORAL DAILY
Qty: 90 TABLET | Refills: 1 | Status: SHIPPED | OUTPATIENT
Start: 2022-05-05 | End: 2022-05-05

## 2022-05-05 RX ORDER — OMEPRAZOLE 20 MG/1
20 CAPSULE, DELAYED RELEASE ORAL DAILY
Qty: 90 CAPSULE | Refills: 3 | Status: SHIPPED | OUTPATIENT
Start: 2022-05-05

## 2022-05-05 RX ORDER — DULAGLUTIDE 3 MG/.5ML
3 INJECTION, SOLUTION SUBCUTANEOUS
Qty: 4 EACH | Refills: 1 | Status: SHIPPED | OUTPATIENT
Start: 2022-05-05 | End: 2022-08-08

## 2022-05-05 RX ORDER — LISINOPRIL 20 MG/1
20 TABLET ORAL DAILY
Qty: 90 TABLET | Refills: 1 | Status: SHIPPED | OUTPATIENT
Start: 2022-05-05

## 2022-05-05 RX ORDER — INSULIN GLARGINE 100 [IU]/ML
30 INJECTION, SOLUTION SUBCUTANEOUS EVERY EVENING
Qty: 27 ML | Refills: 1 | Status: SHIPPED | OUTPATIENT
Start: 2022-05-05 | End: 2022-09-06

## 2022-05-05 RX ORDER — GLIMEPIRIDE 2 MG/1
2 TABLET ORAL DAILY
Qty: 90 TABLET | Refills: 1 | Status: SHIPPED | OUTPATIENT
Start: 2022-05-05 | End: 2022-06-24

## 2022-05-05 NOTE — TELEPHONE ENCOUNTER
Please advise - we had increased his lisinopril to 20 but I think he'd been taking 2 10's.  I've sent in a prescription for lisinopril 20mg tabs- please make sure he knows to take just 1 pill of lisinopril 20

## 2022-05-06 ENCOUNTER — APPOINTMENT (OUTPATIENT)
Dept: CT IMAGING | Age: 57
End: 2022-05-06
Attending: STUDENT IN AN ORGANIZED HEALTH CARE EDUCATION/TRAINING PROGRAM
Payer: COMMERCIAL

## 2022-05-06 ENCOUNTER — HOSPITAL ENCOUNTER (EMERGENCY)
Age: 57
Discharge: HOME | End: 2022-05-06
Attending: STUDENT IN AN ORGANIZED HEALTH CARE EDUCATION/TRAINING PROGRAM
Payer: COMMERCIAL

## 2022-05-06 VITALS
WEIGHT: 201.94 LBS | BODY MASS INDEX: 25.93 KG/M2 | SYSTOLIC BLOOD PRESSURE: 107 MMHG | DIASTOLIC BLOOD PRESSURE: 79 MMHG | HEART RATE: 75 BPM | RESPIRATION RATE: 20 BRPM | TEMPERATURE: 97.7 F | OXYGEN SATURATION: 96 %

## 2022-05-06 DIAGNOSIS — N20.0 KIDNEY STONE: Primary | ICD-10-CM

## 2022-05-06 LAB
ALBUMIN SERPL-MCNC: 4 G/DL (ref 3.5–5)
ALBUMIN/GLOB SERPL: 1 {RATIO} (ref 1.1–2.2)
ALP SERPL-CCNC: 129 U/L (ref 45–117)
ALT SERPL-CCNC: 38 U/L (ref 12–78)
ANION GAP SERPL CALC-SCNC: 8 MMOL/L (ref 5–15)
APPEARANCE UR: CLEAR
AST SERPL-CCNC: 31 U/L (ref 15–37)
BACTERIA URNS QL MICRO: NEGATIVE /HPF
BASOPHILS # BLD: 0.1 K/UL (ref 0–0.1)
BASOPHILS NFR BLD: 1 % (ref 0–1)
BILIRUB SERPL-MCNC: 0.6 MG/DL (ref 0.2–1)
BILIRUB UR QL CFM: NEGATIVE
BUN SERPL-MCNC: 14 MG/DL (ref 6–20)
BUN/CREAT SERPL: 9 (ref 12–20)
CALCIUM SERPL-MCNC: 9.2 MG/DL (ref 8.5–10.1)
CAOX CRY URNS QL MICRO: ABNORMAL
CHLORIDE SERPL-SCNC: 101 MMOL/L (ref 97–108)
CO2 SERPL-SCNC: 29 MMOL/L (ref 21–32)
COLOR UR: ABNORMAL
CREAT SERPL-MCNC: 1.49 MG/DL (ref 0.7–1.3)
DIFFERENTIAL METHOD BLD: ABNORMAL
EOSINOPHIL # BLD: 0.2 K/UL (ref 0–0.4)
EOSINOPHIL NFR BLD: 1 % (ref 0–7)
EPITH CASTS URNS QL MICRO: ABNORMAL /LPF
ERYTHROCYTE [DISTWIDTH] IN BLOOD BY AUTOMATED COUNT: 13.5 % (ref 11.5–14.5)
GLOBULIN SER CALC-MCNC: 4.2 G/DL (ref 2–4)
GLUCOSE SERPL-MCNC: 164 MG/DL (ref 65–100)
GLUCOSE UR STRIP.AUTO-MCNC: NEGATIVE MG/DL
HCT VFR BLD AUTO: 46.8 % (ref 36.6–50.3)
HGB BLD-MCNC: 14.5 G/DL (ref 12.1–17)
HGB UR QL STRIP: ABNORMAL
IMM GRANULOCYTES # BLD AUTO: 0.1 K/UL (ref 0–0.04)
IMM GRANULOCYTES NFR BLD AUTO: 1 % (ref 0–0.5)
KETONES UR QL STRIP.AUTO: NEGATIVE MG/DL
LEUKOCYTE ESTERASE UR QL STRIP.AUTO: NEGATIVE
LIPASE SERPL-CCNC: 158 U/L (ref 73–393)
LYMPHOCYTES # BLD: 1.4 K/UL (ref 0.8–3.5)
LYMPHOCYTES NFR BLD: 10 % (ref 12–49)
MCH RBC QN AUTO: 26 PG (ref 26–34)
MCHC RBC AUTO-ENTMCNC: 31 G/DL (ref 30–36.5)
MCV RBC AUTO: 84 FL (ref 80–99)
MONOCYTES # BLD: 1.1 K/UL (ref 0–1)
MONOCYTES NFR BLD: 8 % (ref 5–13)
MUCOUS THREADS URNS QL MICRO: ABNORMAL /LPF
NEUTS SEG # BLD: 11.2 K/UL (ref 1.8–8)
NEUTS SEG NFR BLD: 79 % (ref 32–75)
NITRITE UR QL STRIP.AUTO: NEGATIVE
NRBC # BLD: 0 K/UL (ref 0–0.01)
NRBC BLD-RTO: 0 PER 100 WBC
PH UR STRIP: 5.5 [PH] (ref 5–8)
PLATELET # BLD AUTO: 419 K/UL (ref 150–400)
PMV BLD AUTO: 11 FL (ref 8.9–12.9)
POTASSIUM SERPL-SCNC: 4.7 MMOL/L (ref 3.5–5.1)
PROT SERPL-MCNC: 8.2 G/DL (ref 6.4–8.2)
PROT UR STRIP-MCNC: 100 MG/DL
RBC # BLD AUTO: 5.57 M/UL (ref 4.1–5.7)
RBC #/AREA URNS HPF: >100 /HPF (ref 0–5)
SODIUM SERPL-SCNC: 138 MMOL/L (ref 136–145)
SP GR UR REFRACTOMETRY: 1.02 (ref 1–1.03)
UR CULT HOLD, URHOLD: NORMAL
UROBILINOGEN UR QL STRIP.AUTO: 0.2 EU/DL (ref 0.2–1)
WBC # BLD AUTO: 14 K/UL (ref 4.1–11.1)
WBC URNS QL MICRO: ABNORMAL /HPF (ref 0–4)

## 2022-05-06 PROCEDURE — 74176 CT ABD & PELVIS W/O CONTRAST: CPT

## 2022-05-06 PROCEDURE — 80053 COMPREHEN METABOLIC PANEL: CPT

## 2022-05-06 PROCEDURE — 99284 EMERGENCY DEPT VISIT MOD MDM: CPT

## 2022-05-06 PROCEDURE — 36415 COLL VENOUS BLD VENIPUNCTURE: CPT

## 2022-05-06 PROCEDURE — 81001 URINALYSIS AUTO W/SCOPE: CPT

## 2022-05-06 PROCEDURE — 96375 TX/PRO/DX INJ NEW DRUG ADDON: CPT

## 2022-05-06 PROCEDURE — 85025 COMPLETE CBC W/AUTO DIFF WBC: CPT

## 2022-05-06 PROCEDURE — 96374 THER/PROPH/DIAG INJ IV PUSH: CPT

## 2022-05-06 PROCEDURE — 74011250636 HC RX REV CODE- 250/636: Performed by: STUDENT IN AN ORGANIZED HEALTH CARE EDUCATION/TRAINING PROGRAM

## 2022-05-06 PROCEDURE — 83690 ASSAY OF LIPASE: CPT

## 2022-05-06 RX ORDER — KETOROLAC TROMETHAMINE 10 MG/1
10 TABLET, FILM COATED ORAL
Qty: 20 TABLET | Refills: 0 | Status: SHIPPED | OUTPATIENT
Start: 2022-05-06 | End: 2022-05-06 | Stop reason: SDUPTHER

## 2022-05-06 RX ORDER — OXYCODONE AND ACETAMINOPHEN 5; 325 MG/1; MG/1
1 TABLET ORAL
Status: DISCONTINUED | OUTPATIENT
Start: 2022-05-06 | End: 2022-05-06 | Stop reason: HOSPADM

## 2022-05-06 RX ORDER — ONDANSETRON 2 MG/ML
4 INJECTION INTRAMUSCULAR; INTRAVENOUS
Status: COMPLETED | OUTPATIENT
Start: 2022-05-06 | End: 2022-05-06

## 2022-05-06 RX ORDER — ONDANSETRON 4 MG/1
4 TABLET, ORALLY DISINTEGRATING ORAL
Qty: 20 TABLET | Refills: 0 | Status: SHIPPED | OUTPATIENT
Start: 2022-05-06 | End: 2022-09-13 | Stop reason: SDUPTHER

## 2022-05-06 RX ORDER — KETOROLAC TROMETHAMINE 10 MG/1
10 TABLET, FILM COATED ORAL
Qty: 20 TABLET | Refills: 0 | Status: SHIPPED | OUTPATIENT
Start: 2022-05-06 | End: 2022-06-24

## 2022-05-06 RX ORDER — OXYCODONE HYDROCHLORIDE 5 MG/1
5 TABLET ORAL
Qty: 20 TABLET | Refills: 0 | Status: SHIPPED | OUTPATIENT
Start: 2022-05-06 | End: 2022-05-11

## 2022-05-06 RX ORDER — KETOROLAC TROMETHAMINE 30 MG/ML
15 INJECTION, SOLUTION INTRAMUSCULAR; INTRAVENOUS
Status: COMPLETED | OUTPATIENT
Start: 2022-05-06 | End: 2022-05-06

## 2022-05-06 RX ADMIN — KETOROLAC TROMETHAMINE 15 MG: 30 INJECTION, SOLUTION INTRAMUSCULAR at 12:59

## 2022-05-06 RX ADMIN — ONDANSETRON HYDROCHLORIDE 4 MG: 2 SOLUTION INTRAMUSCULAR; INTRAVENOUS at 12:59

## 2022-05-06 NOTE — ED PROVIDER NOTES
Patient is a 60-year-old male with a history of kidney stones from sarcoidosis. He is presenting the ED with acute right flank pain similar to previous kidney stone problems. Flank Pain   This is a recurrent problem. The current episode started 3 to 5 hours ago. The problem has not changed since onset. The problem occurs constantly. Patient reports not work related injury. The pain is associated with no known injury. The pain is present in the right side. The pain radiates to the right groin. The pain is at a severity of 5/10. The pain is moderate. The symptoms are aggravated by certain positions. The pain is the same all the time. Associated symptoms include dysuria. Pertinent negatives include no chest pain, no fever and no abdominal pain. He has tried NSAIDs for the symptoms. The treatment provided no relief. Risk factors include history of kidney stones.         Past Medical History:   Diagnosis Date    Cervical stenosis of spinal canal 5/13/2016    Diabetes ([facility] [address]     Hypertension        Past Surgical History:   Procedure Laterality Date    HX CERVICAL FUSION      HX ORTHOPAEDIC      multi lumbar lami/fusions    HX TONSILLECTOMY      HX UROLOGICAL      Kidney Stones \"vacuumed\"    [place] ABDOMEN SURGERY PROC UNLISTED  2013    liver biopsy         Family History:   Problem Relation Age of Onset    Dementia Mother     Heart Attack Father     Cancer Neg Hx        Social History     Socioeconomic History    Marital status:      Spouse name: Not on file    Number of children: Not on file    Years of education: Not on file    Highest education level: Not on file   Occupational History    Not on file   Tobacco Use    Smoking status: Former Smoker    Smokeless tobacco: Never Used    Tobacco comment: occl cigar 5-6x/year   Vaping Use    Vaping Use: Never used   Substance and Sexual Activity    Alcohol use: Not Currently     Comment: a few times a year    Drug use: Yes     Types: Marijuana Comment: hx marijuana    Sexual activity: Not on file   Other Topics Concern    Not on file   Social History Narrative    Not on file     Social Determinants of Health     Financial Resource Strain:     Difficulty of Paying Living Expenses: Not on file   Food Insecurity:     Worried About Running Out of Food in the Last Year: Not on file    Xi of Food in the Last Year: Not on file   Transportation Needs:     Lack of Transportation (Medical): Not on file    Lack of Transportation (Non-Medical): Not on file   Physical Activity:     Days of Exercise per Week: Not on file    Minutes of Exercise per Session: Not on file   Stress:     Feeling of Stress : Not on file   Social Connections:     Frequency of Communication with Friends and Family: Not on file    Frequency of Social Gatherings with Friends and Family: Not on file    Attends Sikhism Services: Not on file    Active Member of [address] [place] [address] Highlight or Organizations: Not on file    Attends Club or Organization Meetings: Not on file    Marital Status: Not on file   Intimate Partner Violence:     Fear of Current or Ex-Partner: Not on file    Emotionally Abused: Not on file    Physically Abused: Not on file    Sexually Abused: Not on file   Housing Stability:     Unable to Pay for Housing in the Last Year: Not on file    Number of [place] in the Last Year: Not on file    Unstable Housing in the Last Year: Not on file         ALLERGIES: Patient has no known allergies. Review of Systems   Constitutional: Positive for activity change, chills and fatigue. Negative for fever. HENT: Negative. Eyes: Negative. Respiratory: Negative. Cardiovascular: Negative. Negative for chest pain. Gastrointestinal: Negative. Negative for abdominal pain. Endocrine: Negative. Genitourinary: Positive for dysuria and flank pain. Skin: Negative. Allergic/Immunologic: Negative. Neurological: Negative. Hematological: Negative. Psychiatric/Behavioral: Negative. Vitals:    05/06/22 1403 05/06/22 1453 05/06/22 1533 05/06/22 1603   BP: 135/87 (!) 130/91 118/84 107/79   Pulse: 79 74 78 75   Resp:       Temp:       SpO2: 94% 95% 93% 96%   Weight:                Physical Exam  Vitals and nursing note reviewed. Constitutional:       General: He is not in acute distress. Appearance: Normal appearance. HENT:      Head: Normocephalic and atraumatic. Right Ear: External ear normal.      Left Ear: External ear normal.      Nose: Nose normal.   Eyes:      Extraocular Movements: Extraocular movements intact. Conjunctiva/sclera: Conjunctivae normal.   Cardiovascular:      Rate and Rhythm: Normal rate. Pulses: Normal pulses. Radial pulses are 2+ on the right side and 2+ on the left side. Heart sounds: Normal heart sounds. Pulmonary:      Effort: Pulmonary effort is normal.      Breath sounds: Normal breath sounds. Chest:      Chest wall: No deformity or tenderness. Abdominal:      General: Abdomen is flat. There is no distension. Tenderness: There is no abdominal tenderness. There is right CVA tenderness. Musculoskeletal:         General: No deformity or signs of injury. Normal range of motion. Cervical back: Normal range of motion and neck supple. No tenderness. Skin:     General: Skin is warm and dry. Capillary Refill: Capillary refill takes less than 2 seconds. Neurological:      General: No focal deficit present. Mental Status: He is alert and oriented to person, place, and time. Psychiatric:         Attention and Perception: Attention normal.         Mood and Affect: Mood normal.         Behavior: Behavior normal.          [facility]  ED Course as of 05/06/22 2246   Fri May 06, 2022   1356 Bacteria: Negative [AL]   1513 Dr. Guy Bhatia will see Monday.   [AL]      ED Course User Index  [AL] Trini Gage MD     LABORATORY RESULTS:  Labs Reviewed   CBC WITH AUTOMATED DIFF - Abnormal; Notable for the following components:       Result Value    WBC 14.0 (*)     PLATELET 059 (*)     NEUTROPHILS 79 (*)     LYMPHOCYTES 10 (*)     IMMATURE GRANULOCYTES 1 (*)     ABS. NEUTROPHILS 11.2 (*)     ABS. MONOCYTES 1.1 (*)     ABS. IMM. GRANS. 0.1 (*)     All other components within normal limits   METABOLIC PANEL, COMPREHENSIVE - Abnormal; Notable for the following components:    Glucose 164 (*)     Creatinine 1.49 (*)     BUN/Creatinine ratio 9 (*)     GFR est AA 59 (*)     GFR est non-AA 49 (*)     Alk. phosphatase 129 (*)     Globulin 4.2 (*)     A-G Ratio 1.0 (*)     All other components within normal limits   URINALYSIS W/MICROSCOPIC - Abnormal; Notable for the following components:    Protein 100 (*)     Blood LARGE (*)     RBC >100 (*)     Mucus 1+ (*)     CA Oxalate crystals FEW (*)     All other components within normal limits   URINE CULTURE HOLD SAMPLE   LIPASE   BILIRUBIN, CONFIRM       IMAGING RESULTS:  CT ABD PELV WO CONT   Final Result   1. A 7 mm calculus in the mid to distal right ureter results in moderate right   hydroureteronephrosis. 2. Cholelithiasis. 3. Stable mildly nodular liver surface raising the possibility of cirrhosis. MEDICATIONS GIVEN:  Medications   ketorolac (TORADOL) injection 15 mg (15 mg IntraVENous Given 5/6/22 1259)   ondansetron (ZOFRAN) injection 4 mg (4 mg IntraVENous Given 5/6/22 1259)       Differential diagnosis: Kidney stone, urinary tract infection, MATT, obstruction    ED physician interpretation of laboratory results: UA without signs of infection, mild elevation white blood cell count consistent with stress response to pain from stone. MDM: Patient is a 61-year-old male presented ED with acute right flank pain with CT showing some level of stone with mild obstruction but no significant kidney injury or signs of infection on UA. Discussed patient with urology and they recommend close outpatient follow-up.   Return precautions given. Patient and spouse comfortable with discharge at this time. Further patient specific instructions as below    Key discharge instructions and summary of care: You presented to the ED with right flank pain. You are found to have a 7 mm kidney stone. No signs of infection or kidney damage at this time. Urology was contacted and they will follow up with you on Monday. Prescriptions for pain medication were sent to your pharmacy. If pain is worsening or not resolving with medications please call the kidney stone hotline. If they are to help you return to the ED for further evaluation. The patient has been re-evaluated and feeling better. Patient is stable for discharge. All available radiology and laboratory results have been reviewed with patient and/or available family. Patient and/or family verbally conveyed their understanding and agreement of the patient's signs, symptoms, diagnosis, treatment and prognosis and additionally agree to follow-up as recommended in the discharge instructions or to return to the Emergency Department should their condition change or worsen prior to their follow-up appointment. All questions have been answered and patient and/or available family express understanding. IMPRESSION:  1. Kidney stone        DISPOSITION: Discharged     Sourav Mobley MD      Procedures

## 2022-05-06 NOTE — TELEPHONE ENCOUNTER
Nurse have attempted without success to contact this patient by phone nurse  left a voice mail regarding recommendations from pcp advised for a call back if needed.

## 2022-05-06 NOTE — ED TRIAGE NOTES
Pt c/o right flank pain radiating to groin and into testicle starting last night. Pt relates to previous kidney stones. Pt presents to ED awake, alert, oriented, ambulatory, pale and uncomfortable.

## 2022-05-06 NOTE — DISCHARGE INSTRUCTIONS
You presented to the ED with right flank pain. You are found to have a 7 mm kidney stone. No signs of infection or kidney damage at this time. Urology was contacted and they will follow up with you on Monday. Prescriptions for pain medication were sent to your pharmacy. If pain is worsening or not resolving with medications please call the kidney stone hotline. If they are to help you return to the ED for further evaluation.

## 2022-06-07 DIAGNOSIS — E55.9 VITAMIN D DEFICIENCY: ICD-10-CM

## 2022-06-07 RX ORDER — CHOLECALCIFEROL (VITAMIN D3) 25 MCG
TABLET ORAL
Qty: 90 TABLET | Refills: 3 | Status: SHIPPED | OUTPATIENT
Start: 2022-06-07

## 2022-06-09 ENCOUNTER — PATIENT MESSAGE (OUTPATIENT)
Dept: INTERNAL MEDICINE CLINIC | Age: 57
End: 2022-06-09

## 2022-06-09 DIAGNOSIS — E53.8 B12 DEFICIENCY: ICD-10-CM

## 2022-06-09 RX ORDER — CYANOCOBALAMIN 1000 UG/ML
1000 INJECTION, SOLUTION INTRAMUSCULAR; SUBCUTANEOUS EVERY 2 WEEKS
Qty: 6 ML | Refills: 1
Start: 2022-06-09

## 2022-06-09 NOTE — TELEPHONE ENCOUNTER
From: Marthenia Denver  To: Kannan Patel MD  Sent: 7/0/6086 2:19 AM EDT  Subject: B-12    I am leaving town Friday early morning for 10-12 days. Is it possible that I could get my B-12 today, the 9th? Not due for refill until Saturday the 11th.     JERI Palencia

## 2022-06-24 ENCOUNTER — OFFICE VISIT (OUTPATIENT)
Dept: INTERNAL MEDICINE CLINIC | Age: 57
End: 2022-06-24
Payer: COMMERCIAL

## 2022-06-24 VITALS
SYSTOLIC BLOOD PRESSURE: 149 MMHG | BODY MASS INDEX: 25.57 KG/M2 | OXYGEN SATURATION: 98 % | RESPIRATION RATE: 16 BRPM | TEMPERATURE: 97.6 F | DIASTOLIC BLOOD PRESSURE: 98 MMHG | HEIGHT: 74 IN | HEART RATE: 66 BPM | WEIGHT: 199.2 LBS

## 2022-06-24 DIAGNOSIS — E11.29 TYPE 2 DIABETES MELLITUS WITH MICROALBUMINURIA, WITH LONG-TERM CURRENT USE OF INSULIN (HCC): ICD-10-CM

## 2022-06-24 DIAGNOSIS — I10 ESSENTIAL HYPERTENSION: ICD-10-CM

## 2022-06-24 DIAGNOSIS — F33.0 MILD EPISODE OF RECURRENT MAJOR DEPRESSIVE DISORDER (HCC): ICD-10-CM

## 2022-06-24 DIAGNOSIS — F41.9 ANXIETY: ICD-10-CM

## 2022-06-24 DIAGNOSIS — R53.83 FATIGUE, UNSPECIFIED TYPE: ICD-10-CM

## 2022-06-24 DIAGNOSIS — R93.2 ABNORMAL LIVER CT: ICD-10-CM

## 2022-06-24 DIAGNOSIS — G25.0 ESSENTIAL TREMOR: ICD-10-CM

## 2022-06-24 DIAGNOSIS — N20.0 RECURRENT NEPHROLITHIASIS: ICD-10-CM

## 2022-06-24 DIAGNOSIS — Z79.4 TYPE 2 DIABETES MELLITUS WITH MICROALBUMINURIA, WITH LONG-TERM CURRENT USE OF INSULIN (HCC): ICD-10-CM

## 2022-06-24 DIAGNOSIS — R80.9 TYPE 2 DIABETES MELLITUS WITH MICROALBUMINURIA, WITH LONG-TERM CURRENT USE OF INSULIN (HCC): ICD-10-CM

## 2022-06-24 PROCEDURE — 99215 OFFICE O/P EST HI 40 MIN: CPT | Performed by: INTERNAL MEDICINE

## 2022-06-24 NOTE — PATIENT INSTRUCTIONS
Start metformin - take 2 tablets once a day for a week then increase to 2 tabs twice a day. After a week on that dose, start wellbutrin. Then send me a message after a month on wellbutrin and we'll decide next steps. If your sugars go above 250, let me know.

## 2022-06-24 NOTE — ASSESSMENT & PLAN NOTE
Discussed restarting primidone at last visit. However, after last visit, he was in the ER for a kidney stone and had issues with keeping medications down. He started feeling better off medications and did not restart primidone  Continue propranolol 80.   Currently off primidone 25, consider adding back later

## 2022-06-24 NOTE — ASSESSMENT & PLAN NOTE
We had discussed starting Jardiance after his last visit. However, since the last visit, he was seen in the ER for kidney stones. Was unable to tolerate his medication so he stopped a lot of his medicines. Since stopping these medicines, he has felt significantly better and had more energy  Low 200s, high 100s when checking   Last A1c 8.9%. Not well controlled but we want to find out which medications are causing the fatigue.   Continue glargine 24 units daily, Trulicity 3 mg once a week  Recommend restarting metformin 1000 twice daily (taper back up)  Currently off glimepiride 2 mg and he has not started Jardiance  Was also on fenofibric acid 135

## 2022-06-24 NOTE — ASSESSMENT & PLAN NOTE
CT done during his ER visit right kidney stone showed mildly nodular liver surface which had been seen previously as well. Per patient, he thinks this has been known before when he was initially diagnosed with sarcoid. He had a liver biopsy and that showed sarcoid  He had been dealing with fatigue but a lot better now off medicines.   Consider liver ultrasound to evaluate further in the future  Fib 4 1.15 using platelet from October (most recent platelet was during ER visit for kidney stone), low probability for advanced fibrosis

## 2022-06-24 NOTE — ASSESSMENT & PLAN NOTE
Elevated today. Recommend monitoring at home. Wrist cuff previously has run 10-15 points higher compared to office.   Continue lisinopril 20 for now but consider increasing if persistently elevated

## 2022-06-24 NOTE — ASSESSMENT & PLAN NOTE
Seen in the emergency room 5/6 for abdominal pain found to have kidney stones  Passed 2 stones after ER visit  Saw urologist - was told it was clear but passed another stone that night   Sent a stone of for analysis - stones are uric acid   Urology planning for 24 urine test and planning to start med for uric acid   Follow-up with urology

## 2022-06-24 NOTE — ASSESSMENT & PLAN NOTE
After last visit, he was seen in the ER for kidney stones. He was unable to tolerate a lot of his medications and when he was off medications, he had significantly increased energy and less fatigue. Discussed medication adjustment. We will try adding back medication slowly and see if we can find the one potentially causing the symptoms.   Medications he had stopped:  wellbutrin - worked well for him, needs to be on something  fenofibric  Glimepiride  Metformin  Primidone  Sertraline  See diabetes and anxiety note

## 2022-06-24 NOTE — ASSESSMENT & PLAN NOTE
since the last visit, he was seen in the ER for kidney stones. Was unable to tolerate his medication so he stopped a lot of his medicines. Since stopping these medicines, he has felt significantly better and had more energy  He stopped his Wellbutrin and Zoloft but is encouraged by his wife to restart something  We discussed restarting medicines he stopped slowly. Starting with metformin. After restarting metformin, recommend restarting Wellbutrin  daily. Evaluate for a month then see if we need to make any further changes.   Previously on Zoloft 50

## 2022-06-24 NOTE — PROGRESS NOTES
Note   Chief Complaint   Med changes    Adelita Dupree is a 65 y.o. male     1. Essential hypertension  Assessment & Plan:  Elevated today. Recommend monitoring at home. Wrist cuff previously has run 10-15 points higher compared to office. Continue lisinopril 20 for now but consider increasing if persistently elevated  2. Essential tremor  Assessment & Plan:  Discussed restarting primidone at last visit. However, after last visit, he was in the ER for a kidney stone and had issues with keeping medications down. He started feeling better off medications and did not restart primidone  Continue propranolol 80. Currently off primidone 25, consider adding back later  3. Fatigue, unspecified type  Assessment & Plan:  After last visit, he was seen in the ER for kidney stones. He was unable to tolerate a lot of his medications and when he was off medications, he had significantly increased energy and less fatigue. Discussed medication adjustment. We will try adding back medication slowly and see if we can find the one potentially causing the symptoms. Medications he had stopped:  wellbutrin - worked well for him, needs to be on something  fenofibric  Glimepiride  Metformin  Primidone  Sertraline  See diabetes and anxiety note  4. Type 2 diabetes mellitus with microalbuminuria, with long-term current use of insulin ([facility] [address]  Assessment & Plan: We had discussed starting Jardiance after his last visit. However, since the last visit, he was seen in the ER for kidney stones. Was unable to tolerate his medication so he stopped a lot of his medicines. Since stopping these medicines, he has felt significantly better and had more energy  Low 200s, high 100s when checking   Last A1c 8.9%. Not well controlled but we want to find out which medications are causing the fatigue.   Continue glargine 24 units daily, Trulicity 3 mg once a week  Recommend restarting metformin 1000 twice daily (taper back up)  Currently off glimepiride 2 mg and he has not started Nguyễn Jose Miguel  Was also on fenofibric acid 135  5. Mild episode of recurrent major depressive disorder [facility])  Assessment & Plan:   since the last visit, he was seen in the ER for kidney stones. Was unable to tolerate his medication so he stopped a lot of his medicines. Since stopping these medicines, he has felt significantly better and had more energy  He stopped his Wellbutrin and Zoloft but is encouraged by his wife to restart something  We discussed restarting medicines he stopped slowly. Starting with metformin. After restarting metformin, recommend restarting Wellbutrin  daily. Evaluate for a month then see if we need to make any further changes. Previously on Zoloft 50  6. Abnormal liver CT  Assessment & Plan:   CT done during his ER visit right kidney stone showed mildly nodular liver surface which had been seen previously as well. Per patient, he thinks this has been known before when he was initially diagnosed with sarcoid. He had a liver biopsy and that showed sarcoid  He had been dealing with fatigue but a lot better now off medicines. Consider liver ultrasound to evaluate further in the future  Fib 4 1.15 using platelet from October (most recent platelet was during ER visit for kidney stone), low probability for advanced fibrosis  7. Anxiety  Assessment & Plan:  Signed controlled substance agreement for xanax  8. Recurrent nephrolithiasis  Assessment & Plan:  Seen in the emergency room 5/6 for abdominal pain found to have kidney stones  Passed 2 stones after ER visit  Saw urologist - was told it was clear but passed another stone that night   Sent a stone of for analysis - stones are uric acid   Urology planning for 24 urine test and planning to start med for uric acid   Follow-up with urology       Benefits, risks, possible drug interactions, and side effects of all new medications were reviewed with the patient. Pt verbalized understanding.     Return to clinic: Pending medication changes. Advised patient to send me a message a month after starting Wellbutrin  4/6 - Step grand daughter born yesterday  chad-tie    An electronic signature was used to authenticate this note. Kannan Patel MD  [facility]  6/24/2022    No future appointments. On this date 06/24/2022 I have spent 41 minutes reviewing previous notes, test results and face to face with the patient discussing the diagnosis and importance of compliance with the treatment plan as well as documenting on the day of the visit. Objective   Vitals:       Visit Vitals  BP (!) 149/98 (BP 1 Location: Left upper arm, BP Patient Position: Sitting, BP Cuff Size: Adult)   Pulse 66   Temp 97.6 °F (36.4 °C) (Oral)   Resp 16   Ht 6' 2\" (1.88 m)   Wt 199 lb 3.2 oz (90.4 kg)   SpO2 98%   BMI 25.58 kg/m²        Physical Exam  Constitutional:       Appearance: Normal appearance. He is not ill-appearing. Cardiovascular:      Rate and Rhythm: Normal rate and regular rhythm. Heart sounds: No murmur heard. No friction rub. No gallop. Pulmonary:      Effort: No respiratory distress. Breath sounds: Normal breath sounds. No wheezing, rhonchi or rales. Neurological:      Mental Status: He is alert. Current Outpatient Medications   Medication Sig    cyanocobalamin (VITAMIN B12) 1,000 mcg/mL injection 1 mL by IntraMUSCular route Once every 2 weeks. Indications: inadequate vitamin B12    Vitamin D3 25 mcg (1,000 unit) tablet TAKE 1 TABLET BY MOUTH EVERY DAY    omeprazole (PRILOSEC) 20 mg capsule Take 1 Capsule by mouth daily. Indications: GERD    insulin glargine (LANTUS,BASAGLAR) 100 unit/mL (3 mL) inpn 30 Units by SubCUTAneous route every evening. Indications: type 2 diabetes mellitus (Patient taking differently: 24 Units by SubCUTAneous route every evening.  Indications: type 2 diabetes mellitus)    dulaglutide (Trulicity) 3 JH/7.1 mL pnij 3 mg by SubCUTAneous route every seven (7) days. Indications: type 2 diabetes mellitus    lisinopriL (PRINIVIL, ZESTRIL) 20 mg tablet Take 1 Tablet by mouth daily. *Please note change in tab strength*  Indications: high blood pressure    Syringe with Needle, Disp, (Syringe 3cc/25Gx1\") 3 mL 25 gauge x 1\" syrg Use every other week with B12 injections    flash glucose sensor (FreeStyle Epifanio 2 Sensor) kit Use to check your blood sugars throughout the day. Submit directly to voucher information below    ALPRAZolam (XANAX) 0.25 mg tablet Take 1 Tablet by mouth two (2) times daily as needed for Anxiety or Sleep. Max Daily Amount: 0.5 mg.    Blood-Glucose Meter monitoring kit Use to check blood sugars twice daily - patient using one touch verio strips but needs new meter    atorvastatin (LIPITOR) 20 mg tablet Take 20 mg by mouth daily.  propranolol LA (INDERAL LA) 80 mg SR capsule Take 1 Capsule by mouth daily. Indications: tremor    lancets (One Touch Delica) 33 gauge misc Use 1-4 times daily to monitor sugar    glucose blood VI test strips (OneTouch Verio test strips) strip Use 3-4 times daily to monitor glucose    ondansetron (ZOFRAN ODT) 4 mg disintegrating tablet Take 1 Tablet by mouth every eight (8) hours as needed for Nausea or Nausea or Vomiting for up to 20 doses.  empagliflozin (JARDIANCE) 10 mg tablet Take 1 Tablet by mouth daily. Indications: type 2 diabetes mellitus    metFORMIN ER (GLUCOPHAGE XR) 500 mg tablet Take 2 Tablets by mouth two (2) times a day. Indications: type 2 diabetes mellitus    buPROPion XL (Wellbutrin XL) 300 mg XL tablet Take 1 Tab by mouth every morning. Indications: anxiety     No current facility-administered medications for this visit.

## 2022-07-23 DIAGNOSIS — E11.29 TYPE 2 DIABETES MELLITUS WITH MICROALBUMINURIA, WITH LONG-TERM CURRENT USE OF INSULIN (HCC): ICD-10-CM

## 2022-07-23 DIAGNOSIS — R80.9 TYPE 2 DIABETES MELLITUS WITH MICROALBUMINURIA, WITH LONG-TERM CURRENT USE OF INSULIN (HCC): ICD-10-CM

## 2022-07-23 DIAGNOSIS — Z79.4 TYPE 2 DIABETES MELLITUS WITH MICROALBUMINURIA, WITH LONG-TERM CURRENT USE OF INSULIN (HCC): ICD-10-CM

## 2022-07-24 DIAGNOSIS — F41.9 ANXIETY: ICD-10-CM

## 2022-07-25 RX ORDER — EMPAGLIFLOZIN 10 MG/1
TABLET, FILM COATED ORAL
Qty: 30 TABLET | Refills: 1 | Status: SHIPPED | OUTPATIENT
Start: 2022-07-25

## 2022-07-25 RX ORDER — METFORMIN HYDROCHLORIDE 500 MG/1
1000 TABLET, EXTENDED RELEASE ORAL 2 TIMES DAILY
Qty: 360 TABLET | Refills: 1 | Status: SHIPPED | OUTPATIENT
Start: 2022-07-25

## 2022-07-25 RX ORDER — BUPROPION HYDROCHLORIDE 300 MG/1
300 TABLET ORAL
Qty: 90 TABLET | Refills: 3 | Status: SHIPPED | OUTPATIENT
Start: 2022-07-25

## 2022-08-07 DIAGNOSIS — Z79.4 TYPE 2 DIABETES MELLITUS WITH MICROALBUMINURIA, WITH LONG-TERM CURRENT USE OF INSULIN (HCC): ICD-10-CM

## 2022-08-07 DIAGNOSIS — R80.9 TYPE 2 DIABETES MELLITUS WITH MICROALBUMINURIA, WITH LONG-TERM CURRENT USE OF INSULIN (HCC): ICD-10-CM

## 2022-08-07 DIAGNOSIS — F41.9 ANXIETY: ICD-10-CM

## 2022-08-07 DIAGNOSIS — E11.29 TYPE 2 DIABETES MELLITUS WITH MICROALBUMINURIA, WITH LONG-TERM CURRENT USE OF INSULIN (HCC): ICD-10-CM

## 2022-08-08 RX ORDER — ALPRAZOLAM 0.25 MG/1
0.25 TABLET ORAL
Qty: 60 TABLET | Refills: 0 | Status: SHIPPED | OUTPATIENT
Start: 2022-08-08

## 2022-08-08 RX ORDER — DULAGLUTIDE 3 MG/.5ML
INJECTION, SOLUTION SUBCUTANEOUS
Qty: 12 EACH | Refills: 1 | Status: SHIPPED | OUTPATIENT
Start: 2022-08-08

## 2022-09-04 DIAGNOSIS — R80.9 TYPE 2 DIABETES MELLITUS WITH MICROALBUMINURIA, WITH LONG-TERM CURRENT USE OF INSULIN (HCC): ICD-10-CM

## 2022-09-04 DIAGNOSIS — E11.29 TYPE 2 DIABETES MELLITUS WITH MICROALBUMINURIA, WITH LONG-TERM CURRENT USE OF INSULIN (HCC): ICD-10-CM

## 2022-09-04 DIAGNOSIS — Z79.4 TYPE 2 DIABETES MELLITUS WITH MICROALBUMINURIA, WITH LONG-TERM CURRENT USE OF INSULIN (HCC): ICD-10-CM

## 2022-09-06 RX ORDER — INSULIN GLARGINE 100 [IU]/ML
24 INJECTION, SOLUTION SUBCUTANEOUS EVERY EVENING
Qty: 21.6 ML | Refills: 1 | Status: SHIPPED | OUTPATIENT
Start: 2022-09-06

## 2022-09-13 ENCOUNTER — OFFICE VISIT (OUTPATIENT)
Dept: INTERNAL MEDICINE CLINIC | Age: 57
End: 2022-09-13
Payer: COMMERCIAL

## 2022-09-13 VITALS
TEMPERATURE: 97.5 F | HEIGHT: 74 IN | DIASTOLIC BLOOD PRESSURE: 89 MMHG | RESPIRATION RATE: 16 BRPM | WEIGHT: 196 LBS | OXYGEN SATURATION: 97 % | HEART RATE: 83 BPM | BODY MASS INDEX: 25.15 KG/M2 | SYSTOLIC BLOOD PRESSURE: 137 MMHG

## 2022-09-13 DIAGNOSIS — Z79.4 TYPE 2 DIABETES MELLITUS WITH MICROALBUMINURIA, WITH LONG-TERM CURRENT USE OF INSULIN (HCC): Primary | ICD-10-CM

## 2022-09-13 DIAGNOSIS — I10 ESSENTIAL HYPERTENSION: ICD-10-CM

## 2022-09-13 DIAGNOSIS — F33.0 MILD EPISODE OF RECURRENT MAJOR DEPRESSIVE DISORDER (HCC): ICD-10-CM

## 2022-09-13 DIAGNOSIS — G25.0 ESSENTIAL TREMOR: ICD-10-CM

## 2022-09-13 DIAGNOSIS — N20.0 RECURRENT NEPHROLITHIASIS: ICD-10-CM

## 2022-09-13 DIAGNOSIS — R11.0 NAUSEA: ICD-10-CM

## 2022-09-13 DIAGNOSIS — E11.29 TYPE 2 DIABETES MELLITUS WITH MICROALBUMINURIA, WITH LONG-TERM CURRENT USE OF INSULIN (HCC): Primary | ICD-10-CM

## 2022-09-13 DIAGNOSIS — R80.9 TYPE 2 DIABETES MELLITUS WITH MICROALBUMINURIA, WITH LONG-TERM CURRENT USE OF INSULIN (HCC): Primary | ICD-10-CM

## 2022-09-13 DIAGNOSIS — R53.83 FATIGUE, UNSPECIFIED TYPE: ICD-10-CM

## 2022-09-13 PROCEDURE — 99215 OFFICE O/P EST HI 40 MIN: CPT | Performed by: INTERNAL MEDICINE

## 2022-09-13 RX ORDER — ONDANSETRON 4 MG/1
4 TABLET, ORALLY DISINTEGRATING ORAL
Qty: 30 TABLET | Refills: 5 | Status: SHIPPED | OUTPATIENT
Start: 2022-09-13

## 2022-09-13 NOTE — ASSESSMENT & PLAN NOTE
wellbutrin started after last visit   Continue wellbutrin  daily. Has not yet restarted zoloft 50. Increased fatigue after last visit, so if his fatigue is medication related, it's unlikely zoloft.  Consider adding in the future

## 2022-09-13 NOTE — PROGRESS NOTES
Note   Chief Complaint   Med follow up    Peg Main is a 65 y.o. male     1. Type 2 diabetes mellitus with microalbuminuria, with long-term current use of insulin ([facility])  Assessment & Plan:  Discussed med changes last visit - fatigue and nausea possibly medication side effect. Was feeling better of meds but now feeling tired and nauseated again  Medications after last visist were metformin, wellbutrin, and glimepiride. Also potentially started fenofibrate (he's not sure if he actually started it)  Glucose at home ranging mostly around 1  but did have some low readings (as low as 95). Wonder if related to metformin. Recommend stopping. Check sugar for the next week and log food. Consider increasing glargine if needed while we figure out what medications to restart. Continue glargine 24, Trulicity 3, glimepiride 2. He was also previously on Jardiance  He also restarted his fenofibrate (he thinks)  Will look into getting him a dexcom  2. Nausea  -     ondansetron (ZOFRAN ODT) 4 mg disintegrating tablet; Take 1 Tablet by mouth every eight (8) hours as needed for Nausea or Nausea or Vomiting., Normal, Disp-30 Tablet, R-5  3. Essential hypertension  Assessment & Plan:   well controlled, continue current medications   Lisinopril 20  4. Fatigue, unspecified type  Assessment & Plan:   Increased fatigue again since last visit. Restarted his wellbutrin, metformin, glimepiride, and possibly fenofibrate (has not restarted primidone and sertraline)    Suspect possibly metformin - rec stopping. Send message in 1 week with how he is doing  Unlikely to be primidone, jardiance, zoloft because fatigue still even though not taking; glimepiride, fenofibrate, metformin, and wellbutrin were restarted after last visit  Also, not sure if patient has been on atorvastatin?   5. Mild episode of recurrent major depressive disorder ([facility] [address]  Assessment & Plan:   wellbutrin started after last visit   Continue wellbutrin  daily. Has not yet restarted zoloft 50. Increased fatigue after last visit, so if his fatigue is medication related, it's unlikely zoloft. Consider adding in the future  6. Recurrent nephrolithiasis  Assessment & Plan:   Was given potassium to take by his urologist but it is causing nausea, GI upset  Advised to discuss with his urologist to see if there is another option for formulation  7. Essential tremor  Assessment & Plan:  Pt added back some medicines after last visit. Has had increased fatigue. Interested in restarting primidone  To help try to pinpoint a possible medication side effect, rec making only one change a week. However, fatigue still there despite being off primidone, so may consider restarting in the future     Benefits, risks, possible drug interactions, and side effects of all new medications were reviewed with the patient. Pt verbalized understanding. Return to clinic:  1 month for med follow up, advised to send message in 1 week  4/6 - Step grand daughter born yesterday  chad-tie    An electronic signature was used to authenticate this note. Hossein Alamo MD  [facility]  9/13/2022    No future appointments. On this date 09/13/2022 I have spent 42 minutes reviewing previous notes, test results and face to face with the patient discussing the diagnosis and importance of compliance with the treatment plan as well as documenting on the day of the visit. Objective   Vitals:       Visit Vitals  /89 (BP 1 Location: Left upper arm, BP Patient Position: Sitting, BP Cuff Size: Large adult)   Pulse 83   Temp 97.5 °F (36.4 °C) (Temporal)   Resp 16   Ht 6' 2\" (1.88 m)   Wt 196 lb (88.9 kg)   SpO2 97%   BMI 25.16 kg/m²        Physical Exam  Constitutional:       Appearance: Normal appearance. He is not ill-appearing. Pulmonary:      Effort: No respiratory distress. Neurological:      Mental Status: He is alert.         Current Outpatient Medications   Medication Sig    ondansetron (ZOFRAN ODT) 4 mg disintegrating tablet Take 1 Tablet by mouth every eight (8) hours as needed for Nausea or Nausea or Vomiting. insulin glargine (Lantus Solostar U-100 Insulin) 100 unit/mL (3 mL) inpn 24 Units by SubCUTAneous route every evening. Indications: type 2 diabetes mellitus    dulaglutide (Trulicity) 3 WD/6.9 mL pnij INJECT 3MG UNDER THE SKIN EVERY 7 DAYS    ALPRAZolam (XANAX) 0.25 mg tablet TAKE 1 TABLET BY MOUTH TWO (2) TIMES DAILY AS NEEDED FOR ANXIETY OR SLEEP. MAX DAILY AMOUNT: 0.5 MG.    metFORMIN ER (GLUCOPHAGE XR) 500 mg tablet TAKE 2 TABLETS BY MOUTH TWO (2) TIMES A DAY. INDICATIONS: TYPE 2 DIABETES MELLITUS    buPROPion XL (Wellbutrin XL) 300 mg XL tablet Take 1 Tablet by mouth every morning. Indications: anxiety    cyanocobalamin (VITAMIN B12) 1,000 mcg/mL injection 1 mL by IntraMUSCular route Once every 2 weeks. Indications: inadequate vitamin B12    omeprazole (PRILOSEC) 20 mg capsule Take 1 Capsule by mouth daily. Indications: GERD    lisinopriL (PRINIVIL, ZESTRIL) 20 mg tablet Take 1 Tablet by mouth daily. *Please note change in tab strength*  Indications: high blood pressure    Syringe with Needle, Disp, (Syringe 3cc/25Gx1\") 3 mL 25 gauge x 1\" syrg Use every other week with B12 injections    Blood-Glucose Meter monitoring kit Use to check blood sugars twice daily - patient using one touch verio strips but needs new meter    atorvastatin (LIPITOR) 20 mg tablet Take 20 mg by mouth daily. propranolol LA (INDERAL LA) 80 mg SR capsule Take 1 Capsule by mouth daily. Indications: tremor    lancets (One Touch Delica) 33 gauge misc Use 1-4 times daily to monitor sugar    glucose blood VI test strips (OneTouch Verio test strips) strip Use 3-4 times daily to monitor glucose    Jardiance 10 mg tablet TAKE 1 TABLET BY MOUTH DAILY.  INDICATIONS: TYPE 2 DIABETES MELLITUS (Patient not taking: Reported on 9/13/2022)    Vitamin D3 25 mcg (1,000 unit) tablet TAKE 1 TABLET BY MOUTH EVERY DAY (Patient not taking: Reported on 9/13/2022)    flash glucose sensor (FreeStyle Epifanio 2 Sensor) kit Use to check your blood sugars throughout the day. Submit directly to voucher information below (Patient not taking: Reported on 9/13/2022)     No current facility-administered medications for this visit.

## 2022-09-13 NOTE — ASSESSMENT & PLAN NOTE
Increased fatigue again since last visit. Restarted his wellbutrin, metformin, glimepiride, and possibly fenofibrate (has not restarted primidone and sertraline)    Suspect possibly metformin - rec stopping. Send message in 1 week with how he is doing  Unlikely to be primidone, jardiance, zoloft because fatigue still even though not taking; glimepiride, fenofibrate, metformin, and wellbutrin were restarted after last visit  Also, not sure if patient has been on atorvastatin?

## 2022-09-13 NOTE — ASSESSMENT & PLAN NOTE
Pt added back some medicines after last visit. Has had increased fatigue. Interested in restarting primidone  To help try to pinpoint a possible medication side effect, rec making only one change a week.   However, fatigue still there despite being off primidone, so may consider restarting in the future

## 2022-09-13 NOTE — PROGRESS NOTES
Identified pt with two pt identifiers. Reviewed record in preparation for visit and have obtained necessary documentation. All patient medications has been reviewed. Chief Complaint   Patient presents with    Follow-up    Diabetes    Hypertension     Additional information about chief complaint:    Visit Vitals  /89 (BP 1 Location: Left upper arm, BP Patient Position: Sitting, BP Cuff Size: Large adult)   Pulse 83   Temp 97.5 °F (36.4 °C) (Temporal)   Resp 16   Ht 6' 2\" (1.88 m)   Wt 196 lb (88.9 kg)   SpO2 97%   BMI 25.16 kg/m²       Health Maintenance Due   Topic    COVID-19 Vaccine (1)    Pneumococcal 0-64 years (1 - PCV)    Foot Exam Q1     DTaP/Tdap/Td series (1 - Tdap)    Colorectal Cancer Screening Combo     Shingrix Vaccine Age 50> (1 of 2)    MICROALBUMIN Q1     Lipid Screen     Flu Vaccine (1)       1. Have you been to the ER, urgent care clinic since your last visit? Hospitalized since your last visit? Yes seen at [facility] ED for a kidney stone last month. 2. Have you seen or consulted any other health care providers outside of the [address] [place] [address] since your last visit? Include any pap smears or colon screening.  Nl

## 2022-09-13 NOTE — ASSESSMENT & PLAN NOTE
Was given potassium to take by his urologist but it is causing nausea, GI upset  Advised to discuss with his urologist to see if there is another option for formulation

## 2022-09-13 NOTE — ASSESSMENT & PLAN NOTE
Discussed med changes last visit - fatigue and nausea possibly medication side effect. Was feeling better of meds but now feeling tired and nauseated again  Medications after last visist were metformin, wellbutrin, and glimepiride. Also potentially started fenofibrate (he's not sure if he actually started it)  Glucose at home ranging mostly around 1  but did have some low readings (as low as 95). Wonder if related to metformin. Recommend stopping. Check sugar for the next week and log food. Consider increasing glargine if needed while we figure out what medications to restart. Continue glargine 24, Trulicity 3, glimepiride 2.   He was also previously on Jardiance  He also restarted his fenofibrate (he thinks)  Will look into getting him a dexcom

## 2022-09-28 ENCOUNTER — DOCUMENTATION ONLY (OUTPATIENT)
Dept: INTERNAL MEDICINE CLINIC | Age: 57
End: 2022-09-28

## 2022-09-28 NOTE — PROGRESS NOTES
PA completed by nurse via covermymeds has been approve by patient insurance. Please see below for approval dates and information.   [email or web address]  Your PA request has been approved  Drug  Omeprazole 20MG dr capsules

## 2022-09-28 NOTE — PROGRESS NOTES
PA completed by nurse via covermymeds. PA pending insurance approval please see below for PA key.   Key: [id number] - Rx #: [id number]  Status  Sent to [facility]  Drug  Omeprazole 20MG dr capsules

## 2022-10-03 ENCOUNTER — TELEPHONE (OUTPATIENT)
Dept: INTERNAL MEDICINE CLINIC | Age: 57
End: 2022-10-03

## 2022-10-03 NOTE — TELEPHONE ENCOUNTER
I have attempted to contact this patient by phone, no answer. Nurse could not lvm d/t voicemail full.  Graze message sent to patient

## 2022-10-03 NOTE — TELEPHONE ENCOUNTER
----- Message from Lawrence Sandhoff, MD sent at 9/64/0330  4:55 PM EDT -----  Regarding: sugar log  Please call pt - was supposed to send us sugar readings

## 2022-11-02 ENCOUNTER — TELEPHONE (OUTPATIENT)
Dept: INTERNAL MEDICINE CLINIC | Age: 57
End: 2022-11-02

## 2022-11-02 NOTE — TELEPHONE ENCOUNTER
Patient has severe ear pain and would like to know if he can be seen today by his PCP.     PSR informed patient he would ask the nurse and has scheduled patient in the meantime to be seen Monday by the NP.

## 2022-11-07 ENCOUNTER — OFFICE VISIT (OUTPATIENT)
Dept: INTERNAL MEDICINE CLINIC | Age: 57
End: 2022-11-07
Payer: COMMERCIAL

## 2022-11-07 VITALS
SYSTOLIC BLOOD PRESSURE: 125 MMHG | OXYGEN SATURATION: 99 % | HEART RATE: 114 BPM | TEMPERATURE: 98 F | BODY MASS INDEX: 25.41 KG/M2 | DIASTOLIC BLOOD PRESSURE: 89 MMHG | WEIGHT: 198 LBS | HEIGHT: 74 IN | RESPIRATION RATE: 17 BRPM

## 2022-11-07 DIAGNOSIS — Z79.4 TYPE 2 DIABETES MELLITUS WITH MICROALBUMINURIA, WITH LONG-TERM CURRENT USE OF INSULIN (HCC): ICD-10-CM

## 2022-11-07 DIAGNOSIS — E11.29 TYPE 2 DIABETES MELLITUS WITH MICROALBUMINURIA, WITH LONG-TERM CURRENT USE OF INSULIN (HCC): ICD-10-CM

## 2022-11-07 DIAGNOSIS — R80.9 TYPE 2 DIABETES MELLITUS WITH MICROALBUMINURIA, WITH LONG-TERM CURRENT USE OF INSULIN (HCC): ICD-10-CM

## 2022-11-07 DIAGNOSIS — K21.9 GASTROESOPHAGEAL REFLUX DISEASE WITHOUT ESOPHAGITIS: ICD-10-CM

## 2022-11-07 DIAGNOSIS — H61.22 IMPACTED CERUMEN OF LEFT EAR: Primary | ICD-10-CM

## 2022-11-07 PROCEDURE — 69209 REMOVE IMPACTED EAR WAX UNI: CPT | Performed by: NURSE PRACTITIONER

## 2022-11-07 PROCEDURE — 99213 OFFICE O/P EST LOW 20 MIN: CPT | Performed by: NURSE PRACTITIONER

## 2022-11-07 RX ORDER — ATORVASTATIN CALCIUM 20 MG/1
20 TABLET, FILM COATED ORAL DAILY
Qty: 30 TABLET | Refills: 2 | Status: CANCELLED | OUTPATIENT
Start: 2022-11-07

## 2022-11-07 RX ORDER — OMEPRAZOLE 20 MG/1
20 CAPSULE, DELAYED RELEASE ORAL DAILY
Qty: 30 CAPSULE | Refills: 5 | Status: SHIPPED | OUTPATIENT
Start: 2022-11-07

## 2022-11-07 NOTE — PROGRESS NOTES
Room:  Identified pt with two pt identifiers(name and ). Reviewed record in preparation for visit and have obtained necessary documentation. Chief Complaint   Patient presents with    Ear Pain        Vitals:    22 1037   BP: 125/89   Pulse: (!) 120   Resp: 17   Temp: 98 °F (36.7 °C)   TempSrc: Oral   SpO2: 99%   Weight: 198 lb (89.8 kg)   Height: 6' 2\" (1.88 m)   PainSc:   0 - No pain       Health Maintenance Due   Topic    COVID-19 Vaccine (1)    Pneumococcal 0-64 years (1 - PCV)    Foot Exam Q1     Hepatitis B Vaccine (1 of 3 - Risk 3-dose series)    DTaP/Tdap/Td series (1 - Tdap)    Colorectal Cancer Screening Combo     Shingrix Vaccine Age 50> (1 of 2)    MICROALBUMIN Q1     Lipid Screen     Flu Vaccine (1)       1. \"Have you been to the ER, urgent care clinic since your last visit? Hospitalized since your last visit? \" No    2. \"Have you seen or consulted any other health care providers outside of the [address] [place] [address] since your last visit? \" No     3. For patients over 45: Has the patient had a colonoscopy? No     If the patient is female:    4. For patients over 36: Has the patient had a mammogram? NA - based on age    11. For patients over 21: Has the patient had a pap smear? NA - based on age    Current Outpatient Medications   Medication Instructions    ALPRAZolam (XANAX) 0.25 mg, Oral, 2 TIMES DAILY AS NEEDED    atorvastatin (LIPITOR) 20 mg, Oral, DAILY    Blood-Glucose Meter monitoring kit Use to check blood sugars twice daily - patient using one touch verio strips but needs new meter    buPROPion XL (WELLBUTRIN XL) 300 mg, Oral, 7AM    cyanocobalamin (VITAMIN B12) 1,000 mcg, IntraMUSCular, EVERY 2 WEEKS    dulaglutide (Trulicity) 3 BU/3.1 mL pnij INJECT 3MG UNDER THE SKIN EVERY 7 DAYS    flash glucose sensor (FreeStyle Epifanio 2 Sensor) kit Use to check your blood sugars throughout the day.  Submit directly to voucher information below    glucose blood VI test strips (OneTouch Verio test strips) strip Use 3-4 times daily to monitor glucose    insulin glargine (LANTUS SOLOSTAR U-100 INSULIN) 24 Units, SubCUTAneous, EVERY EVENING    Jardiance 10 mg tablet TAKE 1 TABLET BY MOUTH DAILY. INDICATIONS: TYPE 2 DIABETES MELLITUS    lancets (One Touch Delica) 33 gauge misc Use 1-4 times daily to monitor sugar    lisinopriL (PRINIVIL, ZESTRIL) 20 mg, Oral, DAILY, *Please note change in tab strength*    metFORMIN ER (GLUCOPHAGE XR) 1,000 mg, Oral, 2 TIMES DAILY    omeprazole (PRILOSEC) 20 mg, Oral, DAILY    ondansetron (ZOFRAN ODT) 4 mg, Oral, EVERY 8 HOURS AS NEEDED    propranolol LA (INDERAL LA) 80 mg, Oral, DAILY    Syringe with Needle, Disp, (Syringe 3cc/25Gx1\") 3 mL 25 gauge x 1\" syrg Use every other week with B12 injections    Vitamin D3 25 mcg (1,000 unit) tablet TAKE 1 TABLET BY MOUTH EVERY DAY       No Known Allergies      There is no immunization history on file for this patient.     Past Medical History:   Diagnosis Date    Cervical stenosis of spinal canal 5/13/2016    Diabetes ([facility] [address]     Hypertension

## 2022-11-07 NOTE — PROGRESS NOTES
Princess Beck (: 1965) is a 65 y.o. male, established patient, here for evaluation of the following chief complaint(s):  Ear Pain       ASSESSMENT/PLAN:  Below is the assessment and plan developed based on review of pertinent history, physical exam, labs, studies, and medications. 1. Impacted cerumen of left ear  -     REMOVAL IMPACTED CERUMEN IRRIGATION/LVG UNILAT    2. Type 2 diabetes mellitus with microalbuminuria, with long-term current use of insulin ([facility] -- he will return to office for his routine follow up with PCP and bring records of blood sugars with him to that appointment. 3. GERD -- refill for Omeprazole 20 mg 1 tab daily #30 with 5 RF sent to pharmacy       No follow-ups on file. SUBJECTIVE/OBJECTIVE:  HPI    Patient of Dr Luis F Coto presents with complaints of possible ear infection to left ear; reports that he was seen by Hearing aid specialist on 11/3 who told him that he had a severe infection and needed to see his PCP for evaluation. Has not had pain in either ear. Has bilateral hearing aids. Denies fever, chills, dizziness. Requesting refill of Omeprazole 20 mg daily for control of GERD symptoms. Reports that his pharmacy has not been filling prescriptions correctly and his insurance does not allow 90 day supply.     Patient Active Problem List   Diagnosis Code    Cervical stenosis of spinal canal M48.02    Sarcoidosis D86.9    Acute hyperkalemia E87.5    Recurrent nephrolithiasis N20.0    Type 2 diabetes mellitus with microalbuminuria, with long-term current use of insulin ([facility]) E11.29, R80.9, Z79.4    Essential tremor G25.0    Anxiety F41.9    Hyperlipidemia, unspecified hyperlipidemia type E78.5    Attention deficit disorder, unspecified hyperactivity presence F98.8    Gastroesophageal reflux disease without esophagitis K21.9    Essential hypertension I10    Neuropathy G62.9    Fatigue R53.83    Memory loss R41.3    Mild episode of recurrent major depressive disorder ([facility] [address] F33.0    Skin cyst L72.9    B12 deficiency E53.8    Monoclonal gammopathy D47.2    Hyponatremia E87.1    Abnormal liver CT R93.2     Past Surgical History:   Procedure Laterality Date    HX CERVICAL FUSION      HX ORTHOPAEDIC      multi lumbar lami/fusions    HX TONSILLECTOMY      HX UROLOGICAL      Kidney Stones \"vacuumed\"    [place] ABDOMEN SURGERY PROC UNLISTED  2013    liver biopsy     Social History     Socioeconomic History    Marital status:      Spouse name: Not on file    Number of children: Not on file    Years of education: Not on file    Highest education level: Not on file   Occupational History    Not on file   Tobacco Use    Smoking status: Former    Smokeless tobacco: Never    Tobacco comments:     occl cigar 5-6x/year   Vaping Use    Vaping Use: Never used   Substance and Sexual Activity    Alcohol use: Not Currently     Comment: a few times a year    Drug use: Yes     Types: Marijuana     Comment: hx marijuana    Sexual activity: Not on file   Other Topics Concern    Not on file   Social History Narrative    Not on file     Social Determinants of Health     Financial Resource Strain: Not on file   Food Insecurity: Not on file   Transportation Needs: Not on file   Physical Activity: Not on file   Stress: Not on file   Social Connections: Not on file   Intimate Partner Violence: Not on file   Housing Stability: Not on file     Family History   Problem Relation Age of Onset    Dementia Mother     Heart Attack Father     Cancer Neg Hx      Current Outpatient Medications   Medication Sig    omeprazole (PRILOSEC) 20 mg capsule Take 1 Capsule by mouth daily. Indications: GERD    ondansetron (ZOFRAN ODT) 4 mg disintegrating tablet Take 1 Tablet by mouth every eight (8) hours as needed for Nausea or Nausea or Vomiting.  insulin glargine (Lantus Solostar U-100 Insulin) 100 unit/mL (3 mL) inpn 24 Units by SubCUTAneous route every evening. Indications: type 2 diabetes mellitus    dulaglutide (Trulicity) 3 NM/2.4 mL pnij INJECT 3MG UNDER THE SKIN EVERY 7 DAYS    ALPRAZolam (XANAX) 0.25 mg tablet TAKE 1 TABLET BY MOUTH TWO (2) TIMES DAILY AS NEEDED FOR ANXIETY OR SLEEP. MAX DAILY AMOUNT: 0.5 MG.  metFORMIN ER (GLUCOPHAGE XR) 500 mg tablet TAKE 2 TABLETS BY MOUTH TWO (2) TIMES A DAY. INDICATIONS: TYPE 2 DIABETES MELLITUS    buPROPion XL (Wellbutrin XL) 300 mg XL tablet Take 1 Tablet by mouth every morning. Indications: anxiety    cyanocobalamin (VITAMIN B12) 1,000 mcg/mL injection 1 mL by IntraMUSCular route Once every 2 weeks. Indications: inadequate vitamin B12    lisinopriL (PRINIVIL, ZESTRIL) 20 mg tablet Take 1 Tablet by mouth daily. *Please note change in tab strength*  Indications: high blood pressure    Syringe with Needle, Disp, (Syringe 3cc/25Gx1\") 3 mL 25 gauge x 1\" syrg Use every other week with B12 injections    Blood-Glucose Meter monitoring kit Use to check blood sugars twice daily - patient using one touch verio strips but needs new meter    atorvastatin (LIPITOR) 20 mg tablet Take 20 mg by mouth daily.  propranolol LA (INDERAL LA) 80 mg SR capsule Take 1 Capsule by mouth daily. Indications: tremor    lancets (One Touch Delica) 33 gauge misc Use 1-4 times daily to monitor sugar    glucose blood VI test strips (OneTouch Verio test strips) strip Use 3-4 times daily to monitor glucose    Jardiance 10 mg tablet TAKE 1 TABLET BY MOUTH DAILY. INDICATIONS: TYPE 2 DIABETES MELLITUS (Patient not taking: No sig reported)    Vitamin D3 25 mcg (1,000 unit) tablet TAKE 1 TABLET BY MOUTH EVERY DAY (Patient not taking: No sig reported)    flash glucose sensor (FreeStyle Epifanio 2 Sensor) kit Use to check your blood sugars throughout the day. Submit directly to voucher information below (Patient not taking: No sig reported)     No current facility-administered medications for this visit.      No Known Allergies    There is no immunization history on file for this patient. Review of Systems   Constitutional:  Negative for chills and fever. HENT:  Positive for hearing loss. Negative for ear pain. Respiratory:  Negative for cough and shortness of breath. Cardiovascular:  Negative for chest pain. Neurological:  Negative for dizziness and headaches. Physical Exam  Vitals and nursing note reviewed. Constitutional:       General: He is not in acute distress. Appearance: Normal appearance. HENT:      Head: Normocephalic and atraumatic. Right Ear: Tympanic membrane, ear canal and external ear normal.      Left Ear: Tympanic membrane and ear canal normal. There is impacted cerumen. Nose: Nose normal.      Mouth/Throat:      Mouth: Mucous membranes are moist.      Pharynx: Oropharynx is clear. Cardiovascular:      Rate and Rhythm: Regular rhythm. Tachycardia present. Pulmonary:      Effort: Pulmonary effort is normal.      Breath sounds: Normal breath sounds. Neurological:      General: No focal deficit present. Mental Status: He is alert and oriented to person, place, and time. Psychiatric:         Mood and Affect: Mood is anxious. An electronic signature was used to authenticate this note.   -- Edita Sagastume NP

## 2022-11-22 ENCOUNTER — OFFICE VISIT (OUTPATIENT)
Dept: INTERNAL MEDICINE CLINIC | Age: 57
End: 2022-11-22
Payer: COMMERCIAL

## 2022-11-22 VITALS
WEIGHT: 200.6 LBS | BODY MASS INDEX: 25.74 KG/M2 | TEMPERATURE: 98 F | DIASTOLIC BLOOD PRESSURE: 88 MMHG | OXYGEN SATURATION: 98 % | SYSTOLIC BLOOD PRESSURE: 132 MMHG | RESPIRATION RATE: 14 BRPM | HEIGHT: 74 IN | HEART RATE: 106 BPM

## 2022-11-22 DIAGNOSIS — F33.0 MILD EPISODE OF RECURRENT MAJOR DEPRESSIVE DISORDER (HCC): ICD-10-CM

## 2022-11-22 DIAGNOSIS — M79.672 PAIN IN BOTH FEET: ICD-10-CM

## 2022-11-22 DIAGNOSIS — G25.0 ESSENTIAL TREMOR: ICD-10-CM

## 2022-11-22 DIAGNOSIS — M79.671 PAIN IN BOTH FEET: ICD-10-CM

## 2022-11-22 DIAGNOSIS — Z79.4 TYPE 2 DIABETES MELLITUS WITH MICROALBUMINURIA, WITH LONG-TERM CURRENT USE OF INSULIN (HCC): Primary | ICD-10-CM

## 2022-11-22 DIAGNOSIS — E78.5 HYPERLIPIDEMIA, UNSPECIFIED HYPERLIPIDEMIA TYPE: ICD-10-CM

## 2022-11-22 DIAGNOSIS — R53.83 FATIGUE, UNSPECIFIED TYPE: ICD-10-CM

## 2022-11-22 DIAGNOSIS — R80.9 TYPE 2 DIABETES MELLITUS WITH MICROALBUMINURIA, WITH LONG-TERM CURRENT USE OF INSULIN (HCC): Primary | ICD-10-CM

## 2022-11-22 DIAGNOSIS — E11.29 TYPE 2 DIABETES MELLITUS WITH MICROALBUMINURIA, WITH LONG-TERM CURRENT USE OF INSULIN (HCC): Primary | ICD-10-CM

## 2022-11-22 LAB — HBA1C MFR BLD HPLC: 9.3 %

## 2022-11-22 PROCEDURE — 99215 OFFICE O/P EST HI 40 MIN: CPT | Performed by: INTERNAL MEDICINE

## 2022-11-22 PROCEDURE — 83036 HEMOGLOBIN GLYCOSYLATED A1C: CPT | Performed by: INTERNAL MEDICINE

## 2022-11-22 RX ORDER — INSULIN LISPRO 100 [IU]/ML
4 INJECTION, SOLUTION INTRAVENOUS; SUBCUTANEOUS
Qty: 1.2 ML | Refills: 1 | Status: SHIPPED | OUTPATIENT
Start: 2022-11-22

## 2022-11-22 RX ORDER — ATORVASTATIN CALCIUM 20 MG/1
20 TABLET, FILM COATED ORAL DAILY
Qty: 90 TABLET | Refills: 1 | Status: SHIPPED | OUTPATIENT
Start: 2022-11-22

## 2022-11-22 RX ORDER — INSULIN GLARGINE 100 [IU]/ML
28 INJECTION, SOLUTION SUBCUTANEOUS EVERY EVENING
Qty: 25.2 ML | Refills: 1 | Status: SHIPPED | OUTPATIENT
Start: 2022-11-22

## 2022-11-22 RX ORDER — GLIMEPIRIDE 2 MG/1
TABLET ORAL
COMMUNITY

## 2022-11-22 RX ORDER — FLASH GLUCOSE SENSOR
KIT MISCELLANEOUS
Qty: 2 KIT | Refills: 5 | Status: SHIPPED | OUTPATIENT
Start: 2022-11-22

## 2022-11-22 NOTE — PROGRESS NOTES
Note   Chief Complaint   Diabetes    Jenny Poole is a 65 y.o. male     1. Type 2 diabetes mellitus with microalbuminuria, with long-term current use of insulin ([facility])  Assessment & Plan:  A1c 9.3% today  Readings 160-170s throughout the day   None below 70  Still having some issues with fatigue but not as bad   Nausea not bad  Off metformin   Diet not good, picky eater, high carb - bread, potatoes, does not plan on changing diet  Not well controlled. Increase Basaglar to 28, start dinner lispro 4 units (hopeful with meal time insulin he will get freestyle which he thinks will help give him better feedback regarding his eating and sugars). Continue Trulicity 3, glimepiride 2. Resent freestyle. Follow-up with [place]  Also previously on Jardiance, decided to start insulin lispro instead  Previous medications: Metformin (may be fatigue)  Orders:  -     AMB POC HEMOGLOBIN A1C  -     flash glucose sensor (FreeStyle Epifanio 2 Sensor) kit; Use to check your blood sugars throughout the day., Normal, Disp-2 Kit, R-5  -     insulin lispro (HUMALOG) 100 unit/mL kwikpen; 4 Units by SubCUTAneous route Daily (before dinner). Indications: type 2 diabetes mellitus, Normal, Disp-1.2 mL, R-1  -     insulin glargine (Lantus Solostar U-100 Insulin) 100 unit/mL (3 mL) inpn; 28 Units by SubCUTAneous route every evening. Indications: type 2 diabetes mellitus, Normal, Disp-25.2 mL, R-1  2. Pain in both feet  Assessment & Plan:  Patient requesting referral to podiatry, interested in potentially getting inserts due to foot pain  Referral provided  Orders:  -     REFERRAL TO PODIATRY  3. Essential tremor  Assessment & Plan: On propranolol  Wasn't really any better on primidone  Hard to write, type with medications  He doesn't want to add primidone on   Referral to neurology provided. Continue propranolol 80  Orders:  -     REFERRAL TO NEUROLOGY  4.  Hyperlipidemia, unspecified hyperlipidemia type  Assessment & Plan:  He does not think he has been on atorvastatin  Restart atorvastatin 20 mg  Orders:  -     atorvastatin (LIPITOR) 20 mg tablet; Take 1 Tablet by mouth daily. Indications: high cholesterol, Normal, Disp-90 Tablet, R-1  5. Fatigue, unspecified type  Assessment & Plan:  Better since last visit where we stopped his metformin  Continue to monitor  6. Mild episode of recurrent major depressive disorder [facility])  Assessment & Plan:  Doing okay, not interested in making changes today  Continue Wellbutrin  daily. Previously on Zoloft 50 (was stopped when we were trying to figure out if the medication was causing him fatigue)       Benefits, risks, possible drug interactions, and side effects of all new medications were reviewed with the patient. Pt verbalized understanding. Return to clinic: 2 weeks with Marge Brizuela, 1 month with me  4/6 - Step grand daughter born yesterday  chad-tie    An electronic signature was used to authenticate this note. Beau Hernandez MD  [facility]  11/22/2022    Future Appointments   Date Time Provider Patti Gill   12/13/2022  1:30 PM Dalia Elias [facility] BS AMB   1/21/3699 55:60 AM Brittany Carrillo MD [facility] BS AMB    On this date 11/22/2022 I have spent 43 minutes reviewing previous notes, test results and face to face with the patient discussing the diagnosis and importance of compliance with the treatment plan as well as documenting on the day of the visit. Objective   Vitals:       Visit Vitals  /88   Pulse (!) 106   Temp 98 °F (36.7 °C) (Oral)   Resp 14   Ht 6' 2\" (1.88 m)   Wt 200 lb 9.6 oz (91 kg)   SpO2 98%   BMI 25.76 kg/m²        Physical Exam  Constitutional:       Appearance: Normal appearance. He is not ill-appearing. Cardiovascular:      Rate and Rhythm: Regular rhythm. Tachycardia present. Heart sounds: No murmur heard. No friction rub. No gallop. Pulmonary:      Effort: No respiratory distress.       Breath sounds: Normal breath sounds. No wheezing, rhonchi or rales. Neurological:      Mental Status: He is alert. Current Outpatient Medications   Medication Sig    glimepiride (AMARYL) 2 mg tablet Take  by mouth every morning. flash glucose sensor (FreeStyle Epifanio 2 Sensor) kit Use to check your blood sugars throughout the day. insulin lispro (HUMALOG) 100 unit/mL kwikpen 4 Units by SubCUTAneous route Daily (before dinner). Indications: type 2 diabetes mellitus    insulin glargine (Lantus Solostar U-100 Insulin) 100 unit/mL (3 mL) inpn 28 Units by SubCUTAneous route every evening. Indications: type 2 diabetes mellitus    atorvastatin (LIPITOR) 20 mg tablet Take 1 Tablet by mouth daily. Indications: high cholesterol    omeprazole (PRILOSEC) 20 mg capsule Take 1 Capsule by mouth daily. Indications: GERD    ondansetron (ZOFRAN ODT) 4 mg disintegrating tablet Take 1 Tablet by mouth every eight (8) hours as needed for Nausea or Nausea or Vomiting.    dulaglutide (Trulicity) 3 MY/5.7 mL pnij INJECT 3MG UNDER THE SKIN EVERY 7 DAYS    ALPRAZolam (XANAX) 0.25 mg tablet TAKE 1 TABLET BY MOUTH TWO (2) TIMES DAILY AS NEEDED FOR ANXIETY OR SLEEP. MAX DAILY AMOUNT: 0.5 MG.    buPROPion XL (Wellbutrin XL) 300 mg XL tablet Take 1 Tablet by mouth every morning. Indications: anxiety    cyanocobalamin (VITAMIN B12) 1,000 mcg/mL injection 1 mL by IntraMUSCular route Once every 2 weeks. Indications: inadequate vitamin B12    lisinopriL (PRINIVIL, ZESTRIL) 20 mg tablet Take 1 Tablet by mouth daily. *Please note change in tab strength*  Indications: high blood pressure    Syringe with Needle, Disp, (Syringe 3cc/25Gx1\") 3 mL 25 gauge x 1\" syrg Use every other week with B12 injections    Blood-Glucose Meter monitoring kit Use to check blood sugars twice daily - patient using one touch verio strips but needs new meter    propranolol LA (INDERAL LA) 80 mg SR capsule Take 1 Capsule by mouth daily.  Indications: tremor    lancets (One Touch Delica) 33 gauge misc Use 1-4 times daily to monitor sugar    glucose blood VI test strips (OneTouch Verio test strips) strip Use 3-4 times daily to monitor glucose    Vitamin D3 25 mcg (1,000 unit) tablet TAKE 1 TABLET BY MOUTH EVERY DAY (Patient not taking: No sig reported)     No current facility-administered medications for this visit.

## 2022-11-22 NOTE — PATIENT INSTRUCTIONS
For your diabetes:  Increase basaglar to 28 units  Start meal time insulin - insulin lipro 4 units 15-30 min prior to your dinner meal   Continue trulicity  Continue glimpiride  DO NOT TAKE metformin or jardiance  Restart taking atorvastatin

## 2022-11-23 NOTE — ASSESSMENT & PLAN NOTE
On propranolol  Wasn't really any better on primidone  Hard to write, type with medications  He doesn't want to add primidone on   Referral to neurology provided.   Continue propranolol 80

## 2022-11-23 NOTE — ASSESSMENT & PLAN NOTE
Patient requesting referral to podiatry, interested in potentially getting inserts due to foot pain  Referral provided

## 2022-11-23 NOTE — ASSESSMENT & PLAN NOTE
Doing okay, not interested in making changes today  Continue Wellbutrin  daily.   Previously on Zoloft 50 (was stopped when we were trying to figure out if the medication was causing him fatigue)

## 2022-11-23 NOTE — ASSESSMENT & PLAN NOTE
A1c 9.3% today  Readings 160-170s throughout the day   None below 70  Still having some issues with fatigue but not as bad   Nausea not bad  Off metformin   Diet not good, picky eater, high carb - bread, potatoes, does not plan on changing diet  Not well controlled. Increase Basaglar to 28, start dinner lispro 4 units (hopeful with meal time insulin he will get freestyle which he thinks will help give him better feedback regarding his eating and sugars). Continue Trulicity 3, glimepiride 2. Resent freestyle.   Follow-up with LORY JOSE  Also previously on Jardiance, decided to start insulin lispro instead  Previous medications: Metformin (may be fatigue)

## 2022-12-13 ENCOUNTER — OFFICE VISIT (OUTPATIENT)
Dept: INTERNAL MEDICINE CLINIC | Age: 57
End: 2022-12-13

## 2022-12-13 DIAGNOSIS — Z79.4 TYPE 2 DIABETES MELLITUS WITH MICROALBUMINURIA, WITH LONG-TERM CURRENT USE OF INSULIN (HCC): Primary | ICD-10-CM

## 2022-12-13 DIAGNOSIS — E11.29 TYPE 2 DIABETES MELLITUS WITH MICROALBUMINURIA, WITH LONG-TERM CURRENT USE OF INSULIN (HCC): Primary | ICD-10-CM

## 2022-12-13 DIAGNOSIS — R80.9 TYPE 2 DIABETES MELLITUS WITH MICROALBUMINURIA, WITH LONG-TERM CURRENT USE OF INSULIN (HCC): Primary | ICD-10-CM

## 2022-12-13 NOTE — PATIENT INSTRUCTIONS
1) Expect a call about [facility] from [address]    2) Call [facility] to get sensor replaced  [phone]    3) Decrease Lantus to 24 units once daily    4) Continue Humalog 4 units with dinner - can use 2 units with breakfast or lunch if eating more carbs than normal    5) Scan right before bed and right when you wake up along with before meals.

## 2022-12-13 NOTE — PROGRESS NOTES
Pharmacy Progress Note - Diabetes Management    Assessment / Plan:   Diabetes Management:  - Per ADA guidelines, Pt's A1c is not at goal of < 7%. - Current SMBG(s)/CGM trend not at goal TIR >70% although we are missing some data  - Order sent to [facility] for sensors  - discussed product availability with Trulicity  - Decrease Lantus 24 units once daily  - continue humalog 4 units with dinner, up to 2 units with breakfast and lunch  - advised to scan more frequently - patient paying out of pocket for sensors as he cannot do fingers sticks 2/2 essential tremor        Follow up: 1/10/2023       S/O: Princess Beck is a 65 y.o. male referred by Dr. Jaky Lyman MD for diabetes management. Current diabetes regimen include(s):    - Lantus 28 units once daily  - Humalog 4 units with meals and adjusting based on  blood sugar readings  - Trulicity 3mg once weekly   - Glimepiride 2mg once daily in the morning    ROS:  Today, Pt endorses:  - Symptoms of Hyperglycemia: none  - Symptoms of Hypoglycemia: none    Blood Glucose Monitoring (BGM) or CGM:        The 10-year ASCVD risk score (Ho DK, et al., 2019) is: 26.7%    Values used to calculate the score:      Age: 65 years      Sex: Male      Is Non- : No      Diabetic: Yes      Tobacco smoker: No      Systolic Blood Pressure: 541 mmHg      Is BP treated: Yes      HDL Cholesterol: 27 MG/DL      Total Cholesterol: 235 MG/DL     Vitals:   Wt Readings from Last 3 Encounters:   11/22/22 200 lb 9.6 oz (91 kg)   11/07/22 198 lb (89.8 kg)   09/13/22 196 lb (88.9 kg)     BP Readings from Last 3 Encounters:   11/22/22 132/88   11/07/22 125/89   09/13/22 137/89     Pulse Readings from Last 3 Encounters:   11/22/22 (!) 106   11/07/22 (!) 114   09/13/22 83       Lab Results   Component Value Date/Time    Hemoglobin A1c 6.8 (H) 02/23/2021 03:30 AM    Hemoglobin A1c 7.3 (H) 05/19/2010 04:14 PM    Hemoglobin A1c (POC) 9.3 11/22/2022 11:39 AM Hemoglobin A1c (POC) 8.9 04/06/2022 10:51 AM    Hemoglobin A1c (POC) 10.1 11/30/2021 08:44 AM    Glucose 164 (H) 05/06/2022 12:58 PM    Glucose (POC) 102 (H) 03/01/2021 05:02 PM    Microalbumin/Creat ratio (mg/g creat) 120 (H) 03/09/2021 12:12 PM    Microalbumin,urine random 17.70 03/09/2021 12:12 PM    LDL, calculated 131.6 (H) 03/09/2021 12:12 PM    Creatinine (POC) 0.9 05/12/2016 06:53 AM    Creatinine 1.49 (H) 05/06/2022 12:58 PM         CrCl cannot be calculated (Patient's most recent lab result is older than the maximum 180 days allowed. ). Medication reconciliation was completed during the visit. There are no discontinued medications. Patient verbalized understanding of the information presented and all of the patients questions were answered. AVS was handed to the patient. Patient advised to call the office with any additional questions or concerns.     Thank you for the consult,  Villa Delcid, PharmD, BCPS, [facility] Only    Program: Medical Group  CPA in place: Yes  Recommendation Provided To: Patient/Caregiver: 4 via In person  Intervention Detail: Dose Adjustment: 2, reason: Therapy De-escalation and Therapy Optimization, Patient Access Assistance/Sample Provided, and Scheduled Appointment  Intervention Accepted By: Patient/Caregiver: 4  Time Spent (min): 45

## 2022-12-21 ENCOUNTER — OFFICE VISIT (OUTPATIENT)
Dept: INTERNAL MEDICINE CLINIC | Age: 57
End: 2022-12-21
Payer: COMMERCIAL

## 2022-12-21 VITALS
HEART RATE: 86 BPM | HEIGHT: 74 IN | DIASTOLIC BLOOD PRESSURE: 89 MMHG | TEMPERATURE: 97.9 F | SYSTOLIC BLOOD PRESSURE: 142 MMHG | OXYGEN SATURATION: 99 % | BODY MASS INDEX: 25.46 KG/M2 | RESPIRATION RATE: 16 BRPM | WEIGHT: 198.4 LBS

## 2022-12-21 DIAGNOSIS — J40 BRONCHITIS: Primary | ICD-10-CM

## 2022-12-21 PROCEDURE — 99213 OFFICE O/P EST LOW 20 MIN: CPT | Performed by: INTERNAL MEDICINE

## 2022-12-21 RX ORDER — AZITHROMYCIN 250 MG/1
TABLET, FILM COATED ORAL
Qty: 6 TABLET | Refills: 0 | Status: SHIPPED | OUTPATIENT
Start: 2022-12-21 | End: 2022-12-26

## 2022-12-21 RX ORDER — CODEINE PHOSPHATE AND GUAIFENESIN 10; 100 MG/5ML; MG/5ML
5 SOLUTION ORAL
Qty: 60 ML | Refills: 0 | Status: SHIPPED | OUTPATIENT
Start: 2022-12-21 | End: 2022-12-28

## 2022-12-21 NOTE — PROGRESS NOTES
Note   Chief Complaint   cough    Kelsi Zepeda is a 65 y.o. male     1. Bronchitis  -     azithromycin (ZITHROMAX) 250 mg tablet; Take 2 Tablets by mouth daily for 1 day, THEN 1 Tablet daily for 4 days. , Normal, Disp-6 Tablet, R-0  -     guaiFENesin-codeine (ROBITUSSIN AC) 100-10 mg/5 mL solution; Take 5 mL by mouth three (3) times daily as needed for Cough for up to 7 days. Max Daily Amount: 15 mL., Normal, Disp-60 mL, R-0   Malaise, sinus congestion, drainage  Then chest congestion, cough   1.5 weeks, worsening  Cough worse dyllan at night  +chills early on   Hard to breathe with sinus congestion   No body aches  Did not do covid testing  Azithromycin and guaifenesin with codeine sent to pharmacy. Advised to call if symptoms do not improve    Benefits, risks, possible drug interactions, and side effects of all new medications were reviewed with the patient. Pt verbalized understanding. Return to clinic: As scheduled for diabetes  4/6 - Step grand daughter born yesterday  chad-tie    An electronic signature was used to authenticate this note. Lauren Campos MD  [facility]  12/21/2022    Future Appointments   Date Time Provider Patti Gill   9/63/9182 39:20 AM Juan A Carrizales MD [facility] BS AMB   1/10/2023 12:00 PM Lynda Villalpando, PHARMD [facility] BS AMB        Objective   Vitals:       Visit Vitals  BP (!) 142/89 (BP 1 Location: Left upper arm, BP Patient Position: Sitting, BP Cuff Size: Adult)   Pulse 86   Temp 97.9 °F (36.6 °C) (Oral)   Resp 16   Ht 6' 2\" (1.88 m)   Wt 198 lb 6.4 oz (90 kg)   SpO2 99%   BMI 25.47 kg/m²        Physical Exam  Constitutional:       Appearance: Normal appearance. He is not ill-appearing. Cardiovascular:      Rate and Rhythm: Normal rate and regular rhythm. Heart sounds: No murmur heard. No friction rub. No gallop. Pulmonary:      Effort: No respiratory distress. Breath sounds: Normal breath sounds.  No wheezing, rhonchi or rales. Neurological:      Mental Status: He is alert. Current Outpatient Medications   Medication Sig    azithromycin (ZITHROMAX) 250 mg tablet Take 2 Tablets by mouth daily for 1 day, THEN 1 Tablet daily for 4 days. guaiFENesin-codeine (ROBITUSSIN AC) 100-10 mg/5 mL solution Take 5 mL by mouth three (3) times daily as needed for Cough for up to 7 days. Max Daily Amount: 15 mL. glimepiride (AMARYL) 2 mg tablet Take  by mouth every morning. flash glucose sensor (FreeStyle Epifanio 2 Sensor) kit Use to check your blood sugars throughout the day. insulin lispro (HUMALOG) 100 unit/mL kwikpen 4 Units by SubCUTAneous route Daily (before dinner). Indications: type 2 diabetes mellitus    insulin glargine (Lantus Solostar U-100 Insulin) 100 unit/mL (3 mL) inpn 28 Units by SubCUTAneous route every evening. Indications: type 2 diabetes mellitus (Patient taking differently: 24 Units by SubCUTAneous route every evening. Indications: type 2 diabetes mellitus)    atorvastatin (LIPITOR) 20 mg tablet Take 1 Tablet by mouth daily. Indications: high cholesterol    omeprazole (PRILOSEC) 20 mg capsule Take 1 Capsule by mouth daily. Indications: GERD    ondansetron (ZOFRAN ODT) 4 mg disintegrating tablet Take 1 Tablet by mouth every eight (8) hours as needed for Nausea or Nausea or Vomiting.    dulaglutide (Trulicity) 3 VR/1.7 mL pnij INJECT 3MG UNDER THE SKIN EVERY 7 DAYS    ALPRAZolam (XANAX) 0.25 mg tablet TAKE 1 TABLET BY MOUTH TWO (2) TIMES DAILY AS NEEDED FOR ANXIETY OR SLEEP. MAX DAILY AMOUNT: 0.5 MG.    buPROPion XL (Wellbutrin XL) 300 mg XL tablet Take 1 Tablet by mouth every morning. Indications: anxiety    cyanocobalamin (VITAMIN B12) 1,000 mcg/mL injection 1 mL by IntraMUSCular route Once every 2 weeks. Indications: inadequate vitamin B12    lisinopriL (PRINIVIL, ZESTRIL) 20 mg tablet Take 1 Tablet by mouth daily.  *Please note change in tab strength*  Indications: high blood pressure Syringe with Needle, Disp, (Syringe 3cc/25Gx1\") 3 mL 25 gauge x 1\" syrg Use every other week with B12 injections    Blood-Glucose Meter monitoring kit Use to check blood sugars twice daily - patient using one touch verio strips but needs new meter    propranolol LA (INDERAL LA) 80 mg SR capsule Take 1 Capsule by mouth daily. Indications: tremor    lancets (One Touch Delica) 33 gauge misc Use 1-4 times daily to monitor sugar    glucose blood VI test strips (OneTouch Verio test strips) strip Use 3-4 times daily to monitor glucose    Vitamin D3 25 mcg (1,000 unit) tablet TAKE 1 TABLET BY MOUTH EVERY DAY (Patient not taking: No sig reported)     No current facility-administered medications for this visit.

## 2023-01-09 DIAGNOSIS — I10 ESSENTIAL HYPERTENSION: ICD-10-CM

## 2023-01-10 ENCOUNTER — OFFICE VISIT (OUTPATIENT)
Dept: INTERNAL MEDICINE CLINIC | Age: 58
End: 2023-01-10

## 2023-01-10 ENCOUNTER — OFFICE VISIT (OUTPATIENT)
Dept: INTERNAL MEDICINE CLINIC | Age: 58
End: 2023-01-10
Payer: COMMERCIAL

## 2023-01-10 VITALS
HEIGHT: 74 IN | WEIGHT: 196.8 LBS | OXYGEN SATURATION: 97 % | HEART RATE: 85 BPM | TEMPERATURE: 97.5 F | RESPIRATION RATE: 14 BRPM | DIASTOLIC BLOOD PRESSURE: 84 MMHG | BODY MASS INDEX: 25.26 KG/M2 | SYSTOLIC BLOOD PRESSURE: 130 MMHG

## 2023-01-10 DIAGNOSIS — R80.9 TYPE 2 DIABETES MELLITUS WITH MICROALBUMINURIA, WITH LONG-TERM CURRENT USE OF INSULIN (HCC): Primary | ICD-10-CM

## 2023-01-10 DIAGNOSIS — Z79.4 TYPE 2 DIABETES MELLITUS WITH MICROALBUMINURIA, WITH LONG-TERM CURRENT USE OF INSULIN (HCC): Primary | ICD-10-CM

## 2023-01-10 DIAGNOSIS — R53.83 FATIGUE, UNSPECIFIED TYPE: ICD-10-CM

## 2023-01-10 DIAGNOSIS — R80.9 TYPE 2 DIABETES MELLITUS WITH MICROALBUMINURIA, WITH LONG-TERM CURRENT USE OF INSULIN (HCC): ICD-10-CM

## 2023-01-10 DIAGNOSIS — E11.29 TYPE 2 DIABETES MELLITUS WITH MICROALBUMINURIA, WITH LONG-TERM CURRENT USE OF INSULIN (HCC): Primary | ICD-10-CM

## 2023-01-10 DIAGNOSIS — F33.0 MILD EPISODE OF RECURRENT MAJOR DEPRESSIVE DISORDER (HCC): ICD-10-CM

## 2023-01-10 DIAGNOSIS — E78.5 HYPERLIPIDEMIA, UNSPECIFIED HYPERLIPIDEMIA TYPE: ICD-10-CM

## 2023-01-10 DIAGNOSIS — N20.0 RECURRENT NEPHROLITHIASIS: ICD-10-CM

## 2023-01-10 DIAGNOSIS — F98.8 ATTENTION DEFICIT DISORDER, UNSPECIFIED HYPERACTIVITY PRESENCE: Primary | ICD-10-CM

## 2023-01-10 DIAGNOSIS — E11.29 TYPE 2 DIABETES MELLITUS WITH MICROALBUMINURIA, WITH LONG-TERM CURRENT USE OF INSULIN (HCC): ICD-10-CM

## 2023-01-10 DIAGNOSIS — Z79.4 TYPE 2 DIABETES MELLITUS WITH MICROALBUMINURIA, WITH LONG-TERM CURRENT USE OF INSULIN (HCC): ICD-10-CM

## 2023-01-10 DIAGNOSIS — F41.9 ANXIETY: ICD-10-CM

## 2023-01-10 PROCEDURE — 99214 OFFICE O/P EST MOD 30 MIN: CPT | Performed by: INTERNAL MEDICINE

## 2023-01-10 RX ORDER — POTASSIUM CITRATE 15 MEQ/1
15 TABLET, EXTENDED RELEASE ORAL 2 TIMES DAILY
COMMUNITY
Start: 2022-12-29

## 2023-01-10 RX ORDER — GLIMEPIRIDE 2 MG/1
2 TABLET ORAL
Qty: 90 TABLET | Refills: 3 | Status: SHIPPED | OUTPATIENT
Start: 2023-01-10

## 2023-01-10 RX ORDER — ALPRAZOLAM 0.25 MG/1
0.25 TABLET ORAL
Qty: 60 TABLET | Refills: 3 | Status: SHIPPED | OUTPATIENT
Start: 2023-01-10

## 2023-01-10 RX ORDER — LISINOPRIL 20 MG/1
20 TABLET ORAL DAILY
Qty: 90 TABLET | Refills: 3 | Status: SHIPPED | OUTPATIENT
Start: 2023-01-10

## 2023-01-10 RX ORDER — DEXTROAMPHETAMINE SACCHARATE, AMPHETAMINE ASPARTATE, DEXTROAMPHETAMINE SULFATE AND AMPHETAMINE SULFATE 5; 5; 5; 5 MG/1; MG/1; MG/1; MG/1
20 TABLET ORAL 2 TIMES DAILY
Qty: 60 TABLET | Refills: 0 | Status: SHIPPED | OUTPATIENT
Start: 2023-01-10

## 2023-01-10 NOTE — ASSESSMENT & PLAN NOTE
monitored by specialist. No acute findings meriting change in the plan   Followed by urology  Was told he has a 19mm stone   Passed small stones   They are planning to intervene on the large stone

## 2023-01-10 NOTE — ASSESSMENT & PLAN NOTE
well controlled, continue current medications   Atorvastatin restarted last visit. Tolerating.   Continue atorvastatin 20

## 2023-01-10 NOTE — ASSESSMENT & PLAN NOTE
Helplessness, lack of direction  Hard to find work because of medical problems   Borderline controlled. Continue Wellbutrin  daily.   Previously on Zoloft 50 (was stopped and we are trying to figure out if the medication was causing him fatigue)

## 2023-01-10 NOTE — ASSESSMENT & PLAN NOTE
Has been able to get freestyle  Back on jardiance - urology said it was okay  Seen by Marcela Coronel after last visit -   - Decrease Lantus 24 units once daily  - continue humalog 4 units with dinner, up to 2 units with breakfast and lunch  Appt with Marcela Coronel after this visit.   Currently:  Lantus 24  Humaog 4 with dinner, up to 2 with bk and lunch  Jardiance 10  Trulicity 3  Glimepiride 2

## 2023-01-10 NOTE — PROGRESS NOTES
Note   Chief Complaint   Follow-up    Simon Rachel is a 65 y.o. male     1. Attention deficit disorder, unspecified hyperactivity presence  Assessment & Plan:   Was on adderall previously but his insurance stopped covering it  difficult to focus  Had testing done in college and was prescribed adderall and had been on it until insurance stopped covering when he first establish care with me  Prescribed BID before but didn't usually take BID   Paper script for adderall 20 BID provided  Orders:  -     dextroamphetamine-amphetamine (ADDERALL) 20 mg tablet; Take 1 Tablet by mouth two (2) times a day. Max Daily Amount: 40 mg., Print, Disp-60 Tablet, R-0  2. Type 2 diabetes mellitus with microalbuminuria, with long-term current use of insulin ([facility])  Assessment & Plan:   Has been able to get freestyle  Back on jardiance - urology said it was okay  Seen by Cyndy Davis after last visit -   - Decrease Lantus 24 units once daily  - continue humalog 4 units with dinner, up to 2 units with breakfast and lunch  Appt with Cyndy Jiménezor after this visit. Currently:  Lantus 24  Humaog 4 with dinner, up to 2 with bk and lunch  Jardiance 10  Trulicity 3  Glimepiride 2  Orders:  -     empagliflozin (Jardiance) 10 mg tablet; TAKE 1 TABLET BY MOUTH DAILY. INDICATIONS: TYPE 2 DIABETES MELLITUS, Normal, Disp-90 Tablet, R-3  -     glimepiride (AMARYL) 2 mg tablet; Take 1 Tablet by mouth every morning., Normal, Disp-90 Tablet, R-3  3. Anxiety  -     ALPRAZolam (XANAX) 0.25 mg tablet; Take 1 Tablet by mouth two (2) times daily as needed for Anxiety or Sleep. Max Daily Amount: 0.5 mg., Normal, Disp-60 Tablet, R-3This request is for a new prescription for a controlled substance as required by Federal/State law. 4. Hyperlipidemia, unspecified hyperlipidemia type  Assessment & Plan:   well controlled, continue current medications   Atorvastatin restarted last visit. Tolerating. Continue atorvastatin 20  5.  Fatigue, unspecified type  Assessment & Plan:  Better, not as tired during the day  6. Mild episode of recurrent major depressive disorder [facility])  Assessment & Plan:   Helplessness, lack of direction  Hard to find work because of medical problems   Borderline controlled. Continue Wellbutrin  daily. Previously on Zoloft 50 (was stopped and we are trying to figure out if the medication was causing him fatigue)  7. Recurrent nephrolithiasis  Assessment & Plan:   monitored by specialist. No acute findings meriting change in the plan   Followed by urology  Was told he has a 19mm stone   Passed small stones   They are planning to intervene on the large stone     Benefits, risks, possible drug interactions, and side effects of all new medications were reviewed with the patient. Pt verbalized understanding. Return to clinic: 3 months for ADHD, diabetes, sign agreement  4/6 - Step grand daughter born yesterday  chad-tie    An electronic signature was used to authenticate this note. Rosa Nunez MD  [facility]  1/10/2023    Future Appointments   Date Time Provider Patti Dumonti   1/24/2023 11:30 AM Shaun Tapia [facility] BS AMB   9/64/7780 23:47 AM Fariba Ramirez MD [facility] BS AMB        Objective   Vitals:       Visit Vitals  /84   Pulse 85   Temp 97.5 °F (36.4 °C) (Oral)   Resp 14   Ht 6' 2\" (1.88 m)   Wt 196 lb 12.8 oz (89.3 kg)   SpO2 97%   BMI 25.27 kg/m²        Physical Exam  Constitutional:       Appearance: Normal appearance. He is not ill-appearing. Cardiovascular:      Rate and Rhythm: Normal rate and regular rhythm. Heart sounds: No murmur heard. No friction rub. No gallop. Pulmonary:      Effort: No respiratory distress. Breath sounds: Normal breath sounds. No wheezing, rhonchi or rales. Neurological:      Mental Status: He is alert.         Current Outpatient Medications   Medication Sig    potassium citrate (UROCIT-K) 15 mEq TbER tablet Take 15 mEq by mouth two (2) times a day. empagliflozin (Jardiance) 10 mg tablet TAKE 1 TABLET BY MOUTH DAILY. INDICATIONS: TYPE 2 DIABETES MELLITUS    ALPRAZolam (XANAX) 0.25 mg tablet Take 1 Tablet by mouth two (2) times daily as needed for Anxiety or Sleep. Max Daily Amount: 0.5 mg.    glimepiride (AMARYL) 2 mg tablet Take 1 Tablet by mouth every morning. dextroamphetamine-amphetamine (ADDERALL) 20 mg tablet Take 1 Tablet by mouth two (2) times a day. Max Daily Amount: 40 mg.    cyanocobalamin (VITAMIN B12) 1,000 mcg/mL injection 1 ML BY INTRAMUSCULAR ROUTE ONCE EVERY 2 WEEKS. INDICATIONS: INADEQUATE VITAMIN B12    flash glucose sensor (FreeStyle Epifanio 2 Sensor) kit Use to check your blood sugars throughout the day. insulin lispro (HUMALOG) 100 unit/mL kwikpen 4 Units by SubCUTAneous route Daily (before dinner). Indications: type 2 diabetes mellitus    insulin glargine (Lantus Solostar U-100 Insulin) 100 unit/mL (3 mL) inpn 28 Units by SubCUTAneous route every evening. Indications: type 2 diabetes mellitus (Patient taking differently: 24 Units by SubCUTAneous route every evening. Indications: type 2 diabetes mellitus)    atorvastatin (LIPITOR) 20 mg tablet Take 1 Tablet by mouth daily. Indications: high cholesterol    omeprazole (PRILOSEC) 20 mg capsule Take 1 Capsule by mouth daily. Indications: GERD    ondansetron (ZOFRAN ODT) 4 mg disintegrating tablet Take 1 Tablet by mouth every eight (8) hours as needed for Nausea or Nausea or Vomiting.    dulaglutide (Trulicity) 3 ML/4.3 mL pnij INJECT 3MG UNDER THE SKIN EVERY 7 DAYS    buPROPion XL (Wellbutrin XL) 300 mg XL tablet Take 1 Tablet by mouth every morning. Indications: anxiety    lisinopriL (PRINIVIL, ZESTRIL) 20 mg tablet Take 1 Tablet by mouth daily.  *Please note change in tab strength*  Indications: high blood pressure    Syringe with Needle, Disp, (Syringe 3cc/25Gx1\") 3 mL 25 gauge x 1\" syrg Use every other week with B12 injections    Blood-Glucose Meter monitoring kit Use to check blood sugars twice daily - patient using one touch verio strips but needs new meter    propranolol LA (INDERAL LA) 80 mg SR capsule Take 1 Capsule by mouth daily. Indications: tremor    lancets (One Touch Delica) 33 gauge misc Use 1-4 times daily to monitor sugar    glucose blood VI test strips (OneTouch Verio test strips) strip Use 3-4 times daily to monitor glucose    Vitamin D3 25 mcg (1,000 unit) tablet TAKE 1 TABLET BY MOUTH EVERY DAY (Patient not taking: No sig reported)     No current facility-administered medications for this visit.

## 2023-01-10 NOTE — ASSESSMENT & PLAN NOTE
Was on adderall previously but his insurance stopped covering it  difficult to focus  Had testing done in college and was prescribed adderall and had been on it until insurance stopped covering when he first establish care with me  Prescribed BID before but didn't usually take BID   Paper script for adderall 20 BID provided

## 2023-01-12 ENCOUNTER — DOCUMENTATION ONLY (OUTPATIENT)
Dept: INTERNAL MEDICINE CLINIC | Age: 58
End: 2023-01-12

## 2023-01-12 NOTE — PROGRESS NOTES
PA completed by nurse via covermymeds has been approve by patient insurance. Please see below for approval dates and information. Drug: Ozempic  Strength: 2mg/1.5ml  Quantity/Day Supply: 1.5/28  Duration: 12 Months  01/12/2023 - 01/12/2024      PA completed by nurse via covermymeds. PA pending insurance approval please see below for PA ceja.   Maral Adame (Ceja: [id number])  Ozempic (0.25 or 0.5 MG/DOSE) 2MG/1.5ML pen-injectors

## 2023-01-18 ENCOUNTER — TELEPHONE (OUTPATIENT)
Dept: INTERNAL MEDICINE CLINIC | Age: 58
End: 2023-01-18

## 2023-01-18 NOTE — TELEPHONE ENCOUNTER
Called patient to discuss high blood sugars. Patient states that since being in the office numbers have just seemed to increase and haven't come down much. Reviewed all information. THey do seem like they've stabilized a little bit over the past day or 2. Advised patient to increase bolus insulin to 6/6/8 and to add 2 units if still high when due for a dose. Patient verbalized understanding.     Carla Sutton, PharmD, [facility], [address] [facility]    Program: Medical Group  CPA in place: Yes  Recommendation Provided To: Patient/Caregiver: 1 via Telephone  Intervention Detail: Dose Adjustment: 1, reason: Therapy Optimization  Intervention Accepted By: Patient/Caregiver: 1  Time Spent (min): 15

## 2023-01-19 NOTE — PROGRESS NOTES
Pharmacy Progress Note - Diabetes Management    Assessment / Plan:   Diabetes Management:  - Per ADA guidelines, Pt's A1c is not at goal of < 7%. - Current SMBG(s)/CGM trend showing blood sugars running really high and increasing throughout the day. Will increase Humalog - 4 units with breakfast and lunch and 6 units with dinner.  -Continue other meds as directed    Follow up: 1/18/2023     S/O: Weston Vanegas is a 65 y.o. male referred by Dr. Livia Palmer MD for diabetes management. Patient was here for a brief visit after PCP visit to see how blood sugars are running. Current diabetes regimen include(s):    - Lantus 24 units once daily  - Trulicity 3mg once weekly  - Glimepiride 2mg once daily in the morning  - Humalog 4 units with dinner 2 units with other meals as directed    ROS:  Today, Pt endorses:  - Symptoms of Hyperglycemia: none  - Symptoms of Hypoglycemia: none    Blood Glucose Monitoring (BGM) or CGM:  Sugars are running high consistently - increasing after meals  No low blood sugars  Issues with sensors      The 10-year ASCVD risk score (Ho DK, et al., 2019) is: 26.1%    Values used to calculate the score:      Age: 65 years      Sex: Male      Is Non- : No      Diabetic: Yes      Tobacco smoker: No      Systolic Blood Pressure: 991 mmHg      Is BP treated: Yes      HDL Cholesterol: 27 MG/DL      Total Cholesterol: 235 MG/DL     Vitals:   Wt Readings from Last 3 Encounters:   01/10/23 196 lb 12.8 oz (89.3 kg)   12/21/22 198 lb 6.4 oz (90 kg)   11/22/22 200 lb 9.6 oz (91 kg)     BP Readings from Last 3 Encounters:   01/10/23 130/84   12/21/22 (!) 142/89   11/22/22 132/88     Pulse Readings from Last 3 Encounters:   01/10/23 85   12/21/22 86   11/22/22 (!) 106       Lab Results   Component Value Date/Time    Hemoglobin A1c 6.8 (H) 02/23/2021 03:30 AM    Hemoglobin A1c 7.3 (H) 05/19/2010 04:14 PM    Hemoglobin A1c (POC) 9.3 11/22/2022 11:39 AM    Hemoglobin A1c (POC) 8.9 04/06/2022 10:51 AM    Hemoglobin A1c (POC) 10.1 11/30/2021 08:44 AM    Glucose 164 (H) 05/06/2022 12:58 PM    Glucose (POC) 102 (H) 03/01/2021 05:02 PM    Microalbumin/Creat ratio (mg/g creat) 120 (H) 03/09/2021 12:12 PM    Microalbumin,urine random 17.70 03/09/2021 12:12 PM    LDL, calculated 131.6 (H) 03/09/2021 12:12 PM    Creatinine (POC) 0.9 05/12/2016 06:53 AM    Creatinine 1.49 (H) 05/06/2022 12:58 PM         CrCl cannot be calculated (Patient's most recent lab result is older than the maximum 180 days allowed. ). Medication reconciliation was completed during the visit. There are no discontinued medications. Patient verbalized understanding of the information presented and all of the patients questions were answered. AVS was handed to the patient. Patient advised to call the office with any additional questions or concerns.     Thank you for the consult,  Tyesha Aguilera, PharmD, BCPS, [facility]    Program: Medical Group  CPA in place: Yes  Recommendation Provided To: Patient/Caregiver: 2 via In person  Intervention Detail: Dose Adjustment: 1, reason: Therapy Optimization and Scheduled Appointment  Intervention Accepted By: Patient/Caregiver: 2  Time Spent (min): 30

## 2023-01-24 ENCOUNTER — VIRTUAL VISIT (OUTPATIENT)
Dept: INTERNAL MEDICINE CLINIC | Age: 58
End: 2023-01-24

## 2023-01-24 DIAGNOSIS — Z79.4 TYPE 2 DIABETES MELLITUS WITH MICROALBUMINURIA, WITH LONG-TERM CURRENT USE OF INSULIN (HCC): Primary | ICD-10-CM

## 2023-01-24 DIAGNOSIS — E11.29 TYPE 2 DIABETES MELLITUS WITH MICROALBUMINURIA, WITH LONG-TERM CURRENT USE OF INSULIN (HCC): Primary | ICD-10-CM

## 2023-01-24 DIAGNOSIS — R80.9 TYPE 2 DIABETES MELLITUS WITH MICROALBUMINURIA, WITH LONG-TERM CURRENT USE OF INSULIN (HCC): Primary | ICD-10-CM

## 2023-01-24 NOTE — PROGRESS NOTES
Pharmacy Progress Note - Telephone Encounter - Diabetes Management    Assessment / Plan:   Diabetes Management:  - Per ADA guidelines, Pt's A1c {IS/IS NOT:56772} at goal of < ***.   - Current SMBG(s)/CGM trend     Humalog - 4 units even if not having meal but having a carb drink  Increase dinner 10 units humalog    Follow up:      S/O: Carmel Toribio is a 65 y.o. male referred by Dr. Ro Rivera MD for diabetes management. Outgoing call was placed to patient (2 identifiers used) to follow up on diabetes. Current diabetes regimen include(s):    - Lantus 24 units once daily  - Trulicity 3mg once weekly  - Glimepiride 2mg once daily in the morning  - Humalog 6 units with dinner 4 units with other meals as directed    ROS:  Today, Pt endorses:  - Symptoms of Hyperglycemia: none  - Symptoms of Hypoglycemia: none    Blood Glucose Monitoring (BGM) or CGM:      Nutrition:      Physical Activity:       Last eye exam:  ***    The 10-year ASCVD risk score (Ho DK, et al., 2019) is: 26.1%    Values used to calculate the score:      Age: 65 years      Sex: Male      Is Non- : No      Diabetic: Yes      Tobacco smoker: No      Systolic Blood Pressure: 638 mmHg      Is BP treated: Yes      HDL Cholesterol: 27 MG/DL      Total Cholesterol: 235 MG/DL     Vitals:   Wt Readings from Last 3 Encounters:   01/10/23 196 lb 12.8 oz (89.3 kg)   12/21/22 198 lb 6.4 oz (90 kg)   11/22/22 200 lb 9.6 oz (91 kg)     BP Readings from Last 3 Encounters:   01/10/23 130/84   12/21/22 (!) 142/89   11/22/22 132/88     Pulse Readings from Last 3 Encounters:   01/10/23 85   12/21/22 86   11/22/22 (!) 106       Lab Results   Component Value Date/Time    Hemoglobin A1c 6.8 (H) 02/23/2021 03:30 AM    Hemoglobin A1c 7.3 (H) 05/19/2010 04:14 PM    Hemoglobin A1c (POC) 9.3 11/22/2022 11:39 AM    Hemoglobin A1c (POC) 8.9 04/06/2022 10:51 AM    Hemoglobin A1c (POC) 10.1 11/30/2021 08:44 AM    Glucose 164 (H) 05/06/2022 12:58 PM    Glucose (POC) 102 (H) 03/01/2021 05:02 PM    Microalbumin/Creat ratio (mg/g creat) 120 (H) 03/09/2021 12:12 PM    Microalbumin,urine random 17.70 03/09/2021 12:12 PM    LDL, calculated 131.6 (H) 03/09/2021 12:12 PM    Creatinine (POC) 0.9 05/12/2016 06:53 AM    Creatinine 1.49 (H) 05/06/2022 12:58 PM         CrCl cannot be calculated (Patient's most recent lab result is older than the maximum 180 days allowed. ). Medication reconciliation was completed during the visit. There are no discontinued medications. Patient verbalized understanding of the information presented and all of the patients questions were answered. AVS available for patient to read in [employer]. Patient advised to call the office with any additional questions or concerns.     Thank you for the consult,  Cadence Apodaca, PharmD, BCPS, CDCES      {[facility] [employer]

## 2023-03-07 DIAGNOSIS — Z79.4 TYPE 2 DIABETES MELLITUS WITH MICROALBUMINURIA, WITH LONG-TERM CURRENT USE OF INSULIN (HCC): ICD-10-CM

## 2023-03-07 DIAGNOSIS — E11.29 TYPE 2 DIABETES MELLITUS WITH MICROALBUMINURIA, WITH LONG-TERM CURRENT USE OF INSULIN (HCC): ICD-10-CM

## 2023-03-07 DIAGNOSIS — R80.9 TYPE 2 DIABETES MELLITUS WITH MICROALBUMINURIA, WITH LONG-TERM CURRENT USE OF INSULIN (HCC): ICD-10-CM

## 2023-03-07 RX ORDER — INSULIN GLARGINE 100 [IU]/ML
24 INJECTION, SOLUTION SUBCUTANEOUS EVERY EVENING
Qty: 21.6 ML | Refills: 3 | Status: SHIPPED | OUTPATIENT
Start: 2023-03-07

## 2023-03-15 ENCOUNTER — OFFICE VISIT (OUTPATIENT)
Dept: INTERNAL MEDICINE CLINIC | Age: 58
End: 2023-03-15

## 2023-03-15 DIAGNOSIS — E11.29 TYPE 2 DIABETES MELLITUS WITH MICROALBUMINURIA, WITH LONG-TERM CURRENT USE OF INSULIN (HCC): Primary | ICD-10-CM

## 2023-03-15 DIAGNOSIS — R80.9 TYPE 2 DIABETES MELLITUS WITH MICROALBUMINURIA, WITH LONG-TERM CURRENT USE OF INSULIN (HCC): Primary | ICD-10-CM

## 2023-03-15 DIAGNOSIS — Z79.4 TYPE 2 DIABETES MELLITUS WITH MICROALBUMINURIA, WITH LONG-TERM CURRENT USE OF INSULIN (HCC): Primary | ICD-10-CM

## 2023-03-15 NOTE — PROGRESS NOTES
Will need auth for next batch of sensors in may/June - looks like they want to check every 5 months for improvement in a1c. - home care delivered Pulse Readings from Last 3 Encounters:   01/10/23 85   12/21/22 86   11/22/22 (!) 106       Lab Results   Component Value Date/Time    Hemoglobin A1c 6.8 (H) 02/23/2021 03:30 AM    Hemoglobin A1c 7.3 (H) 05/19/2010 04:14 PM    Hemoglobin A1c (POC) 9.3 11/22/2022 11:39 AM    Hemoglobin A1c (POC) 8.9 04/06/2022 10:51 AM    Hemoglobin A1c (POC) 10.1 11/30/2021 08:44 AM    Glucose 164 (H) 05/06/2022 12:58 PM    Glucose (POC) 102 (H) 03/01/2021 05:02 PM    Microalbumin/Creat ratio (mg/g creat) 120 (H) 03/09/2021 12:12 PM    Microalbumin,urine random 17.70 03/09/2021 12:12 PM    LDL, calculated 131.6 (H) 03/09/2021 12:12 PM    Creatinine (POC) 0.9 05/12/2016 06:53 AM    Creatinine 1.49 (H) 05/06/2022 12:58 PM         CrCl cannot be calculated (Patient's most recent lab result is older than the maximum 180 days allowed. ). Medication reconciliation was completed during the visit. There are no discontinued medications. Patient verbalized understanding of the information presented and all of the patients questions were answered. AVS was handed to the patient. Patient advised to call the office with any additional questions or concerns.     Thank you for the consult,  Mono Scott, PharmD, BCPS, [facility]    Program: Medical Group  CPA in place: Yes  Recommendation Provided To: Patient/Caregiver: 2 via In person  Intervention Detail: Dose Adjustment: 1, reason: Therapy Optimization and Scheduled Appointment  Intervention Accepted By: Patient/Caregiver: 2  Time Spent (min): 45

## 2023-03-15 NOTE — PATIENT INSTRUCTIONS
Plan:  --Continue Lantus 24 units once daily  --Start using Humalog/Novolog only with your meals - breakfast and dinner    Check blood sugar prior to meal and use the following scale    Blood sugar  <150 -- 4 units  150-200 -- 5 units  201-250 -- 6 units  251-300 -- 7 units  >301 -- 8 units    Take your mealtime/food insulin 15 minutes before, during or up to 15 minutes after your meal.     --Call home care delivered for refill on sensor - let Bhumi Banerjee know if you have any issues with this.          [facility] Offices  [address] [facility]   [facility]  -Dr. Niecy Neal     [facility]      Kassandra May  -Dr. Miguel Carrero

## 2023-03-29 ENCOUNTER — VIRTUAL VISIT (OUTPATIENT)
Dept: INTERNAL MEDICINE CLINIC | Age: 58
End: 2023-03-29

## 2023-03-29 DIAGNOSIS — E11.29 TYPE 2 DIABETES MELLITUS WITH MICROALBUMINURIA, WITH LONG-TERM CURRENT USE OF INSULIN (HCC): Primary | ICD-10-CM

## 2023-03-29 DIAGNOSIS — R80.9 TYPE 2 DIABETES MELLITUS WITH MICROALBUMINURIA, WITH LONG-TERM CURRENT USE OF INSULIN (HCC): Primary | ICD-10-CM

## 2023-03-29 DIAGNOSIS — Z79.4 TYPE 2 DIABETES MELLITUS WITH MICROALBUMINURIA, WITH LONG-TERM CURRENT USE OF INSULIN (HCC): Primary | ICD-10-CM

## 2023-04-18 ENCOUNTER — OFFICE VISIT (OUTPATIENT)
Dept: INTERNAL MEDICINE CLINIC | Age: 58
End: 2023-04-18
Payer: COMMERCIAL

## 2023-04-18 VITALS
HEIGHT: 74 IN | OXYGEN SATURATION: 96 % | RESPIRATION RATE: 16 BRPM | DIASTOLIC BLOOD PRESSURE: 86 MMHG | SYSTOLIC BLOOD PRESSURE: 130 MMHG | HEART RATE: 81 BPM | TEMPERATURE: 98 F | WEIGHT: 192.2 LBS | BODY MASS INDEX: 24.67 KG/M2

## 2023-04-18 DIAGNOSIS — R42 DIZZINESS: Primary | ICD-10-CM

## 2023-04-18 DIAGNOSIS — I10 ESSENTIAL HYPERTENSION: ICD-10-CM

## 2023-04-18 LAB
ALBUMIN SERPL-MCNC: 4.3 G/DL (ref 3.8–4.9)
ALBUMIN/GLOB SERPL: 1.2 {RATIO} (ref 1.2–2.2)
ALP SERPL-CCNC: 131 IU/L (ref 44–121)
ALT SERPL-CCNC: 25 IU/L (ref 0–44)
AST SERPL-CCNC: 31 IU/L (ref 0–40)
BILIRUB SERPL-MCNC: 0.7 MG/DL (ref 0–1.2)
BUN SERPL-MCNC: 14 MG/DL (ref 6–24)
BUN/CREAT SERPL: 10 (ref 9–20)
CALCIUM SERPL-MCNC: 9.5 MG/DL (ref 8.7–10.2)
CHLORIDE SERPL-SCNC: 99 MMOL/L (ref 96–106)
CHOLEST SERPL-MCNC: 124 MG/DL (ref 100–199)
CO2 SERPL-SCNC: 21 MMOL/L (ref 20–29)
CREAT SERPL-MCNC: 1.39 MG/DL (ref 0.76–1.27)
EGFRCR SERPLBLD CKD-EPI 2021: 59 ML/MIN/1.73
ERYTHROCYTE [DISTWIDTH] IN BLOOD BY AUTOMATED COUNT: 13.5 % (ref 11.6–15.4)
GLOBULIN SER CALC-MCNC: 3.5 G/DL (ref 1.5–4.5)
GLUCOSE SERPL-MCNC: 142 MG/DL (ref 70–99)
HCT VFR BLD AUTO: 48.4 % (ref 37.5–51)
HDLC SERPL-MCNC: 22 MG/DL
HGB BLD-MCNC: 15.9 G/DL (ref 13–17.7)
IMP & REVIEW OF LAB RESULTS: NORMAL
LDLC SERPL CALC-MCNC: 60 MG/DL (ref 0–99)
MCH RBC QN AUTO: 27.1 PG (ref 26.6–33)
MCHC RBC AUTO-ENTMCNC: 32.9 G/DL (ref 31.5–35.7)
MCV RBC AUTO: 83 FL (ref 79–97)
PLATELET # BLD AUTO: 268 X10E3/UL (ref 150–450)
POTASSIUM SERPL-SCNC: 4.6 MMOL/L (ref 3.5–5.2)
PROT SERPL-MCNC: 7.8 G/DL (ref 6–8.5)
PSA SERPL-MCNC: 0.8 NG/ML (ref 0–4)
RBC # BLD AUTO: 5.87 X10E6/UL (ref 4.14–5.8)
REFLEX CRITERIA: NORMAL
SODIUM SERPL-SCNC: 140 MMOL/L (ref 134–144)
TRIGL SERPL-MCNC: 264 MG/DL (ref 0–149)
VLDLC SERPL CALC-MCNC: 42 MG/DL (ref 5–40)
WBC # BLD AUTO: 8.9 X10E3/UL (ref 3.4–10.8)

## 2023-04-18 PROCEDURE — 99213 OFFICE O/P EST LOW 20 MIN: CPT | Performed by: NURSE PRACTITIONER

## 2023-04-18 RX ORDER — MECLIZINE HCL 12.5 MG 12.5 MG/1
12.5 TABLET ORAL
Qty: 30 TABLET | Refills: 0 | Status: SHIPPED | OUTPATIENT
Start: 2023-04-18 | End: 2023-04-28

## 2023-04-18 NOTE — PROGRESS NOTES
Assessment and Plan     1. Dizziness: Lab work, PT for vestibular therapy ordered. Meclizine to be taken as needed, mode of use and possible side effects discussed. Hydration and rest encouraged. Return and strict ER instructions given. -     REFERRAL TO PHYSICAL THERAPY  -     VITAMIN B12 & FOLATE; Future  -     TSH 3RD GENERATION; Future  -     DUPLEX CAROTID BILATERAL; Future  -     VITAMIN D, 25 HYDROXY; Future  -     meclizine (ANTIVERT) 12.5 mg tablet; Take 1 Tablet by mouth three (3) times daily as needed for Dizziness for up to 10 days. , Normal, Disp-30 Tablet, R-0  2. Essential hypertension: Blood pressure monitoring recommended. Continue with same treatment. Benefits, risks, possible drug interactions, and side effects of all new medications were reviewed with the patient. Pt verbalized understanding. Return to clinic:    Follow-up and Dispositions    Return in about 4 weeks (around 5/16/2023). An electronic signature was used to authenticate this note. Pooja Hodges, [address] [facility]  [facility] Johnny Longoria  [address] [facility] [id number]  4/18/2023      Future Appointments   Date Time Provider Patti Bridget   4/19/2023 11:00 AM Susan [facility] AMB   5/9/2023  9:20 AM Fady Townsend MD [facility] BS AMB   8/60/5273 58:60 AM Vera Santos MD [facility] BS AMB        History of Present Illness   Chief Complaint     Ilya Haile is a 63 y.o. male here for had concerns including Dizziness (Dizziness; 04/16). Dizziness: Dizziness started 2 days ago while cutting grass, felt spinning sensation. Dizziness is constant, \"almost feel a little drunk\". Lying down given relief. Pt does not drink or smoke. Pt stays well-hydrated. Pt has hx of hearing loss for about 20 years, wears hearing aid. Last appt with audiologist was 8 months ago.  Jamie nausea, vomiting, blurry vision, weakness of extremities. Hx of type II DM, on insulin reports glucose reading averaging 156. Pt has hx of HTN, taking medication with compliance, pt does not check blood pressure at home. Denies chest pain, shortness of breath. Review of Systems  Review of Systems   Constitutional: Negative. Negative for chills, fever and malaise/fatigue. HENT:  Negative for congestion, sinus pain and sore throat. Respiratory: Negative. Negative for cough, sputum production and shortness of breath. Cardiovascular: Negative. Negative for chest pain and palpitations. Gastrointestinal: Negative. Negative for abdominal pain, constipation, diarrhea, nausea and vomiting. Genitourinary: Negative. Negative for dysuria, frequency and urgency. Musculoskeletal: Negative. Negative for myalgias. Neurological:  Positive for dizziness. Negative for tingling, tremors and headaches. Psychiatric/Behavioral: Negative. Negative for depression. The patient is not nervous/anxious. Past Medical History   No Known Allergies     Current Outpatient Medications   Medication Sig    meclizine (ANTIVERT) 12.5 mg tablet Take 1 Tablet by mouth three (3) times daily as needed for Dizziness for up to 10 days. Insulin Needles, Disposable, (Elisabeth Pen Needle) 32 gauge x 5/32\" ndle Use with insulin    buPROPion XL (WELLBUTRIN XL) 150 mg tablet Take 1 Tablet by mouth every morning. Take with 300mg tab for a total of 450mg    omeprazole (PRILOSEC) 20 mg capsule Take 1 Capsule by mouth daily. Indications: GERD    dulaglutide (Trulicity) 3 IL/1.4 mL pnij 3 mg by SubCUTAneous route every seven (7) days. buPROPion XL (Wellbutrin XL) 300 mg XL tablet Take 1 Tablet by mouth every morning. Take with 150mg tablet for a total of 450mg  Indications: anxiety    propranolol LA (INDERAL LA) 80 mg SR capsule Take 1 Capsule by mouth daily.  Indications: tremor    insulin lispro (HUMALOG) 100 unit/mL kwikpen Inject subq based on sugar reading: <150 -- 4 units, 150-200 -- 5 units, 201-250 -- 6 units, 251-300 -- 7 units, >301 -- 8 units  Indications: type 2 diabetes mellitus    insulin glargine (Lantus Solostar U-100 Insulin) 100 unit/mL (3 mL) inpn 24 Units by SubCUTAneous route every evening. Indications: type 2 diabetes mellitus    lisinopriL (PRINIVIL, ZESTRIL) 20 mg tablet Take 1 Tablet by mouth daily. Indications: high blood pressure    potassium citrate (UROCIT-K) 15 mEq TbER tablet Take 1 Tablet by mouth two (2) times a day. empagliflozin (Jardiance) 10 mg tablet TAKE 1 TABLET BY MOUTH DAILY. INDICATIONS: TYPE 2 DIABETES MELLITUS    ALPRAZolam (XANAX) 0.25 mg tablet Take 1 Tablet by mouth two (2) times daily as needed for Anxiety or Sleep. Max Daily Amount: 0.5 mg.    glimepiride (AMARYL) 2 mg tablet Take 1 Tablet by mouth every morning. cyanocobalamin (VITAMIN B12) 1,000 mcg/mL injection 1 ML BY INTRAMUSCULAR ROUTE ONCE EVERY 2 WEEKS. INDICATIONS: INADEQUATE VITAMIN B12    flash glucose sensor (FreeStyle Epifanio 2 Sensor) kit Use to check your blood sugars throughout the day. atorvastatin (LIPITOR) 20 mg tablet Take 1 Tablet by mouth daily. Indications: high cholesterol    ondansetron (ZOFRAN ODT) 4 mg disintegrating tablet Take 1 Tablet by mouth every eight (8) hours as needed for Nausea or Nausea or Vomiting. Syringe with Needle, Disp, (Syringe 3cc/25Gx1\") 3 mL 25 gauge x 1\" syrg Use every other week with B12 injections    Blood-Glucose Meter monitoring kit Use to check blood sugars twice daily - patient using one touch verio strips but needs new meter    lancets (One Touch Delica) 33 gauge misc Use 1-4 times daily to monitor sugar    glucose blood VI test strips (OneTouch Verio test strips) strip Use 3-4 times daily to monitor glucose     No current facility-administered medications for this visit.           Patient Active Problem List   Diagnosis Code    Cervical stenosis of spinal canal M48.02    Sarcoidosis D86.9    Acute hyperkalemia E87.5 Recurrent nephrolithiasis N20.0    Type 2 diabetes mellitus with microalbuminuria, with long-term current use of insulin (HCC) E11.29, R80.9, Z79.4    Essential tremor G25.0    Anxiety F41.9    Hyperlipidemia, unspecified hyperlipidemia type E78.5    Attention deficit disorder, unspecified hyperactivity presence F98.8    Gastroesophageal reflux disease without esophagitis K21.9    Essential hypertension I10    Neuropathy G62.9    Fatigue R53.83    Memory loss R41.3    Mild episode of recurrent major depressive disorder (HCC) F33.0    Skin cyst L72.9    B12 deficiency E53.8    Monoclonal gammopathy D47.2    Hyponatremia E87.1    Abnormal liver CT R93.2    Pain in both feet M79.671, M79.672     Past Surgical History:   Procedure Laterality Date    HX CERVICAL FUSION      HX ORTHOPAEDIC      multi lumbar lami/fusions    HX TONSILLECTOMY      HX UROLOGICAL      Kidney Stones \"vacuumed\"    [place] UNLISTED PROCEDURE ABDOMEN PERITONEUM & OMENTUM  2013    liver biopsy      Social History     Tobacco Use    Smoking status: Former    Smokeless tobacco: Never    Tobacco comments:     occl cigar 5-6x/year   Substance Use Topics    Alcohol use: Not Currently     Comment: a few times a year      Family History   Problem Relation Age of Onset    Dementia Mother     Heart Attack Father     Cancer Neg Hx         Physical Exam   Vitals:       Visit Vitals  /86 (BP 1 Location: Left upper arm, BP Patient Position: Sitting)   Pulse 81   Temp 98 °F (36.7 °C) (Oral)   Resp 16   Ht 6' 2\" (1.88 m)   Wt 192 lb 3.2 oz (87.2 kg)   SpO2 96%   BMI 24.68 kg/m²        Physical Exam  Vitals reviewed. Constitutional:       General: He is not in acute distress. Appearance: Normal appearance. He is not ill-appearing, toxic-appearing or diaphoretic. HENT:      Right Ear: Tympanic membrane, ear canal and external ear normal. There is no impacted cerumen.       Left Ear: Tympanic membrane, ear canal and external ear normal. There is no impacted cerumen. Nose: Nose normal. No congestion. Mouth/Throat:      Mouth: Mucous membranes are moist.      Pharynx: Oropharynx is clear. No oropharyngeal exudate or posterior oropharyngeal erythema. Eyes:      General:         Right eye: No discharge. Left eye: No discharge. Extraocular Movements: Extraocular movements intact. Conjunctiva/sclera: Conjunctivae normal.   Cardiovascular:      Rate and Rhythm: Normal rate and regular rhythm. Pulses: Normal pulses. Heart sounds: Normal heart sounds. No murmur heard. No friction rub. Pulmonary:      Effort: Pulmonary effort is normal. No respiratory distress. Breath sounds: Normal breath sounds. No stridor. No wheezing or rales. Abdominal:      General: Bowel sounds are normal. There is no distension. Palpations: Abdomen is soft. Tenderness: There is no abdominal tenderness. There is no right CVA tenderness, left CVA tenderness or guarding. Musculoskeletal:         General: No swelling or tenderness. Normal range of motion. Cervical back: Normal range of motion. No rigidity or tenderness. Skin:     General: Skin is warm and dry. Neurological:      Mental Status: He is alert and oriented to person, place, and time.    Psychiatric:         Mood and Affect: Mood normal.         Behavior: Behavior normal.

## 2023-04-19 ENCOUNTER — VIRTUAL VISIT (OUTPATIENT)
Dept: INTERNAL MEDICINE CLINIC | Age: 58
End: 2023-04-19

## 2023-04-19 DIAGNOSIS — R80.9 TYPE 2 DIABETES MELLITUS WITH MICROALBUMINURIA, WITH LONG-TERM CURRENT USE OF INSULIN (HCC): Primary | ICD-10-CM

## 2023-04-19 DIAGNOSIS — E11.29 TYPE 2 DIABETES MELLITUS WITH MICROALBUMINURIA, WITH LONG-TERM CURRENT USE OF INSULIN (HCC): Primary | ICD-10-CM

## 2023-04-19 DIAGNOSIS — Z79.4 TYPE 2 DIABETES MELLITUS WITH MICROALBUMINURIA, WITH LONG-TERM CURRENT USE OF INSULIN (HCC): Primary | ICD-10-CM

## 2023-04-19 LAB
25(OH)D3 SERPL-MCNC: 19.9 NG/ML (ref 30–100)
FOLATE SERPL-MCNC: 11.5 NG/ML (ref 5–21)
TSH SERPL DL<=0.05 MIU/L-ACNC: 1.19 UIU/ML (ref 0.36–3.74)
VIT B12 SERPL-MCNC: 1017 PG/ML (ref 193–986)

## 2023-04-19 NOTE — PROGRESS NOTES
Pharmacy Progress Note - Telephone Encounter - Diabetes Management    Assessment / Plan:   Diabetes Management:  - Per ADA guidelines, Pt's A1c {IS/IS NOT:03012} at goal of < ***.   - Current SMBG(s)/CGM trend         Follow up:      S/O: Tyler Pruitt is a 63 y.o. male referred by Dr. Olayinka Griffiths MD for diabetes management. Outgoing call was placed to patient (2 identifiers used) to follow up on diabetes. Current diabetes regimen include(s):    - Lantus 22 units once daily  - Trulicity 3mg once weekly  - Glimepiride 2mg once daily in the morning  - Humalog   <150 -- 4 units  150-200 -- 5 units  201-250 -- 6 units  251-300 -- 7 units  >301 -- 8 units    ROS:  Today, Pt endorses:  - {Symptoms of Hyperglycemia:63483:::1}  - {Symptoms of Hypoglycemia:38460:::1}    Blood Glucose Monitoring (BGM) or CGM:      Nutrition:      Physical Activity:       Last eye exam:  ***    The ASCVD Risk score (Ho DK, et al., 2019) failed to calculate for the following reasons: The valid total cholesterol range is 130 to 320 mg/dL     Vitals:   Wt Readings from Last 3 Encounters:   04/18/23 192 lb 3.2 oz (87.2 kg)   04/10/23 195 lb (88.5 kg)   01/10/23 196 lb 12.8 oz (89.3 kg)     BP Readings from Last 3 Encounters:   04/18/23 130/86   04/10/23 138/75   01/10/23 130/84     Pulse Readings from Last 3 Encounters:   04/18/23 81   04/10/23 (!) 58   01/10/23 85       Lab Results   Component Value Date/Time    Hemoglobin A1c 6.8 (H) 02/23/2021 03:30 AM    Hemoglobin A1c 7.3 (H) 05/19/2010 04:14 PM    Hemoglobin A1c (POC) 9.2 04/10/2023 11:42 AM    Hemoglobin A1c (POC) 9.3 11/22/2022 11:39 AM    Hemoglobin A1c (POC) 8.9 04/06/2022 10:51 AM    Glucose 142 (H) 04/17/2023 01:44 PM    Glucose (POC) 102 (H) 03/01/2021 05:02 PM    Microalbumin/Creat ratio (mg/g creat) 120 (H) 03/09/2021 12:12 PM    Microalbumin,urine random 17.70 03/09/2021 12:12 PM    LDL, calculated 60 04/17/2023 01:44 PM    LDL, calculated 131.6 (H) 03/09/2021 12:12 PM    Creatinine (POC) 0.9 05/12/2016 06:53 AM    Creatinine 1.39 (H) 04/17/2023 01:44 PM         Estimated Creatinine Clearance: 68.2 mL/min (A) (based on SCr of 1.39 mg/dL (H)). Medication reconciliation was completed during the visit. There are no discontinued medications. Patient verbalized understanding of the information presented and all of the patients questions were answered. AVS available for patient to read in [employer]. Patient advised to call the office with any additional questions or concerns.     Thank you for the consult,  Marcos Brito, PharmD, BCPS, CDCES      {[facility]ail Pharm[facility] [employer]

## 2023-04-20 DIAGNOSIS — E55.9 VITAMIN D DEFICIENCY: Primary | ICD-10-CM

## 2023-04-20 RX ORDER — ACETAMINOPHEN 500 MG
2000 TABLET ORAL 2 TIMES DAILY
Qty: 180 CAPSULE | Refills: 2 | Status: SHIPPED | OUTPATIENT
Start: 2023-04-20 | End: 2023-07-19

## 2023-04-20 NOTE — PROGRESS NOTES
Spoke with patient, patient voiced understanding of lab results and will start medication, please send Vit D to pharmacy.

## 2023-04-21 NOTE — PROGRESS NOTES
[employer] message sent. Creatinine 1.39. GFR 59. CMP normal except for alk phos 131. CMP normal.  LDL 60. HDL 22.  triglyceride 264.   PSA negative    CKD3, monitor  Monitor alk phos  LDL at goal

## 2023-05-09 ENCOUNTER — OFFICE VISIT (OUTPATIENT)
Age: 58
End: 2023-05-09
Payer: COMMERCIAL

## 2023-05-09 VITALS
OXYGEN SATURATION: 98 % | DIASTOLIC BLOOD PRESSURE: 78 MMHG | SYSTOLIC BLOOD PRESSURE: 130 MMHG | WEIGHT: 196.5 LBS | TEMPERATURE: 97.9 F | HEIGHT: 74 IN | RESPIRATION RATE: 16 BRPM | BODY MASS INDEX: 25.22 KG/M2 | HEART RATE: 90 BPM

## 2023-05-09 DIAGNOSIS — E11.42 DIABETIC POLYNEUROPATHY ASSOCIATED WITH TYPE 2 DIABETES MELLITUS (HCC): ICD-10-CM

## 2023-05-09 DIAGNOSIS — G25.0 BENIGN ESSENTIAL TREMOR: Primary | ICD-10-CM

## 2023-05-09 PROCEDURE — 3075F SYST BP GE 130 - 139MM HG: CPT | Performed by: PSYCHIATRY & NEUROLOGY

## 2023-05-09 PROCEDURE — 3078F DIAST BP <80 MM HG: CPT | Performed by: PSYCHIATRY & NEUROLOGY

## 2023-05-09 PROCEDURE — 99204 OFFICE O/P NEW MOD 45 MIN: CPT | Performed by: PSYCHIATRY & NEUROLOGY

## 2023-05-09 RX ORDER — PROPRANOLOL HYDROCHLORIDE 120 MG/1
120 CAPSULE, EXTENDED RELEASE ORAL DAILY
Qty: 90 CAPSULE | Refills: 1 | Status: SHIPPED | OUTPATIENT
Start: 2023-05-09

## 2023-05-09 RX ORDER — INSULIN LISPRO 100 [IU]/ML
INJECTION, SOLUTION INTRAVENOUS; SUBCUTANEOUS
COMMUNITY
Start: 2023-05-09

## 2023-05-09 ASSESSMENT — PATIENT HEALTH QUESTIONNAIRE - PHQ9
SUM OF ALL RESPONSES TO PHQ QUESTIONS 1-9: 0
SUM OF ALL RESPONSES TO PHQ9 QUESTIONS 1 & 2: 0
2. FEELING DOWN, DEPRESSED OR HOPELESS: 0
1. LITTLE INTEREST OR PLEASURE IN DOING THINGS: 0
SUM OF ALL RESPONSES TO PHQ QUESTIONS 1-9: 0

## 2023-05-09 NOTE — PATIENT INSTRUCTIONS
relieve symptoms. This is done by treating the health problem that's causing it. For example, if you have diabetes, keeping your blood sugar within your target range may help. Or maybe your body lacks certain vitamins caused by drinking too much alcohol. In that case, treatment may include eating a healthy diet, taking vitamins, and stopping alcohol use. You may have physical therapy. This can increase muscle strength and help build muscle control. Over-the-counter medicine can relieve mild nerve pain. Your doctor may also prescribe medicine to help with severe pain, numbness, tingling, and weakness. If you have neuropathy in your feet, it's a good idea to have them checked during each office visit. This can help prevent problems. Some people find that physical therapy, acupuncture, or transcutaneous electrical nerve stimulation (TENS) helps relieve pain. Follow-up care is a key part of your treatment and safety. Be sure to make and go to all appointments, and call your doctor if you are having problems. It's also a good idea to know your test results and keep a list of the medicines you take. Where can you learn more? Go to http://www.woods.com/ and enter P130 to learn more about \"Learning About Peripheral Neuropathy. \"  Current as of: August 25, 2022               Content Version: 13.6  © 2006-2023 [employer]. Care instructions adapted under license by [facility] ([facility]). If you have questions about a medical condition or this instruction, always ask your healthcare professional. [place] [id number] any warranty or liability for your use of this information.

## 2023-05-09 NOTE — PROGRESS NOTES
Chief Complaint   Patient presents with    New Patient     Patient referred by PCP, Dr. Greer Trimble for tremors. Patient reports he has tremors in both hands but mainly in the left hand and have gotten worse over the last year or so.  Patient states he was diagnosed about 15 years ago for essential tremors by neurologist but patient can not remember name of neurologist.        /78   Pulse 90   Temp 97.9 °F (36.6 °C) (Oral)   Resp 16   Ht 6' 2\" (1.88 m)   Wt 196 lb 8 oz (89.1 kg)   SpO2 98%   BMI 25.23 kg/m²

## 2023-05-09 NOTE — PROGRESS NOTES
NEUROLOGY CLINIC NOTE    Patient ID:  Ace Barber  [id number]  80 y.o.  1965    Date of Consultation:  May 9, 2023    Referring Physician: Bryon Hemphill, *     Reason for Consultation:  tremor    Chief Complaint   Patient presents with    New Patient     Patient referred by PCP, Dr. Samantha Mart for tremors. Patient reports he has tremors in both hands but mainly in the left hand and have gotten worse over the last year or so.  Patient states he was diagnosed about 15 years ago for essential tremors by neurologist but patient can not remember name of neurologist.        History of Present Illness:     Patient Active Problem List    Diagnosis Date Noted    Pain in both feet 11/22/2022    Abnormal liver CT 06/24/2022    Hyponatremia 01/13/2022    B12 deficiency 11/02/2021    Skin cyst 11/02/2021    Monoclonal gammopathy 11/02/2021    Memory loss 10/03/2021    Mild episode of recurrent major depressive disorder ([address] 10/03/2021    Fatigue 10/03/2021    Neuropathy 10/03/2021    Essential hypertension 03/09/2021    Hyperlipidemia, unspecified hyperlipidemia type 03/09/2021    Essential tremor 03/09/2021    Attention deficit disorder, unspecified hyperactivity presence 03/09/2021    Anxiety 03/09/2021    Type 2 diabetes mellitus with microalbuminuria, with long-term current use of insulin ([address] 03/09/2021    Gastroesophageal reflux disease without esophagitis 03/09/2021    Acute hyperkalemia 02/23/2021    Recurrent nephrolithiasis 02/23/2021    Sarcoidosis 02/23/2021    Cervical stenosis of spinal canal 05/13/2016     Past Medical History:   Diagnosis Date    Cervical stenosis of spinal canal 5/13/2016    Diabetes ([address]     Hypertension       Past Surgical History:   Procedure Laterality Date    CERVICAL FUSION      ORTHOPEDIC SURGERY      multi lumbar lami/fusions    [place] UNLISTED PROCEDURE ABDOMEN PERITONEUM & OMENTUM  2013    liver biopsy    TONSILLECTOMY      UROLOGICAL SURGERY      Kidney Stones

## 2023-05-10 ENCOUNTER — OFFICE VISIT (OUTPATIENT)
Age: 58
End: 2023-05-10

## 2023-05-10 VITALS
DIASTOLIC BLOOD PRESSURE: 80 MMHG | OXYGEN SATURATION: 96 % | RESPIRATION RATE: 14 BRPM | BODY MASS INDEX: 25.28 KG/M2 | HEART RATE: 88 BPM | WEIGHT: 197 LBS | SYSTOLIC BLOOD PRESSURE: 118 MMHG | HEIGHT: 74 IN

## 2023-05-10 DIAGNOSIS — E11.29 TYPE 2 DIABETES MELLITUS WITH MICROALBUMINURIA, WITH LONG-TERM CURRENT USE OF INSULIN (HCC): ICD-10-CM

## 2023-05-10 DIAGNOSIS — R42 DIZZINESS: ICD-10-CM

## 2023-05-10 DIAGNOSIS — G25.0 ESSENTIAL TREMOR: ICD-10-CM

## 2023-05-10 DIAGNOSIS — Z79.4 TYPE 2 DIABETES MELLITUS WITH MICROALBUMINURIA, WITH LONG-TERM CURRENT USE OF INSULIN (HCC): ICD-10-CM

## 2023-05-10 DIAGNOSIS — F33.0 MILD EPISODE OF RECURRENT MAJOR DEPRESSIVE DISORDER (HCC): Primary | ICD-10-CM

## 2023-05-10 DIAGNOSIS — R80.9 TYPE 2 DIABETES MELLITUS WITH MICROALBUMINURIA, WITH LONG-TERM CURRENT USE OF INSULIN (HCC): ICD-10-CM

## 2023-05-10 DIAGNOSIS — F98.8 ATTENTION DEFICIT DISORDER, UNSPECIFIED HYPERACTIVITY PRESENCE: ICD-10-CM

## 2023-05-10 PROCEDURE — 3079F DIAST BP 80-89 MM HG: CPT | Performed by: INTERNAL MEDICINE

## 2023-05-10 PROCEDURE — 99214 OFFICE O/P EST MOD 30 MIN: CPT | Performed by: INTERNAL MEDICINE

## 2023-05-10 PROCEDURE — 3074F SYST BP LT 130 MM HG: CPT | Performed by: INTERNAL MEDICINE

## 2023-05-10 RX ORDER — BUPROPION HYDROCHLORIDE 150 MG/1
150 TABLET ORAL EVERY MORNING
COMMUNITY

## 2023-05-10 ASSESSMENT — PATIENT HEALTH QUESTIONNAIRE - PHQ9
SUM OF ALL RESPONSES TO PHQ QUESTIONS 1-9: 0
2. FEELING DOWN, DEPRESSED OR HOPELESS: 0
1. LITTLE INTEREST OR PLEASURE IN DOING THINGS: 0
SUM OF ALL RESPONSES TO PHQ9 QUESTIONS 1 & 2: 0
SUM OF ALL RESPONSES TO PHQ QUESTIONS 1-9: 0

## 2023-05-10 NOTE — ASSESSMENT & PLAN NOTE
One morning it was in the 60s   Not as consistent with meal time insulin   Follow up with Citlalli Correia current meds for now  Current diabetes regimen include(s):   - Lantus 22 units once daily  - Trulicity 3mg once weekly  - Glimepiride 2mg once daily in the morning  - Humalog   <150 -- 4 units  150-200 -- 5 units  201-250 -- 6 units  251-300 -- 7 units  >301 -- 8 units

## 2023-05-10 NOTE — PROGRESS NOTES
Note   Chief Complaint   depression    Jerri Land is a 63 y.o. male     1. Mild episode of recurrent major depressive disorder (HCC)  Assessment & Plan:   No SE with wellbutrin increase  Higher dose has helped, sleeping better   Cont Wellbutrin 450XL daily  Previous medications: Zoloft 50 (we had stopped it when we were trying to figure out what medications were causing him fatigue, did feel better after stopping it)  2. Essential tremor  Assessment & Plan:  Saw neuro yesterday  They Increased propranolol long-acting to 120 mg daily, hasn't started yet  3. Dizziness  Assessment & Plan:  Saw NP   Dizziness resolved a couple days after his last visit with the medication   Haven't had issues since  monitor  4. Type 2 diabetes mellitus with microalbuminuria, with long-term current use of insulin ([facility])  Assessment & Plan:   One morning it was in the 60s   Not as consistent with meal time insulin   Follow up with Prescott Chi current meds for now  Current diabetes regimen include(s):   - Lantus 22 units once daily  - Trulicity 3mg once weekly  - Glimepiride 2mg once daily in the morning  - Humalog   <150 -- 4 units  150-200 -- 5 units  201-250 -- 6 units  251-300 -- 7 units  >301 -- 8 units  5. Attention deficit disorder, unspecified hyperactivity presence  Assessment & Plan:  Still hasn't been able to get med       Benefits, risks, possible drug interactions, and side effects of all new medications were reviewed with the patient. Pt verbalized understanding. Return to clinic:  as needed  [id number]    An electronic signature was used to authenticate this note.   Marjorie Thurman MD  [facility]  5/10/2023    Future Appointments   Date Time Provider Des [place]   5/17/2023 11:30 AM MARCO ANTONIO Hernandez [facility] BS AMB   9/11/2023  9:20 AM Jose Davis MD [id number] BS AMB        Objective   Vitals:       /80   Pulse 88   Resp 14   Ht 6' 2\" (1.88 m)

## 2023-05-10 NOTE — ASSESSMENT & PLAN NOTE
Saw NP   Dizziness resolved a couple days after his last visit with the medication   Haven't had issues since  monitor

## 2023-05-10 NOTE — ASSESSMENT & PLAN NOTE
No SE with wellbutrin increase  Higher dose has helped, sleeping better   Cont Wellbutrin 450XL daily  Previous medications: Zoloft 50 (we had stopped it when we were trying to figure out what medications were causing him fatigue, did feel better after stopping it)

## 2023-05-17 ENCOUNTER — SCHEDULED TELEPHONE ENCOUNTER (OUTPATIENT)
Age: 58
End: 2023-05-17

## 2023-05-17 DIAGNOSIS — E11.29 TYPE 2 DIABETES MELLITUS WITH MICROALBUMINURIA, WITH LONG-TERM CURRENT USE OF INSULIN (HCC): Primary | ICD-10-CM

## 2023-05-17 DIAGNOSIS — Z79.4 TYPE 2 DIABETES MELLITUS WITH MICROALBUMINURIA, WITH LONG-TERM CURRENT USE OF INSULIN (HCC): Primary | ICD-10-CM

## 2023-05-17 DIAGNOSIS — R80.9 TYPE 2 DIABETES MELLITUS WITH MICROALBUMINURIA, WITH LONG-TERM CURRENT USE OF INSULIN (HCC): Primary | ICD-10-CM

## 2023-05-17 NOTE — PROGRESS NOTES
Pharmacy Progress Note - Telephone Encounter - Diabetes Management    Assessment / Plan:   Diabetes Management:  - Per ADA guidelines, Pt's A1c is not at goal of < 7%. - Current SMBG(s)/CGM trend improving but not quite at goal TIR >70%  - Encouraged patient to use 1 to 2 extra units of Humalog if sugar was higher and was eating a normal- higher carb meal  - Continue all other meds and follow up in 1 month to see how things are going        Follow up: 6/14/2023       S/O: Matthew Espinosa is a 63 y.o. male referred by Dr. Janeth Martínez MD for diabetes management. Outgoing call was placed to patient (2 identifiers used) to follow up on diabetes. Current diabetes regimen include(s):    - Lantus 22 units once daily  - Trulicity 3mg once weekly  - Glimepiride 2mg once daily in the morning  - Humalog   <150 -- 4 units  150-200 -- 5 units  201-250 -- 6 units  251-300 -- 7 units  >301 -- 8 units    ROS:  Today, Pt endorses:  - Symptoms of Hyperglycemia: none  - Symptoms of Hypoglycemia: none    Blood Glucose Monitoring (BGM) or CGM:        The ASCVD Risk score (Shade AMARAL, et al., 2019) failed to calculate for the following reasons: The valid total cholesterol range is 130 to 320 mg/dL     Vitals: Wt Readings from Last 3 Encounters:   05/10/23 197 lb (89.4 kg)   05/09/23 196 lb 8 oz (89.1 kg)   04/18/23 192 lb 3.2 oz (87.2 kg)     BP Readings from Last 3 Encounters:   05/10/23 118/80   05/09/23 130/78   04/18/23 130/86     Pulse Readings from Last 3 Encounters:   05/10/23 88   05/09/23 90   04/18/23 81         Lab Results   Component Value Date/Time    UTB1CFDD 9.2 04/10/2023 11:42 AM    PGI5GBRT 9.3 11/22/2022 11:39 AM    DXN6IXHK 8.9 04/06/2022 10:51 AM     CrCl cannot be calculated (Unknown ideal weight. ). Medication reconciliation was completed during the visit. There are no discontinued medications.     Patient verbalized understanding of the information presented and all of the patients

## 2023-06-04 ENCOUNTER — PATIENT MESSAGE (OUTPATIENT)
Age: 58
End: 2023-06-04

## 2023-06-04 DIAGNOSIS — F41.9 ANXIETY: Primary | ICD-10-CM

## 2023-06-04 DIAGNOSIS — G25.0 BENIGN ESSENTIAL TREMOR: ICD-10-CM

## 2023-06-06 RX ORDER — GLIMEPIRIDE 2 MG/1
2 TABLET ORAL
Qty: 90 TABLET | Refills: 3 | Status: SHIPPED | OUTPATIENT
Start: 2023-06-06

## 2023-06-06 RX ORDER — ONDANSETRON 4 MG/1
4 TABLET, ORALLY DISINTEGRATING ORAL EVERY 8 HOURS PRN
Qty: 90 TABLET | Refills: 3 | Status: SHIPPED | OUTPATIENT
Start: 2023-06-06

## 2023-06-06 RX ORDER — OMEPRAZOLE 20 MG/1
20 CAPSULE, DELAYED RELEASE ORAL DAILY
Qty: 90 CAPSULE | Refills: 3 | Status: SHIPPED | OUTPATIENT
Start: 2023-06-06

## 2023-06-06 RX ORDER — PROPRANOLOL HYDROCHLORIDE 120 MG/1
120 CAPSULE, EXTENDED RELEASE ORAL DAILY
Qty: 90 CAPSULE | Refills: 3 | Status: SHIPPED | OUTPATIENT
Start: 2023-06-06

## 2023-06-06 RX ORDER — BUPROPION HYDROCHLORIDE 300 MG/1
300 TABLET ORAL EVERY MORNING
Qty: 90 TABLET | Refills: 3 | Status: SHIPPED | OUTPATIENT
Start: 2023-06-06

## 2023-06-06 RX ORDER — INSULIN GLARGINE 100 [IU]/ML
22 INJECTION, SOLUTION SUBCUTANEOUS EVERY EVENING
Qty: 19.8 ML | Refills: 3 | Status: SHIPPED | OUTPATIENT
Start: 2023-06-06

## 2023-06-06 RX ORDER — BUPROPION HYDROCHLORIDE 150 MG/1
150 TABLET ORAL EVERY MORNING
Qty: 90 TABLET | Refills: 3 | Status: SHIPPED | OUTPATIENT
Start: 2023-06-06

## 2023-06-06 RX ORDER — LISINOPRIL 20 MG/1
20 TABLET ORAL DAILY
Qty: 90 TABLET | Refills: 3 | Status: SHIPPED | OUTPATIENT
Start: 2023-06-06

## 2023-06-06 RX ORDER — ALPRAZOLAM 0.25 MG/1
0.25 TABLET ORAL 2 TIMES DAILY PRN
Qty: 60 TABLET | Refills: 0 | Status: SHIPPED | OUTPATIENT
Start: 2023-06-06 | End: 2023-07-06

## 2023-06-06 RX ORDER — POTASSIUM CITRATE 15 MEQ/1
15 TABLET, EXTENDED RELEASE ORAL 2 TIMES DAILY
Qty: 180 TABLET | Refills: 3 | Status: SHIPPED | OUTPATIENT
Start: 2023-06-06

## 2023-06-06 RX ORDER — INSULIN LISPRO 100 [IU]/ML
INJECTION, SOLUTION INTRAVENOUS; SUBCUTANEOUS
Qty: 21.6 ML | Refills: 0 | Status: SHIPPED | OUTPATIENT
Start: 2023-06-06

## 2023-06-06 RX ORDER — DULAGLUTIDE 3 MG/.5ML
INJECTION, SOLUTION SUBCUTANEOUS
Qty: 12 ADJUSTABLE DOSE PRE-FILLED PEN SYRINGE | Refills: 3 | Status: SHIPPED | OUTPATIENT
Start: 2023-06-06

## 2023-06-06 RX ORDER — ATORVASTATIN CALCIUM 20 MG/1
20 TABLET, FILM COATED ORAL DAILY
Qty: 90 TABLET | Refills: 3 | Status: SHIPPED | OUTPATIENT
Start: 2023-06-06

## 2023-06-06 NOTE — TELEPHONE ENCOUNTER
From: Bere Blackwood  To: Dr. Bobby Gary: 2/0/4242 11:23 AM EDT  Subject: Refills before you leave    The following need additional refills until I can find a new doctor. I've noted how many refills I currently have.   Insulin Glargine, 3 left  Potassium Citrate, 3 left  Obdansetron, 0 left  Atorvastain, 1 left  Glimepiride, 8 left  Humalog, 2 left  Propranolol, 5 left

## 2023-06-28 ENCOUNTER — SCHEDULED TELEPHONE ENCOUNTER (OUTPATIENT)
Age: 58
End: 2023-06-28

## 2023-06-28 DIAGNOSIS — Z79.4 TYPE 2 DIABETES MELLITUS WITH MICROALBUMINURIA, WITH LONG-TERM CURRENT USE OF INSULIN (HCC): Primary | ICD-10-CM

## 2023-06-28 DIAGNOSIS — E11.29 TYPE 2 DIABETES MELLITUS WITH MICROALBUMINURIA, WITH LONG-TERM CURRENT USE OF INSULIN (HCC): Primary | ICD-10-CM

## 2023-06-28 DIAGNOSIS — R80.9 TYPE 2 DIABETES MELLITUS WITH MICROALBUMINURIA, WITH LONG-TERM CURRENT USE OF INSULIN (HCC): Primary | ICD-10-CM

## 2023-07-26 ENCOUNTER — SCHEDULED TELEPHONE ENCOUNTER (OUTPATIENT)
Age: 58
End: 2023-07-26

## 2023-07-26 DIAGNOSIS — E11.29 TYPE 2 DIABETES MELLITUS WITH MICROALBUMINURIA, WITH LONG-TERM CURRENT USE OF INSULIN (HCC): Primary | ICD-10-CM

## 2023-07-26 DIAGNOSIS — Z79.4 TYPE 2 DIABETES MELLITUS WITH MICROALBUMINURIA, WITH LONG-TERM CURRENT USE OF INSULIN (HCC): Primary | ICD-10-CM

## 2023-07-26 DIAGNOSIS — R80.9 TYPE 2 DIABETES MELLITUS WITH MICROALBUMINURIA, WITH LONG-TERM CURRENT USE OF INSULIN (HCC): Primary | ICD-10-CM

## 2023-07-26 RX ORDER — CYANOCOBALAMIN 1000 UG/ML
INJECTION, SOLUTION INTRAMUSCULAR; SUBCUTANEOUS
COMMUNITY
Start: 2023-07-13

## 2023-07-26 RX ORDER — PEN NEEDLE, DIABETIC 32GX 5/32"
NEEDLE, DISPOSABLE MISCELLANEOUS
COMMUNITY
Start: 2023-07-13

## 2023-07-26 RX ORDER — SODIUM FLUORIDE 6 MG/ML
PASTE, DENTIFRICE DENTAL
COMMUNITY
Start: 2023-07-13

## 2023-07-26 RX ORDER — EMPAGLIFLOZIN 10 MG/1
TABLET, FILM COATED ORAL
COMMUNITY
Start: 2023-07-13

## 2023-07-26 RX ORDER — CHOLECALCIFEROL (VITAMIN D3) 25 MCG
CAPSULE ORAL DAILY
COMMUNITY
Start: 2023-07-13 | End: 2023-07-26

## 2023-08-18 DIAGNOSIS — F41.9 ANXIETY: ICD-10-CM

## 2023-08-21 DIAGNOSIS — F41.9 ANXIETY: ICD-10-CM

## 2023-08-21 RX ORDER — ALPRAZOLAM 0.25 MG/1
TABLET ORAL
Qty: 60 TABLET | Refills: 0 | Status: SHIPPED | OUTPATIENT
Start: 2023-08-21 | End: 2023-08-21 | Stop reason: SDUPTHER

## 2023-08-21 RX ORDER — CYANOCOBALAMIN 1000 UG/ML
INJECTION, SOLUTION INTRAMUSCULAR; SUBCUTANEOUS
Qty: 6 ML | Refills: 3 | Status: SHIPPED | OUTPATIENT
Start: 2023-08-21

## 2023-08-21 RX ORDER — INSULIN LISPRO 100 [IU]/ML
INJECTION, SOLUTION INTRAVENOUS; SUBCUTANEOUS
Qty: 21.6 ML | Refills: 0 | Status: SHIPPED | OUTPATIENT
Start: 2023-08-21

## 2023-08-21 RX ORDER — ALPRAZOLAM 0.25 MG/1
TABLET ORAL
Qty: 60 TABLET | Refills: 0 | Status: SHIPPED | OUTPATIENT
Start: 2023-08-21 | End: 2023-09-21

## 2023-08-23 ENCOUNTER — SCHEDULED TELEPHONE ENCOUNTER (OUTPATIENT)
Age: 58
End: 2023-08-23

## 2023-08-23 DIAGNOSIS — E11.29 TYPE 2 DIABETES MELLITUS WITH OTHER DIABETIC KIDNEY COMPLICATION (HCC): Primary | ICD-10-CM

## 2023-08-23 NOTE — PROGRESS NOTES
Pharmacy Progress Note - Telephone Encounter - Diabetes Management    Assessment / Plan:   Diabetes Management:  - Per ADA guidelines, Pt's A1c is not at goal of < 7%. - Current SMBG(s)/CGM trend continues to improve and is near goal TIR >70%  - Encouraged patient to be diligent with his mealtime insulin doses as this is where he thinks things seem to get off track  - Patient to call me if any issues in between now and when establishing with new PCP        Follow up: PRN     S/O: Gamaliel Torrez is a 63 y.o. male referred by Dr. Harini Ni MD for diabetes management. Outgoing call was placed to patient (2 identifiers used) to follow up on diabetes. Patient is scheduled to see his new PCP on 9/20/23    Current diabetes regimen include(s):    - Lantus 20 units once daily  - Trulicity 3mg once weekly   - Glimepiride 2mg once daily in the morning  - Humalog   <150 -- 4 units  150-200 -- 5 units  201-250 -- 6 units  251-300 -- 7 units  >301 -- 8 units    ROS:  Today, Pt endorses:  - Symptoms of Hyperglycemia: none  - Symptoms of Hypoglycemia: none    Blood Glucose Monitoring (BGM) or CGM:        The ASCVD Risk score (Shdae AMARAL, et al., 2019) failed to calculate for the following reasons: The valid total cholesterol range is 130 to 320 mg/dL     Vitals: Wt Readings from Last 3 Encounters:   05/10/23 197 lb (89.4 kg)   05/09/23 196 lb 8 oz (89.1 kg)   04/18/23 192 lb 3.2 oz (87.2 kg)     BP Readings from Last 3 Encounters:   05/10/23 118/80   05/09/23 130/78   04/18/23 130/86     Pulse Readings from Last 3 Encounters:   05/10/23 88   05/09/23 90   04/18/23 81         Lab Results   Component Value Date/Time    ZPF2AAYG 9.2 04/10/2023 11:42 AM    FAD4RCBE 9.3 11/22/2022 11:39 AM    LBK0KJUV 8.9 04/06/2022 10:51 AM     CrCl cannot be calculated (Unknown ideal weight. ). Medication reconciliation was completed during the visit. There are no discontinued medications.     Patient verbalized understanding

## 2023-09-11 ENCOUNTER — OFFICE VISIT (OUTPATIENT)
Age: 58
End: 2023-09-11
Payer: COMMERCIAL

## 2023-09-11 VITALS
DIASTOLIC BLOOD PRESSURE: 74 MMHG | BODY MASS INDEX: 25.67 KG/M2 | TEMPERATURE: 97.3 F | RESPIRATION RATE: 16 BRPM | HEART RATE: 76 BPM | OXYGEN SATURATION: 97 % | WEIGHT: 200 LBS | HEIGHT: 74 IN | SYSTOLIC BLOOD PRESSURE: 120 MMHG

## 2023-09-11 DIAGNOSIS — G25.0 BENIGN ESSENTIAL TREMOR: Primary | ICD-10-CM

## 2023-09-11 DIAGNOSIS — E11.42 DIABETIC POLYNEUROPATHY ASSOCIATED WITH TYPE 2 DIABETES MELLITUS (HCC): ICD-10-CM

## 2023-09-11 PROCEDURE — 99214 OFFICE O/P EST MOD 30 MIN: CPT | Performed by: PSYCHIATRY & NEUROLOGY

## 2023-09-11 PROCEDURE — 3074F SYST BP LT 130 MM HG: CPT | Performed by: PSYCHIATRY & NEUROLOGY

## 2023-09-11 PROCEDURE — 3078F DIAST BP <80 MM HG: CPT | Performed by: PSYCHIATRY & NEUROLOGY

## 2023-09-11 RX ORDER — PROPRANOLOL HYDROCHLORIDE 160 MG/1
160 CAPSULE, EXTENDED RELEASE ORAL DAILY
Qty: 30 CAPSULE | Refills: 5 | Status: SHIPPED | OUTPATIENT
Start: 2023-09-11

## 2023-09-11 NOTE — PROGRESS NOTES
structured exercises and fall precautions. Patient also has history of monoclonal gammopathy which can contribute to the history of the peripheral neuropathy. Advised follow up with hematology. Patient not currently having significant neuropathic discomforts. Continue to use of voltaren gel. All questions and concerns were answered. This note was created using voice recognition software. Despite editing, there may be syntax errors.

## 2023-09-19 SDOH — HEALTH STABILITY: PHYSICAL HEALTH: ON AVERAGE, HOW MANY DAYS PER WEEK DO YOU ENGAGE IN MODERATE TO STRENUOUS EXERCISE (LIKE A BRISK WALK)?: 1 DAY

## 2023-09-19 SDOH — HEALTH STABILITY: PHYSICAL HEALTH: ON AVERAGE, HOW MANY MINUTES DO YOU ENGAGE IN EXERCISE AT THIS LEVEL?: 10 MIN

## 2023-09-20 ENCOUNTER — OFFICE VISIT (OUTPATIENT)
Dept: PRIMARY CARE CLINIC | Facility: CLINIC | Age: 58
End: 2023-09-20
Payer: COMMERCIAL

## 2023-09-20 VITALS
WEIGHT: 197 LBS | RESPIRATION RATE: 18 BRPM | BODY MASS INDEX: 25.28 KG/M2 | HEART RATE: 83 BPM | HEIGHT: 74 IN | SYSTOLIC BLOOD PRESSURE: 136 MMHG | TEMPERATURE: 97.6 F | DIASTOLIC BLOOD PRESSURE: 88 MMHG | OXYGEN SATURATION: 98 %

## 2023-09-20 DIAGNOSIS — E78.5 HYPERLIPIDEMIA, UNSPECIFIED HYPERLIPIDEMIA TYPE: ICD-10-CM

## 2023-09-20 DIAGNOSIS — F41.9 ANXIETY: Primary | ICD-10-CM

## 2023-09-20 DIAGNOSIS — I10 PRIMARY HYPERTENSION: ICD-10-CM

## 2023-09-20 DIAGNOSIS — E55.9 VITAMIN D DEFICIENCY: ICD-10-CM

## 2023-09-20 DIAGNOSIS — E11.65 UNCONTROLLED TYPE 2 DIABETES MELLITUS WITH HYPERGLYCEMIA (HCC): ICD-10-CM

## 2023-09-20 DIAGNOSIS — Z12.11 SCREENING FOR COLON CANCER: ICD-10-CM

## 2023-09-20 PROCEDURE — 3075F SYST BP GE 130 - 139MM HG: CPT | Performed by: FAMILY MEDICINE

## 2023-09-20 PROCEDURE — 3079F DIAST BP 80-89 MM HG: CPT | Performed by: FAMILY MEDICINE

## 2023-09-20 PROCEDURE — 99204 OFFICE O/P NEW MOD 45 MIN: CPT | Performed by: FAMILY MEDICINE

## 2023-09-20 SDOH — ECONOMIC STABILITY: FOOD INSECURITY: WITHIN THE PAST 12 MONTHS, YOU WORRIED THAT YOUR FOOD WOULD RUN OUT BEFORE YOU GOT MONEY TO BUY MORE.: PATIENT DECLINED

## 2023-09-20 SDOH — ECONOMIC STABILITY: INCOME INSECURITY: HOW HARD IS IT FOR YOU TO PAY FOR THE VERY BASICS LIKE FOOD, HOUSING, MEDICAL CARE, AND HEATING?: PATIENT DECLINED

## 2023-09-20 SDOH — ECONOMIC STABILITY: HOUSING INSECURITY
IN THE LAST 12 MONTHS, WAS THERE A TIME WHEN YOU DID NOT HAVE A STEADY PLACE TO SLEEP OR SLEPT IN A SHELTER (INCLUDING NOW)?: PATIENT REFUSED

## 2023-09-20 SDOH — ECONOMIC STABILITY: FOOD INSECURITY: WITHIN THE PAST 12 MONTHS, THE FOOD YOU BOUGHT JUST DIDN'T LAST AND YOU DIDN'T HAVE MONEY TO GET MORE.: PATIENT DECLINED

## 2023-09-20 ASSESSMENT — ENCOUNTER SYMPTOMS: SHORTNESS OF BREATH: 0

## 2023-09-21 LAB
CREAT UR-MCNC: 87.1 MG/DL
MICROALBUMIN UR-MCNC: 2.06 MG/DL
MICROALBUMIN/CREAT UR-RTO: 24 MG/G (ref 0–30)

## 2023-09-27 LAB — DRUGS UR: NORMAL

## 2023-10-04 ENCOUNTER — PROCEDURE VISIT (OUTPATIENT)
Age: 58
End: 2023-10-04
Payer: COMMERCIAL

## 2023-10-04 DIAGNOSIS — E11.42 DIABETIC POLYNEUROPATHY ASSOCIATED WITH TYPE 2 DIABETES MELLITUS (HCC): Primary | ICD-10-CM

## 2023-10-04 PROCEDURE — 95886 MUSC TEST DONE W/N TEST COMP: CPT | Performed by: PSYCHIATRY & NEUROLOGY

## 2023-10-04 PROCEDURE — 95911 NRV CNDJ TEST 9-10 STUDIES: CPT | Performed by: PSYCHIATRY & NEUROLOGY

## 2023-11-14 RX ORDER — INSULIN LISPRO 100 [IU]/ML
INJECTION, SOLUTION INTRAVENOUS; SUBCUTANEOUS
OUTPATIENT
Start: 2023-11-14

## 2023-11-16 ENCOUNTER — TELEPHONE (OUTPATIENT)
Age: 58
End: 2023-11-16

## 2023-11-16 NOTE — TELEPHONE ENCOUNTER
PA request for omeprazole sent to previous provider who is no longer at this office.  PA is needed to be completed by new provider office

## 2023-11-25 ENCOUNTER — HOSPITAL ENCOUNTER (EMERGENCY)
Facility: HOSPITAL | Age: 58
Discharge: HOME | End: 2023-11-25
Attending: EMERGENCY MEDICINE
Payer: COMMERCIAL

## 2023-11-25 ENCOUNTER — APPOINTMENT (OUTPATIENT)
Facility: HOSPITAL | Age: 58
End: 2023-11-25
Payer: COMMERCIAL

## 2023-11-25 VITALS
RESPIRATION RATE: 16 BRPM | WEIGHT: 186.95 LBS | HEIGHT: 74 IN | SYSTOLIC BLOOD PRESSURE: 117 MMHG | TEMPERATURE: 97.9 F | OXYGEN SATURATION: 99 % | BODY MASS INDEX: 23.99 KG/M2 | DIASTOLIC BLOOD PRESSURE: 79 MMHG | HEART RATE: 90 BPM

## 2023-11-25 DIAGNOSIS — J11.1 INFLUENZA: Primary | ICD-10-CM

## 2023-11-25 DIAGNOSIS — R05.1 ACUTE COUGH: ICD-10-CM

## 2023-11-25 DIAGNOSIS — R11.2 NAUSEA AND VOMITING, UNSPECIFIED VOMITING TYPE: ICD-10-CM

## 2023-11-25 LAB
ALBUMIN SERPL-MCNC: 3.7 G/DL (ref 3.5–5)
ALBUMIN/GLOB SERPL: 0.8 (ref 1.1–2.2)
ALP SERPL-CCNC: 95 U/L (ref 45–117)
ALT SERPL-CCNC: 40 U/L (ref 12–78)
ANION GAP SERPL CALC-SCNC: 13 MMOL/L (ref 5–15)
AST SERPL-CCNC: 39 U/L (ref 15–37)
BASOPHILS # BLD: 0 K/UL (ref 0–0.1)
BASOPHILS NFR BLD: 0 % (ref 0–1)
BILIRUB SERPL-MCNC: 0.9 MG/DL (ref 0.2–1)
BUN SERPL-MCNC: 40 MG/DL (ref 6–20)
BUN/CREAT SERPL: 21 (ref 12–20)
CALCIUM SERPL-MCNC: 9.2 MG/DL (ref 8.5–10.1)
CHLORIDE SERPL-SCNC: 94 MMOL/L (ref 97–108)
CO2 SERPL-SCNC: 26 MMOL/L (ref 21–32)
COMMENT:: NORMAL
CREAT SERPL-MCNC: 1.88 MG/DL (ref 0.7–1.3)
DIFFERENTIAL METHOD BLD: ABNORMAL
EOSINOPHIL # BLD: 0 K/UL (ref 0–0.4)
EOSINOPHIL NFR BLD: 0 % (ref 0–7)
ERYTHROCYTE [DISTWIDTH] IN BLOOD BY AUTOMATED COUNT: 14.3 % (ref 11.5–14.5)
FLUAV AG NPH QL IA: POSITIVE
FLUBV AG NOSE QL IA: NEGATIVE
GLOBULIN SER CALC-MCNC: 4.8 G/DL (ref 2–4)
GLUCOSE SERPL-MCNC: 194 MG/DL (ref 65–100)
HCT VFR BLD AUTO: 49.7 % (ref 36.6–50.3)
HGB BLD-MCNC: 16.4 G/DL (ref 12.1–17)
IMM GRANULOCYTES # BLD AUTO: 0.1 K/UL (ref 0–0.04)
IMM GRANULOCYTES NFR BLD AUTO: 0 % (ref 0–0.5)
LYMPHOCYTES # BLD: 3.1 K/UL (ref 0.8–3.5)
LYMPHOCYTES NFR BLD: 25 % (ref 12–49)
MCH RBC QN AUTO: 27.3 PG (ref 26–34)
MCHC RBC AUTO-ENTMCNC: 33 G/DL (ref 30–36.5)
MCV RBC AUTO: 82.7 FL (ref 80–99)
MONOCYTES # BLD: 2 K/UL (ref 0–1)
MONOCYTES NFR BLD: 16 % (ref 5–13)
NEUTS SEG # BLD: 7.4 K/UL (ref 1.8–8)
NEUTS SEG NFR BLD: 59 % (ref 32–75)
NRBC # BLD: 0 K/UL (ref 0–0.01)
NRBC BLD-RTO: 0 PER 100 WBC
PLATELET # BLD AUTO: 314 K/UL (ref 150–400)
PMV BLD AUTO: 11.4 FL (ref 8.9–12.9)
POTASSIUM SERPL-SCNC: 3.5 MMOL/L (ref 3.5–5.1)
PROT SERPL-MCNC: 8.5 G/DL (ref 6.4–8.2)
RBC # BLD AUTO: 6.01 M/UL (ref 4.1–5.7)
SODIUM SERPL-SCNC: 133 MMOL/L (ref 136–145)
SPECIMEN HOLD: NORMAL
WBC # BLD AUTO: 12.6 K/UL (ref 4.1–11.1)

## 2023-11-25 PROCEDURE — 85025 COMPLETE CBC W/AUTO DIFF WBC: CPT

## 2023-11-25 PROCEDURE — 80053 COMPREHEN METABOLIC PANEL: CPT

## 2023-11-25 PROCEDURE — 36415 COLL VENOUS BLD VENIPUNCTURE: CPT

## 2023-11-25 PROCEDURE — 2580000003 HC RX 258: Performed by: PHYSICIAN ASSISTANT

## 2023-11-25 PROCEDURE — 6370000000 HC RX 637 (ALT 250 FOR IP): Performed by: PHYSICIAN ASSISTANT

## 2023-11-25 PROCEDURE — 96361 HYDRATE IV INFUSION ADD-ON: CPT

## 2023-11-25 PROCEDURE — 87804 INFLUENZA ASSAY W/OPTIC: CPT

## 2023-11-25 PROCEDURE — 99284 EMERGENCY DEPT VISIT MOD MDM: CPT

## 2023-11-25 PROCEDURE — 71046 X-RAY EXAM CHEST 2 VIEWS: CPT

## 2023-11-25 PROCEDURE — 96360 HYDRATION IV INFUSION INIT: CPT

## 2023-11-25 RX ORDER — 0.9 % SODIUM CHLORIDE 0.9 %
1000 INTRAVENOUS SOLUTION INTRAVENOUS ONCE
Status: COMPLETED | OUTPATIENT
Start: 2023-11-25 | End: 2023-11-25

## 2023-11-25 RX ORDER — ONDANSETRON 4 MG/1
4 TABLET, ORALLY DISINTEGRATING ORAL
Status: COMPLETED | OUTPATIENT
Start: 2023-11-25 | End: 2023-11-25

## 2023-11-25 RX ADMIN — ONDANSETRON 4 MG: 4 TABLET, ORALLY DISINTEGRATING ORAL at 13:13

## 2023-11-25 RX ADMIN — SODIUM CHLORIDE 1000 ML: 9 INJECTION, SOLUTION INTRAVENOUS at 13:13

## 2023-11-25 ASSESSMENT — ENCOUNTER SYMPTOMS
VOMITING: 1
DIARRHEA: 0
NAUSEA: 1
RHINORRHEA: 1
SHORTNESS OF BREATH: 0
COUGH: 1
ABDOMINAL PAIN: 0
SORE THROAT: 0

## 2023-11-25 ASSESSMENT — LIFESTYLE VARIABLES: HOW OFTEN DO YOU HAVE A DRINK CONTAINING ALCOHOL: MONTHLY OR LESS

## 2023-11-25 ASSESSMENT — PAIN - FUNCTIONAL ASSESSMENT: PAIN_FUNCTIONAL_ASSESSMENT: NONE - DENIES PAIN

## 2023-11-25 NOTE — ED TRIAGE NOTES
Pt reports n/v intermittently x1 week. Pt reports productive cough with dark sputum x1 week. Pt reports his friend had the flu. Pt denies pain but reports feeling fatigued.

## 2023-11-25 NOTE — ED PROVIDER NOTES
[facility] EMERGENCY CTR  EMERGENCY DEPARTMENT ENCOUNTER      Pt Name: Opal Gregory  MRN: [id number]  [address] [facility] 1965  Date of evaluation: 11/25/2023  Provider: Marbin Clement       Chief Complaint   Patient presents with    Nausea    Emesis    Cough         HISTORY OF PRESENT ILLNESS   (Location/Symptom, Timing/Onset, Context/Setting, Quality, Duration, Modifying Factors, Severity)  Note limiting factors. 64 y/o male presenting with complaint of nausea, vomiting and cough. The patient states that he has had cough, nausea, fatigue and malaise for the past 6 days, with associated vomiting that occurred during the first 2 days of illness and again yesterday. No vomiting today. He notes that he has been taking zofran at home for his nausea. Sick contacts include a friend who was diagnosed with the flu, as well as his wife who has similar symptoms. He denies fever, nasal congestion, sore throat, shortness of breath, chest pain, abdominal pain, diarrhea, generalized body aches or syncope. The history is provided by the patient. Review of External Medical Records:     Nursing Notes were reviewed. REVIEW OF SYSTEMS    (2-9 systems for level 4, 10 or more for level 5)     Review of Systems   Constitutional:  Positive for fatigue. Negative for fever. HENT:  Positive for rhinorrhea (baseline). Negative for congestion and sore throat. Respiratory:  Positive for cough. Negative for shortness of breath. Cardiovascular:  Negative for chest pain. Gastrointestinal:  Positive for nausea and vomiting. Negative for abdominal pain and diarrhea. Musculoskeletal:  Negative for myalgias. Neurological:  Negative for syncope. Except as noted above the remainder of the review of systems was reviewed and negative.        PAST MEDICAL HISTORY     Past Medical History:   Diagnosis Date    Cervical stenosis of spinal canal 5/13/2016    Diabetes ([address])     Hypertension

## 2023-11-25 NOTE — ED NOTES
Pt d/c'd home. IV d/c'd cath intact. Pt given d/c instructions and verbalized understanding. Declined w/c and left ambulatory in care of self in stable condition.       Sarah Beth Mark RN  11/25/23 [id number]

## 2023-11-30 DIAGNOSIS — I10 PRIMARY HYPERTENSION: ICD-10-CM

## 2023-11-30 DIAGNOSIS — F41.9 ANXIETY: ICD-10-CM

## 2023-11-30 DIAGNOSIS — E78.5 HYPERLIPIDEMIA, UNSPECIFIED HYPERLIPIDEMIA TYPE: ICD-10-CM

## 2023-11-30 DIAGNOSIS — E55.9 VITAMIN D DEFICIENCY: ICD-10-CM

## 2023-11-30 DIAGNOSIS — E11.65 UNCONTROLLED TYPE 2 DIABETES MELLITUS WITH HYPERGLYCEMIA (HCC): ICD-10-CM

## 2023-11-30 LAB
CREAT UR-MCNC: 116 MG/DL
MICROALBUMIN UR-MCNC: 2.52 MG/DL
MICROALBUMIN/CREAT UR-RTO: 22 MG/G (ref 0–30)
SPECIMEN HOLD: NORMAL

## 2023-12-01 LAB
25(OH)D3 SERPL-MCNC: 50.7 NG/ML (ref 30–100)
ALBUMIN SERPL-MCNC: 3.6 G/DL (ref 3.5–5)
ALBUMIN/GLOB SERPL: 0.9 (ref 1.1–2.2)
ALP SERPL-CCNC: 108 U/L (ref 45–117)
ALT SERPL-CCNC: 30 U/L (ref 12–78)
ANION GAP SERPL CALC-SCNC: 6 MMOL/L (ref 5–15)
AST SERPL-CCNC: 21 U/L (ref 15–37)
BILIRUB SERPL-MCNC: 0.5 MG/DL (ref 0.2–1)
BUN SERPL-MCNC: 10 MG/DL (ref 6–20)
BUN/CREAT SERPL: 7 (ref 12–20)
CALCIUM SERPL-MCNC: 8.7 MG/DL (ref 8.5–10.1)
CHLORIDE SERPL-SCNC: 104 MMOL/L (ref 97–108)
CHOLEST SERPL-MCNC: 118 MG/DL
CO2 SERPL-SCNC: 29 MMOL/L (ref 21–32)
CREAT SERPL-MCNC: 1.36 MG/DL (ref 0.7–1.3)
EST. AVERAGE GLUCOSE BLD GHB EST-MCNC: 166 MG/DL
GLOBULIN SER CALC-MCNC: 3.8 G/DL (ref 2–4)
GLUCOSE SERPL-MCNC: 192 MG/DL (ref 65–100)
HBA1C MFR BLD: 7.4 % (ref 4–5.6)
HDLC SERPL-MCNC: 22 MG/DL
HDLC SERPL: 5.4 (ref 0–5)
LDLC SERPL CALC-MCNC: 45.4 MG/DL (ref 0–100)
POTASSIUM SERPL-SCNC: 4.6 MMOL/L (ref 3.5–5.1)
PROT SERPL-MCNC: 7.4 G/DL (ref 6.4–8.2)
SODIUM SERPL-SCNC: 139 MMOL/L (ref 136–145)
TRIGL SERPL-MCNC: 253 MG/DL
VLDLC SERPL CALC-MCNC: 50.6 MG/DL

## 2023-12-06 LAB — DRUGS UR: NORMAL

## 2023-12-11 ENCOUNTER — OFFICE VISIT (OUTPATIENT)
Age: 58
End: 2023-12-11
Payer: COMMERCIAL

## 2023-12-11 VITALS
WEIGHT: 200 LBS | HEART RATE: 60 BPM | TEMPERATURE: 97.2 F | RESPIRATION RATE: 16 BRPM | SYSTOLIC BLOOD PRESSURE: 120 MMHG | DIASTOLIC BLOOD PRESSURE: 76 MMHG | HEIGHT: 74 IN | BODY MASS INDEX: 25.67 KG/M2 | OXYGEN SATURATION: 97 %

## 2023-12-11 DIAGNOSIS — E11.42 DIABETIC POLYNEUROPATHY ASSOCIATED WITH TYPE 2 DIABETES MELLITUS (HCC): ICD-10-CM

## 2023-12-11 DIAGNOSIS — G25.0 BENIGN ESSENTIAL TREMOR: Primary | ICD-10-CM

## 2023-12-11 PROCEDURE — 99214 OFFICE O/P EST MOD 30 MIN: CPT | Performed by: PSYCHIATRY & NEUROLOGY

## 2023-12-11 PROCEDURE — 3051F HG A1C>EQUAL 7.0%<8.0%: CPT | Performed by: PSYCHIATRY & NEUROLOGY

## 2023-12-11 PROCEDURE — 3074F SYST BP LT 130 MM HG: CPT | Performed by: PSYCHIATRY & NEUROLOGY

## 2023-12-11 PROCEDURE — 3078F DIAST BP <80 MM HG: CPT | Performed by: PSYCHIATRY & NEUROLOGY

## 2023-12-11 RX ORDER — PRIMIDONE 50 MG/1
TABLET ORAL
Qty: 90 TABLET | Refills: 3 | Status: SHIPPED | OUTPATIENT
Start: 2023-12-11

## 2023-12-11 NOTE — PROGRESS NOTES
in predominantly sensory diabetic polyneuropathy. EMG/NCS of bilateral lower extremity 10/4/2023 and revealed significant distal lower extremity length dependent mix (axonal/demyelinating) sensorimotor polyneuropathy. Patient was advised compliance with dietary modifications and medications for diabetes. He understood. Recommend routine structured exercises and fall precautions. Patient also has history of monoclonal gammopathy which can contribute to the history of the peripheral neuropathy. Advised follow up with hematology. Patient not currently having significant neuropathic discomforts. Continue to use of voltaren gel. All questions and concerns were answered. This note was created using voice recognition software. Despite editing, there may be syntax errors.

## 2023-12-11 NOTE — PATIENT INSTRUCTIONS
Patient Education        Essential Tremor: Care Instructions  Overview     Essential tremor is a medical term for shaking that you can't control. Your hand or fingers may shake when you lift a cup or point at something. Or your voice may shake when you speak. It is not related to a stroke or Parkinson's disease. Some things can affect how much you shake. For example, anxiety may make tremors worse. Some medicines also can increase tremors. These include antidepressants and too much thyroid replacement. Talk to your doctor if you think one of your medicines makes your tremors worse. If your tremors bother you, there are some things you can do to reduce them or make them less noticeable. This includes taking medicine. Follow-up care is a key part of your treatment and safety. Be sure to make and go to all appointments, and call your doctor if you are having problems. It's also a good idea to know your test results and keep a list of the medicines you take. How can you care for yourself at home? Take your medicines exactly as prescribed. Call your doctor if you think you are having a problem with your medicine. Some medicines that help control tremors have to be taken every day, even if you are not having tremors. You will get more details on the specific medicines your doctor prescribes. Get plenty of rest.  Eat a balanced, healthy diet. Try to reduce stress. Regular exercise and massages may help. Avoid drinks or foods with caffeine if they make your tremors worse. These include tea, cola, coffee, and chocolate. Wear a heavy bracelet or watch. This adds a little weight to your hand. The extra weight may reduce tremors. Drink from cups or glasses that are only half full. You may also want to try drinking with a straw. When should you call for help? Watch closely for changes in your health, and be sure to contact your doctor if:    You notice your tremors are getting worse.      You can't do your everyday

## 2024-01-04 ENCOUNTER — TELEPHONE (OUTPATIENT)
Dept: PRIMARY CARE CLINIC | Facility: CLINIC | Age: 59
End: 2024-01-04

## 2024-01-04 NOTE — TELEPHONE ENCOUNTER
Called [facility] to check status of patients diabetic supplies paperwork that was faxed over. They are re faxing these forms to us. Gave back fax number. Notified patient of this.     Will keep an eye out for these forms and will have Dr. Do resign these and get them faxed back over to [facility].

## 2024-03-12 ENCOUNTER — OFFICE VISIT (OUTPATIENT)
Age: 59
End: 2024-03-12
Payer: COMMERCIAL

## 2024-03-12 VITALS
SYSTOLIC BLOOD PRESSURE: 120 MMHG | BODY MASS INDEX: 24.56 KG/M2 | DIASTOLIC BLOOD PRESSURE: 78 MMHG | TEMPERATURE: 98 F | WEIGHT: 191.4 LBS | HEART RATE: 67 BPM | OXYGEN SATURATION: 97 % | RESPIRATION RATE: 16 BRPM | HEIGHT: 74 IN

## 2024-03-12 DIAGNOSIS — G25.0 BENIGN ESSENTIAL TREMOR: ICD-10-CM

## 2024-03-12 DIAGNOSIS — F33.0 MILD EPISODE OF RECURRENT MAJOR DEPRESSIVE DISORDER (HCC): ICD-10-CM

## 2024-03-12 DIAGNOSIS — I10 ESSENTIAL (PRIMARY) HYPERTENSION: ICD-10-CM

## 2024-03-12 DIAGNOSIS — E11.29 TYPE 2 DIABETES MELLITUS WITH OTHER DIABETIC KIDNEY COMPLICATION (HCC): ICD-10-CM

## 2024-03-12 DIAGNOSIS — K21.9 GASTROESOPHAGEAL REFLUX DISEASE WITHOUT ESOPHAGITIS: ICD-10-CM

## 2024-03-12 DIAGNOSIS — E11.29 TYPE 2 DIABETES MELLITUS WITH OTHER DIABETIC KIDNEY COMPLICATION (HCC): Primary | ICD-10-CM

## 2024-03-12 LAB
BASOPHILS # BLD AUTO: 0.1 X10E3/UL (ref 0–0.2)
BASOPHILS NFR BLD AUTO: 1 %
EOSINOPHIL # BLD AUTO: 0.4 X10E3/UL (ref 0–0.4)
EOSINOPHIL NFR BLD AUTO: 4 %
ERYTHROCYTE [DISTWIDTH] IN BLOOD BY AUTOMATED COUNT: 12.6 % (ref 11.6–15.4)
HBA1C MFR BLD: 7.5 % (ref 4.8–5.6)
HCT VFR BLD AUTO: 47.8 % (ref 37.5–51)
HGB BLD-MCNC: 15.6 G/DL (ref 13–17.7)
IMM GRANULOCYTES # BLD AUTO: 0.1 X10E3/UL (ref 0–0.1)
IMM GRANULOCYTES NFR BLD AUTO: 1 %
LYMPHOCYTES # BLD AUTO: 2.2 X10E3/UL (ref 0.7–3.1)
LYMPHOCYTES NFR BLD AUTO: 22 %
MCH RBC QN AUTO: 27.2 PG (ref 26.6–33)
MCHC RBC AUTO-ENTMCNC: 32.6 G/DL (ref 31.5–35.7)
MCV RBC AUTO: 83 FL (ref 79–97)
MONOCYTES # BLD AUTO: 1.1 X10E3/UL (ref 0.1–0.9)
MONOCYTES NFR BLD AUTO: 11 %
NEUTROPHILS # BLD AUTO: 6.1 X10E3/UL (ref 1.4–7)
NEUTROPHILS NFR BLD AUTO: 61 %
PLATELET # BLD AUTO: 306 X10E3/UL (ref 150–450)
RBC # BLD AUTO: 5.74 X10E6/UL (ref 4.14–5.8)
WBC # BLD AUTO: 9.9 X10E3/UL (ref 3.4–10.8)

## 2024-03-12 PROCEDURE — 3078F DIAST BP <80 MM HG: CPT | Performed by: INTERNAL MEDICINE

## 2024-03-12 PROCEDURE — 99214 OFFICE O/P EST MOD 30 MIN: CPT | Performed by: INTERNAL MEDICINE

## 2024-03-12 PROCEDURE — 3074F SYST BP LT 130 MM HG: CPT | Performed by: INTERNAL MEDICINE

## 2024-03-12 RX ORDER — ALPRAZOLAM 0.25 MG/1
TABLET ORAL
COMMUNITY
Start: 2024-01-05

## 2024-03-12 RX ORDER — OMEPRAZOLE 20 MG/1
20 CAPSULE, DELAYED RELEASE ORAL DAILY
Qty: 90 CAPSULE | Refills: 3 | Status: SHIPPED | OUTPATIENT
Start: 2024-03-12

## 2024-03-12 RX ORDER — INSULIN LISPRO 100 [IU]/ML
INJECTION, SOLUTION INTRAVENOUS; SUBCUTANEOUS
Qty: 21.6 ML | Refills: 0 | Status: SHIPPED | OUTPATIENT
Start: 2024-03-12

## 2024-03-12 ASSESSMENT — ANXIETY QUESTIONNAIRES
5. BEING SO RESTLESS THAT IT IS HARD TO SIT STILL: 0
6. BECOMING EASILY ANNOYED OR IRRITABLE: 0
1. FEELING NERVOUS, ANXIOUS, OR ON EDGE: 0
3. WORRYING TOO MUCH ABOUT DIFFERENT THINGS: 0
GAD7 TOTAL SCORE: 0
4. TROUBLE RELAXING: 0
7. FEELING AFRAID AS IF SOMETHING AWFUL MIGHT HAPPEN: 0
2. NOT BEING ABLE TO STOP OR CONTROL WORRYING: 0
IF YOU CHECKED OFF ANY PROBLEMS ON THIS QUESTIONNAIRE, HOW DIFFICULT HAVE THESE PROBLEMS MADE IT FOR YOU TO DO YOUR WORK, TAKE CARE OF THINGS AT HOME, OR GET ALONG WITH OTHER PEOPLE: NOT DIFFICULT AT ALL

## 2024-03-12 ASSESSMENT — PATIENT HEALTH QUESTIONNAIRE - PHQ9
1. LITTLE INTEREST OR PLEASURE IN DOING THINGS: 0
SUM OF ALL RESPONSES TO PHQ9 QUESTIONS 1 & 2: 1
9. THOUGHTS THAT YOU WOULD BE BETTER OFF DEAD, OR OF HURTING YOURSELF: 0
5. POOR APPETITE OR OVEREATING: 0
7. TROUBLE CONCENTRATING ON THINGS, SUCH AS READING THE NEWSPAPER OR WATCHING TELEVISION: 0
3. TROUBLE FALLING OR STAYING ASLEEP: 0
DEPRESSION UNABLE TO ASSESS: PT REFUSES
SUM OF ALL RESPONSES TO PHQ QUESTIONS 1-9: 1
8. MOVING OR SPEAKING SO SLOWLY THAT OTHER PEOPLE COULD HAVE NOTICED. OR THE OPPOSITE, BEING SO FIGETY OR RESTLESS THAT YOU HAVE BEEN MOVING AROUND A LOT MORE THAN USUAL: 0
2. FEELING DOWN, DEPRESSED OR HOPELESS: 1
SUM OF ALL RESPONSES TO PHQ QUESTIONS 1-9: 1
SUM OF ALL RESPONSES TO PHQ QUESTIONS 1-9: 1
6. FEELING BAD ABOUT YOURSELF - OR THAT YOU ARE A FAILURE OR HAVE LET YOURSELF OR YOUR FAMILY DOWN: 0
10. IF YOU CHECKED OFF ANY PROBLEMS, HOW DIFFICULT HAVE THESE PROBLEMS MADE IT FOR YOU TO DO YOUR WORK, TAKE CARE OF THINGS AT HOME, OR GET ALONG WITH OTHER PEOPLE: 0
SUM OF ALL RESPONSES TO PHQ QUESTIONS 1-9: 1
4. FEELING TIRED OR HAVING LITTLE ENERGY: 0

## 2024-03-12 ASSESSMENT — ENCOUNTER SYMPTOMS
GASTROINTESTINAL NEGATIVE: 1
RESPIRATORY NEGATIVE: 1

## 2024-03-12 NOTE — PROGRESS NOTES
Subjective    Nathaniel Miller is a 58 y.o. male who presents today for the following: patient was seen to establish care. PMH of HLD, DM, HTN, tremors, GERD and neuropathy.      He has scheduled his colonoscopy for the next couple of months. Psa is done and stable     He is a diabetic and has been without his Humalog for the last 6 months. Has been using CGM to monitor. BS range in the 150s-to mid 200s. Reports that he has neuropathy to the feet. It is now in both     Is followed by neurology for is tremors. Is currently on Inderal and primidone.     Has been on potassium citrate after history of kidney stones.     Has an appointment with psych for depression coming up.     Does not get vaccines       Chief Complaint   Patient presents with    Establish Care    New Patient    Discuss Medications     Wellbutrin           PMH/PSH/Allergies/Social History/medication list and most recent studies reviewed with patient.     reports that he has been smoking cigars. He has never used smokeless tobacco.    reports current alcohol use.     Vitals:    03/12/24 1303   BP: 120/78   Pulse: 67   Resp: 16   Temp: 98 °F (36.7 °C)   SpO2: 97%     Body mass index is 24.57 kg/m².      3/12/2024     1:03 PM 12/11/2023     2:13 PM 11/25/2023    12:18 PM 9/20/2023    12:50 PM 9/11/2023     9:09 AM 5/10/2023    10:45 AM 5/9/2023     9:12 AM   Weight Metrics   Weight 191 lb 6.4 oz 200 lb 186 lb 15.2 oz 197 lb 200 lb 197 lb 196 lb 8 oz   BMI (Calculated) 24.6 kg/m2 25.7 kg/m2 24.1 kg/m2 25.3 kg/m2 25.7 kg/m2 25.3 kg/m2 25.3 kg/m2       Past Medical History:   Diagnosis Date    Cervical stenosis of spinal canal 5/13/2016    Diabetes (HCC)     Hypertension        No Known Allergies     Current Outpatient Medications on File Prior to Visit   Medication Sig Dispense Refill    ALPRAZolam (XANAX) 0.25 MG tablet TAKE 1 TABLET TWICE DAILY AS NEEDED FOR ANXIETY      primidone (MYSOLINE) 50 MG tablet Take 1 tab at bedtime x 2 wks; then 1 tab BID x 2

## 2024-03-12 NOTE — PROGRESS NOTES
\"Have you been to the ER, urgent care clinic since your last visit?  Hospitalized since your last visit?\"    NO    “Have you seen or consulted any other health care providers outside of [facility] since your last visit?”    NO    “Have you had a colorectal cancer screening such as a colonoscopy/FIT/Cologuard?    NO

## 2024-03-13 ENCOUNTER — TELEPHONE (OUTPATIENT)
Age: 59
End: 2024-03-13

## 2024-03-13 LAB
ALBUMIN SERPL-MCNC: 4.3 G/DL (ref 3.8–4.9)
ALBUMIN/GLOB SERPL: 1.4 {RATIO} (ref 1.2–2.2)
ALP SERPL-CCNC: 121 IU/L (ref 44–121)
ALT SERPL-CCNC: 26 IU/L (ref 0–44)
AST SERPL-CCNC: 25 IU/L (ref 0–40)
BILIRUB SERPL-MCNC: 0.4 MG/DL (ref 0–1.2)
BUN SERPL-MCNC: 10 MG/DL (ref 6–24)
BUN/CREAT SERPL: 7 (ref 9–20)
CALCIUM SERPL-MCNC: 9.8 MG/DL (ref 8.7–10.2)
CHLORIDE SERPL-SCNC: 98 MMOL/L (ref 96–106)
CO2 SERPL-SCNC: 21 MMOL/L (ref 20–29)
CREAT SERPL-MCNC: 1.34 MG/DL (ref 0.76–1.27)
EGFRCR SERPLBLD CKD-EPI 2021: 62 ML/MIN/1.73
GLOBULIN SER CALC-MCNC: 3 G/DL (ref 1.5–4.5)
GLUCOSE SERPL-MCNC: 225 MG/DL (ref 70–99)
POTASSIUM SERPL-SCNC: 5.2 MMOL/L (ref 3.5–5.2)
PROT SERPL-MCNC: 7.3 G/DL (ref 6–8.5)
SODIUM SERPL-SCNC: 137 MMOL/L (ref 134–144)
TSH SERPL DL<=0.005 MIU/L-ACNC: 1.18 UIU/ML (ref 0.45–4.5)

## 2024-03-13 NOTE — TELEPHONE ENCOUNTER
Called for lab results 09:47 am  Could not leave a voice message cause it was full.    ----- Message from GAUTAM Feliciano NP sent at 3/13/2024  9:26 AM EDT -----  Cr continues to be elevated. This is his baseline     A1c remains elevated but stable. Begin Humalog but please monitor blood sugars as discussed

## 2024-03-14 LAB
ALBUMIN/CREAT UR: 24 MG/G CREAT (ref 0–29)
CREAT UR-MCNC: 63.1 MG/DL
MICROALBUMIN UR-MCNC: 15.3 UG/ML

## 2024-03-26 ENCOUNTER — OFFICE VISIT (OUTPATIENT)
Age: 59
End: 2024-03-26
Payer: COMMERCIAL

## 2024-03-26 VITALS
BODY MASS INDEX: 24.38 KG/M2 | RESPIRATION RATE: 18 BRPM | HEART RATE: 70 BPM | OXYGEN SATURATION: 97 % | TEMPERATURE: 96.8 F | WEIGHT: 190 LBS | HEIGHT: 74 IN | DIASTOLIC BLOOD PRESSURE: 72 MMHG | SYSTOLIC BLOOD PRESSURE: 124 MMHG

## 2024-03-26 DIAGNOSIS — G25.0 BENIGN ESSENTIAL TREMOR: Primary | ICD-10-CM

## 2024-03-26 DIAGNOSIS — E11.42 DIABETIC POLYNEUROPATHY ASSOCIATED WITH TYPE 2 DIABETES MELLITUS (HCC): ICD-10-CM

## 2024-03-26 PROCEDURE — 99214 OFFICE O/P EST MOD 30 MIN: CPT | Performed by: PSYCHIATRY & NEUROLOGY

## 2024-03-26 PROCEDURE — 3074F SYST BP LT 130 MM HG: CPT | Performed by: PSYCHIATRY & NEUROLOGY

## 2024-03-26 PROCEDURE — 3078F DIAST BP <80 MM HG: CPT | Performed by: PSYCHIATRY & NEUROLOGY

## 2024-03-26 PROCEDURE — 3051F HG A1C>EQUAL 7.0%<8.0%: CPT | Performed by: PSYCHIATRY & NEUROLOGY

## 2024-03-26 RX ORDER — PROPRANOLOL HYDROCHLORIDE 120 MG/1
120 CAPSULE, EXTENDED RELEASE ORAL DAILY
Qty: 30 CAPSULE | Refills: 0 | Status: SHIPPED | OUTPATIENT
Start: 2024-03-26

## 2024-03-26 RX ORDER — PRIMIDONE 50 MG/1
100 TABLET ORAL 2 TIMES DAILY
Qty: 120 TABLET | Refills: 5 | Status: SHIPPED | OUTPATIENT
Start: 2024-03-26

## 2024-03-26 NOTE — PROGRESS NOTES
NEUROLOGY CLINIC NOTE    Patient ID:  Nathaniel Miller  [id number]  58 y.o.  1965    Date of Visit:  March 26, 2024    Reason for Visit:  tremor, neuropathy    Chief Complaint   Patient presents with    Tremors     3 month follow up- patient reports tremors in hands have gotten better but gets when excessive writing or typing.    Peripheral Neuropathy     3 month follow up- patient reports neuropathy in both feet has gotten worse.    Dizziness     Patient states he gets dizzy at times when he is standing and closes his eyes and noticed this happening in the last 2-3 months.         History of Present Illness:     Patient Active Problem List    Diagnosis Date Noted    Dizziness 05/10/2023    Pain in both feet 11/22/2022    Abnormal liver CT 06/24/2022    Hyponatremia 01/13/2022    B12 deficiency 11/02/2021    Skin cyst 11/02/2021    Monoclonal gammopathy 11/02/2021    Memory loss 10/03/2021    Mild episode of recurrent major depressive disorder (HCC) 10/03/2021    Fatigue 10/03/2021    Neuropathy 10/03/2021    Essential hypertension 03/09/2021    Hyperlipidemia, unspecified hyperlipidemia type 03/09/2021    Essential tremor 03/09/2021    Attention deficit disorder, unspecified hyperactivity presence 03/09/2021    Anxiety 03/09/2021    Type 2 diabetes mellitus with microalbuminuria, with long-term current use of insulin (HCC) 03/09/2021    Gastroesophageal reflux disease without esophagitis 03/09/2021    Acute hyperkalemia 02/23/2021    Recurrent nephrolithiasis 02/23/2021    Sarcoidosis 02/23/2021    Cervical stenosis of spinal canal 05/13/2016     Past Medical History:   Diagnosis Date    Cervical stenosis of spinal canal 5/13/2016    Diabetes (HCC)     Hypertension       Past Surgical History:   Procedure Laterality Date    CERVICAL FUSION      ORTHOPEDIC SURGERY      multi lumbar lami/fusions    [place] UNLISTED PROCEDURE ABDOMEN PERITONEUM & OMENTUM  2013    liver biopsy    TONSILLECTOMY      UROLOGICAL SURGERY

## 2024-04-27 DIAGNOSIS — E11.29 TYPE 2 DIABETES MELLITUS WITH OTHER DIABETIC KIDNEY COMPLICATION (HCC): ICD-10-CM

## 2024-04-29 RX ORDER — INSULIN LISPRO 100 [IU]/ML
INJECTION, SOLUTION INTRAVENOUS; SUBCUTANEOUS
Qty: 3 ML | Refills: 2 | Status: SHIPPED | OUTPATIENT
Start: 2024-04-29

## 2024-05-02 DIAGNOSIS — E11.29 TYPE 2 DIABETES MELLITUS WITH OTHER DIABETIC KIDNEY COMPLICATION (HCC): Primary | ICD-10-CM

## 2024-05-02 RX ORDER — ONDANSETRON 4 MG/1
4 TABLET, ORALLY DISINTEGRATING ORAL EVERY 8 HOURS PRN
Qty: 90 TABLET | Refills: 3 | Status: SHIPPED | OUTPATIENT
Start: 2024-05-02

## 2024-05-02 RX ORDER — INSULIN GLARGINE 100 [IU]/ML
20 INJECTION, SOLUTION SUBCUTANEOUS EVERY EVENING
Qty: 3 ADJUSTABLE DOSE PRE-FILLED PEN SYRINGE | Refills: 1 | Status: SHIPPED | OUTPATIENT
Start: 2024-05-02

## 2024-05-02 RX ORDER — EMPAGLIFLOZIN 10 MG/1
TABLET, FILM COATED ORAL
Qty: 90 TABLET | Refills: 1 | Status: SHIPPED | OUTPATIENT
Start: 2024-05-02

## 2024-06-05 NOTE — PROGRESS NOTES
Chief Complaint   Patient presents with    New Patient       There were no vitals taken for this visit. 1. Have you been to the ER, urgent care clinic since your last visit? Hospitalized since your last visit? No    2. Have you seen or consulted any other health care providers outside of the [address] [place] [address] since your last visit? Include any pap smears or colon screening. Yes [facility] Dermatologist, and Urologist       3. For patients aged 43-73: Has the patient had a colonoscopy / FIT/ Cologuard? no      If the patient is female:    4. For patients aged 43-66: Has the patient had a mammogram within the past 2 years? NA      5. For patients aged 21-65: Has the patient had a pap smear?  NA
respiratory distress. Breath sounds: Normal breath sounds. No wheezing, rhonchi or rales. Neurological:      General: No focal deficit present. Mental Status: He is alert. Psychiatric:         Mood and Affect: Mood normal.         Behavior: Behavior normal.       No results found for this or any previous visit (from the past 12 hour(s)). Assessment / Tyler Fall was seen today for new patient. Diagnoses and all orders for this visit:    Anxiety - Discussed I appreciate his honesty about the marijuana use. Can give a 30 day fill but needs to set up appt with Psych and stop using marijuana with the Xanax. Discussed if he needed another fill we could not fill if marijuana continues to be in his urine. He voiced understanding.  -     External Referral To Psychiatry  -     Compliance Drug Analysis, Urine; Future    Uncontrolled type 2 diabetes mellitus with hyperglycemia (HCC)  -     Hemoglobin A1C; Future  -     Comprehensive Metabolic Panel; Future  -     Microalbumin / Creatinine Urine Ratio; Future    Vitamin D deficiency  -     Vitamin D 25 Hydroxy; Future    Screening for colon cancer  -     AFL - Curtis Wheat MD, [facility] [place] ([facility])    Hyperlipidemia, unspecified hyperlipidemia type  -     Lipid Panel; Future  -     Comprehensive Metabolic Panel; Future    Primary hypertension  -     Comprehensive Metabolic Panel; Future       No follow-up provider specified. I have discussed the diagnosis with the patient and the intended plan as seen in the above orders. The patient has received an after-visit summary and questions were answered concerning future plans. I have discussed medication side effects and warnings with the patient as well.     Jean Benjamin, DO
Stable

## 2024-07-22 ENCOUNTER — OFFICE VISIT (OUTPATIENT)
Age: 59
End: 2024-07-22

## 2024-07-22 VITALS
OXYGEN SATURATION: 97 % | HEART RATE: 76 BPM | TEMPERATURE: 98.3 F | WEIGHT: 193.4 LBS | DIASTOLIC BLOOD PRESSURE: 75 MMHG | HEIGHT: 74 IN | BODY MASS INDEX: 24.82 KG/M2 | SYSTOLIC BLOOD PRESSURE: 120 MMHG

## 2024-07-22 DIAGNOSIS — B34.9 VIRAL INFECTION: Primary | ICD-10-CM

## 2024-07-22 LAB
Lab: NORMAL
PERFORMING INSTRUMENT: NORMAL
QC PASS/FAIL: NORMAL
SARS-COV-2, POC: NORMAL

## 2024-07-22 ASSESSMENT — ENCOUNTER SYMPTOMS
SORE THROAT: 1
RHINORRHEA: 0
TROUBLE SWALLOWING: 0
RESPIRATORY NEGATIVE: 1
EYES NEGATIVE: 1
SINUS PRESSURE: 0
GASTROINTESTINAL NEGATIVE: 1
SINUS PAIN: 0

## 2024-07-22 NOTE — PATIENT INSTRUCTIONS
Thank you for visiting [facility] today    Nasal Congestion:  Flonase or Nasonex (over the counter) nasal spray, once a day  Saline irrigation kits help wash out sinuses 1-2 times a day  Normal saline nasal spray  Afrin nasal spray no longer than three days  Cough:  Throat lozenges, hot tea, and honey may help  Vicks VapoRub at night to help with cough and relieve muscles aches and pain  If not prescribed a cough medication, Robitussin DM is an option.  It is an over the counter cough medication containing dextromethorphan to help suppress cough at night   *Please only take when absolutely needed, as this is a controlled substance that can cause addiction   *Please only take cough syrup at nighttime as it causes drowsiness   *Do not drive or operate any machinery while taking this medication  If you have high blood pressure, take Coricidin HBP (or the generic form) instead.  Follow instructions on the box.  Congestion:  For thick mucus, take Mucinex (with Guafenesin only) to help thin the mucus.  Follow instructions on the box.  You will need to drink plenty of water with this medication.  Sore Throat:  Lozenges, as needed. Cepacol lozenges will help numb the throat  Chloraseptic spray also helps to numb throat pain  Salt water gargles to soothe throat pain  Sinus pain/pressure:  Warm, wet towel on face to help with facial sinus pain/pressure  Headache/Pain Fever/Body Aches:  If you can take NSAIDs, take Ibuprofen 400-800mg every 8 hours as needed  If you cannot take NSAIDs, take Tylenol 325-500mg every 6 hours as needed  Miscellanous:  Zyrtec/Xyzal/Allegra/Claritin during the day or Benadryl at night may help with allergies.  You  may also use the decongestant version of these medications.   Simple foods like chicken noodle soup, smoothies, hot tea with lemon and honey may also help  Avoid smoking  Minimize exposure to irritants    Please follow up with your primary care provider within 2-5 days if

## 2024-07-22 NOTE — PROGRESS NOTES
Drug use: Yes     Types: Marijuana (Weed)     Comment: occasional       Vitals:    07/22/24 1331   BP: 120/75   Pulse: 76   Temp: 98.3 °F (36.8 °C)   SpO2: 97%       Review of Systems   Constitutional: Negative.    HENT:  Positive for congestion and sore throat. Negative for ear discharge, ear pain, postnasal drip, rhinorrhea, sinus pressure, sinus pain and trouble swallowing.         Throat congestion at night   Eyes: Negative.    Respiratory: Negative.     Cardiovascular: Negative.    Gastrointestinal: Negative.    Genitourinary: Negative.    Musculoskeletal:  Positive for myalgias.   Allergic/Immunologic: Positive for environmental allergies.   Neurological:  Positive for headaches.       Objective     Physical Exam  Constitutional:       General: He is not in acute distress.     Appearance: Normal appearance. He is normal weight. He is not ill-appearing, toxic-appearing or diaphoretic.   HENT:      Head: Normocephalic and atraumatic.      Ears:      Comments: Hearing aid in left and right ear.      Nose: Nose normal.      Mouth/Throat:      Mouth: Mucous membranes are moist.      Pharynx: Oropharynx is clear.   Eyes:      Extraocular Movements: Extraocular movements intact.   Cardiovascular:      Rate and Rhythm: Normal rate and regular rhythm.      Heart sounds: Normal heart sounds.   Pulmonary:      Effort: Pulmonary effort is normal.      Breath sounds: Normal breath sounds.   Abdominal:      General: Abdomen is flat.      Palpations: Abdomen is soft.   Musculoskeletal:         General: Normal range of motion.      Cervical back: Normal range of motion and neck supple. No tenderness.   Skin:     General: Skin is warm and dry.   Neurological:      General: No focal deficit present.      Mental Status: He is alert and oriented to person, place, and time. Mental status is at baseline.   Psychiatric:         Mood and Affect: Mood normal.         Behavior: Behavior normal.         Thought Content: Thought

## 2024-07-26 ENCOUNTER — OFFICE VISIT (OUTPATIENT)
Age: 59
End: 2024-07-26
Payer: COMMERCIAL

## 2024-07-26 VITALS
SYSTOLIC BLOOD PRESSURE: 108 MMHG | TEMPERATURE: 98 F | HEIGHT: 74 IN | DIASTOLIC BLOOD PRESSURE: 62 MMHG | RESPIRATION RATE: 19 BRPM | WEIGHT: 189.1 LBS | BODY MASS INDEX: 24.27 KG/M2 | HEART RATE: 69 BPM | OXYGEN SATURATION: 98 %

## 2024-07-26 DIAGNOSIS — R53.82 CHRONIC FATIGUE: ICD-10-CM

## 2024-07-26 DIAGNOSIS — M54.16 CHRONIC RADICULAR PAIN OF LOWER BACK: ICD-10-CM

## 2024-07-26 DIAGNOSIS — G62.9 NEUROPATHY: ICD-10-CM

## 2024-07-26 DIAGNOSIS — I10 ESSENTIAL (PRIMARY) HYPERTENSION: ICD-10-CM

## 2024-07-26 DIAGNOSIS — G89.29 CHRONIC RADICULAR PAIN OF LOWER BACK: ICD-10-CM

## 2024-07-26 DIAGNOSIS — G25.0 BENIGN ESSENTIAL TREMOR: ICD-10-CM

## 2024-07-26 DIAGNOSIS — E78.5 HYPERLIPIDEMIA, UNSPECIFIED HYPERLIPIDEMIA TYPE: ICD-10-CM

## 2024-07-26 DIAGNOSIS — R41.3 MEMORY CHANGES: ICD-10-CM

## 2024-07-26 DIAGNOSIS — E11.29 TYPE 2 DIABETES MELLITUS WITH OTHER DIABETIC KIDNEY COMPLICATION (HCC): Primary | ICD-10-CM

## 2024-07-26 DIAGNOSIS — E11.29 TYPE 2 DIABETES MELLITUS WITH OTHER DIABETIC KIDNEY COMPLICATION (HCC): ICD-10-CM

## 2024-07-26 PROCEDURE — 3074F SYST BP LT 130 MM HG: CPT | Performed by: INTERNAL MEDICINE

## 2024-07-26 PROCEDURE — 3078F DIAST BP <80 MM HG: CPT | Performed by: INTERNAL MEDICINE

## 2024-07-26 PROCEDURE — 99214 OFFICE O/P EST MOD 30 MIN: CPT | Performed by: INTERNAL MEDICINE

## 2024-07-26 PROCEDURE — 3051F HG A1C>EQUAL 7.0%<8.0%: CPT | Performed by: INTERNAL MEDICINE

## 2024-07-26 RX ORDER — LISINOPRIL 20 MG/1
20 TABLET ORAL DAILY
Qty: 90 TABLET | Refills: 3 | Status: SHIPPED | OUTPATIENT
Start: 2024-07-26

## 2024-07-26 RX ORDER — INSULIN GLARGINE 100 [IU]/ML
20 INJECTION, SOLUTION SUBCUTANEOUS EVERY EVENING
Qty: 3 ADJUSTABLE DOSE PRE-FILLED PEN SYRINGE | Refills: 1 | Status: SHIPPED | OUTPATIENT
Start: 2024-07-26

## 2024-07-26 RX ORDER — DEXTROAMPHETAMINE SACCHARATE, AMPHETAMINE ASPARTATE, DEXTROAMPHETAMINE SULFATE AND AMPHETAMINE SULFATE 2.5; 2.5; 2.5; 2.5 MG/1; MG/1; MG/1; MG/1
20 TABLET ORAL AS NEEDED
COMMUNITY
Start: 2024-06-21

## 2024-07-26 RX ORDER — DULAGLUTIDE 3 MG/.5ML
INJECTION, SOLUTION SUBCUTANEOUS
Qty: 12 ADJUSTABLE DOSE PRE-FILLED PEN SYRINGE | Refills: 3 | Status: SHIPPED | OUTPATIENT
Start: 2024-07-26

## 2024-07-26 RX ORDER — GLIMEPIRIDE 2 MG/1
2 TABLET ORAL
Qty: 90 TABLET | Refills: 3 | Status: SHIPPED | OUTPATIENT
Start: 2024-07-26

## 2024-07-26 RX ORDER — ATORVASTATIN CALCIUM 20 MG/1
20 TABLET, FILM COATED ORAL DAILY
Qty: 90 TABLET | Refills: 3 | Status: SHIPPED | OUTPATIENT
Start: 2024-07-26

## 2024-07-26 ASSESSMENT — ENCOUNTER SYMPTOMS
RESPIRATORY NEGATIVE: 1
BACK PAIN: 1

## 2024-07-26 NOTE — PROGRESS NOTES
Chief Complaint   Patient presents with    Discuss Forms    Medication Refill    Disability           \"Have you been to the ER, urgent care clinic since your last visit?  Hospitalized since your last visit?\"    Sore throat/back pain  Last Monday  [facility]    “Have you seen or consulted any other health care providers outside of [facility] since your last visit?”    NO        “Have you had a colorectal cancer screening such as a colonoscopy/FIT/Cologuard?    Yes  I - [facility]    No colonoscopy on file  No cologuard on file  No FIT/FOBT on file   No flexible sigmoidoscopy on file         Click Here for Release of Records Request     No

## 2024-07-26 NOTE — PROGRESS NOTES
Subjective    Nathaniel Miller is a 59 y.o. male who presents today for the following:  Chief Complaint   Patient presents with    Discuss Forms    Medication Refill    Disability       History of Present Illness  The patient presents for follow-up.    The patient's FreeStyle Irwin 2 device was partially reordered due to insurance issues. He requires a 3-month supply instead of the 3-month supply, which he finds effective. He alternates the device every 14 days. His blood glucose levels have been fluctuating, with the highest recorded level in the 250s and the lowest in the 60s, a rare occurrence than the elevated level. He requires refills for Humalog, atorvastatin, glimepiride, lisinopril, Lantus, and Trulicity, the latter of which he reports to be significantly beneficial.    The patient has applied for disability, which initially declined. His mobility is limited due to diabetes, foot numbness, 6 back surgeries resulting in nerve damage, and 2 fusions. He is unable to stand or sit for extended periods, and his hand tremors have worsened. He is unable to write or type for extended periods. He has hearing issues and is unable to wear headsets or place a phone in his ear. He is unable to perform customer service jobs and can not lift over 25 pounds, as recommended by his back doctors. He denies any recent falls, but his equilibrium is deteriorating. He primarily uses assistive devices when he experiences dizziness. He has a follow-up appointment with his neurologist next week. He reports experiencing short-term memory loss, which he attributes to aging. This memory loss has been progressively worsening over the past 1.5 years. He had to cancel one of his appointments last week due to feeling overwhelmed.    The patient finds Xanax beneficial for his anxiety, which he takes before bedtime. He reports feeling overwhelmed due to financial issues.        PMH/PSH/Allergies/Social History/medication list and most recent

## 2024-07-27 LAB
ALBUMIN SERPL-MCNC: 4.5 G/DL (ref 3.8–4.9)
ALP SERPL-CCNC: 111 IU/L (ref 44–121)
ALT SERPL-CCNC: 19 IU/L (ref 0–44)
APPEARANCE UR: CLEAR
AST SERPL-CCNC: 24 IU/L (ref 0–40)
BASOPHILS # BLD AUTO: 0.1 X10E3/UL (ref 0–0.2)
BASOPHILS NFR BLD AUTO: 1 %
BILIRUB SERPL-MCNC: 0.5 MG/DL (ref 0–1.2)
BILIRUB UR QL STRIP: NEGATIVE
BUN SERPL-MCNC: 15 MG/DL (ref 6–24)
BUN/CREAT SERPL: 10 (ref 9–20)
CALCIUM SERPL-MCNC: 9.7 MG/DL (ref 8.7–10.2)
CHLORIDE SERPL-SCNC: 101 MMOL/L (ref 96–106)
CHOLEST SERPL-MCNC: 127 MG/DL (ref 100–199)
CO2 SERPL-SCNC: 24 MMOL/L (ref 20–29)
COLOR UR: YELLOW
CREAT SERPL-MCNC: 1.44 MG/DL (ref 0.76–1.27)
EGFRCR SERPLBLD CKD-EPI 2021: 56 ML/MIN/1.73
EOSINOPHIL # BLD AUTO: 0.2 X10E3/UL (ref 0–0.4)
EOSINOPHIL NFR BLD AUTO: 3 %
ERYTHROCYTE [DISTWIDTH] IN BLOOD BY AUTOMATED COUNT: 12.7 % (ref 11.6–15.4)
GLOBULIN SER CALC-MCNC: 3.2 G/DL (ref 1.5–4.5)
GLUCOSE SERPL-MCNC: 130 MG/DL (ref 70–99)
GLUCOSE UR QL STRIP: ABNORMAL
HBA1C MFR BLD: 7.2 % (ref 4.8–5.6)
HCT VFR BLD AUTO: 48.4 % (ref 37.5–51)
HDLC SERPL-MCNC: 24 MG/DL
HGB BLD-MCNC: 15.4 G/DL (ref 13–17.7)
HGB UR QL STRIP: NEGATIVE
IMM GRANULOCYTES # BLD AUTO: 0 X10E3/UL (ref 0–0.1)
IMM GRANULOCYTES NFR BLD AUTO: 0 %
IMP & REVIEW OF LAB RESULTS: NORMAL
KETONES UR QL STRIP: NEGATIVE
LDLC SERPL CALC-MCNC: 66 MG/DL (ref 0–99)
LEUKOCYTE ESTERASE UR QL STRIP: NEGATIVE
LYMPHOCYTES # BLD AUTO: 2 X10E3/UL (ref 0.7–3.1)
LYMPHOCYTES NFR BLD AUTO: 25 %
Lab: NORMAL
MCH RBC QN AUTO: 26.6 PG (ref 26.6–33)
MCHC RBC AUTO-ENTMCNC: 31.8 G/DL (ref 31.5–35.7)
MCV RBC AUTO: 84 FL (ref 79–97)
MONOCYTES # BLD AUTO: 1 X10E3/UL (ref 0.1–0.9)
MONOCYTES NFR BLD AUTO: 12 %
NEUTROPHILS # BLD AUTO: 4.7 X10E3/UL (ref 1.4–7)
NEUTROPHILS NFR BLD AUTO: 59 %
NITRITE UR QL STRIP: NEGATIVE
PH UR STRIP: 5.5 [PH] (ref 5–7.5)
PLATELET # BLD AUTO: 295 X10E3/UL (ref 150–450)
POTASSIUM SERPL-SCNC: 5.2 MMOL/L (ref 3.5–5.2)
PROT SERPL-MCNC: 7.7 G/DL (ref 6–8.5)
PROT UR QL STRIP: NEGATIVE
RBC # BLD AUTO: 5.78 X10E6/UL (ref 4.14–5.8)
REPORT: NORMAL
REPORT: NORMAL
SODIUM SERPL-SCNC: 139 MMOL/L (ref 134–144)
SP GR UR STRIP: >=1.03 (ref 1–1.03)
TRIGL SERPL-MCNC: 227 MG/DL (ref 0–149)
TSH SERPL DL<=0.005 MIU/L-ACNC: 1.01 UIU/ML (ref 0.45–4.5)
UROBILINOGEN UR STRIP-MCNC: 0.2 MG/DL (ref 0.2–1)
VIT B12 SERPL-MCNC: 331 PG/ML (ref 232–1245)
VLDLC SERPL CALC-MCNC: 37 MG/DL (ref 5–40)
WBC # BLD AUTO: 8 X10E3/UL (ref 3.4–10.8)

## 2024-07-29 ENCOUNTER — TELEPHONE (OUTPATIENT)
Age: 59
End: 2024-07-29

## 2024-07-29 NOTE — TELEPHONE ENCOUNTER
Called 12:55 PM  Spoke with patient after verifying name and . Notified patient of lab results and recommendation from provider. Patient verbalized understanding and given a chance to ask questions.      ----- Message from GAUTAM Feliciano NP sent at 2024  5:26 PM EDT -----  Has a lot of glucose in the urine. Reduce sugar in his diet and drink water.   His A1c remains around the same as he is a DM. Please work on a low carb, low sugar diet   Cr is increasing. Be sure he is pushing water as tolerated.

## 2024-07-30 ENCOUNTER — OFFICE VISIT (OUTPATIENT)
Age: 59
End: 2024-07-30
Payer: COMMERCIAL

## 2024-07-30 VITALS
BODY MASS INDEX: 24.38 KG/M2 | DIASTOLIC BLOOD PRESSURE: 74 MMHG | SYSTOLIC BLOOD PRESSURE: 122 MMHG | OXYGEN SATURATION: 97 % | HEART RATE: 68 BPM | RESPIRATION RATE: 16 BRPM | TEMPERATURE: 97.6 F | WEIGHT: 190 LBS | HEIGHT: 74 IN

## 2024-07-30 DIAGNOSIS — G25.0 BENIGN ESSENTIAL TREMOR: Primary | ICD-10-CM

## 2024-07-30 DIAGNOSIS — E11.42 DIABETIC POLYNEUROPATHY ASSOCIATED WITH TYPE 2 DIABETES MELLITUS (HCC): ICD-10-CM

## 2024-07-30 PROCEDURE — 3051F HG A1C>EQUAL 7.0%<8.0%: CPT | Performed by: PSYCHIATRY & NEUROLOGY

## 2024-07-30 PROCEDURE — 3074F SYST BP LT 130 MM HG: CPT | Performed by: PSYCHIATRY & NEUROLOGY

## 2024-07-30 PROCEDURE — 3078F DIAST BP <80 MM HG: CPT | Performed by: PSYCHIATRY & NEUROLOGY

## 2024-07-30 PROCEDURE — 99214 OFFICE O/P EST MOD 30 MIN: CPT | Performed by: PSYCHIATRY & NEUROLOGY

## 2024-07-30 RX ORDER — PROPRANOLOL HYDROCHLORIDE 160 MG/1
160 CAPSULE, EXTENDED RELEASE ORAL DAILY
Qty: 30 CAPSULE | Refills: 5 | Status: SHIPPED | OUTPATIENT
Start: 2024-07-30

## 2024-07-30 NOTE — PROGRESS NOTES
NEUROLOGY CLINIC NOTE    Patient ID:  Nathaniel Miller  [id number]  59 y.o.  1965    Date of Visit:  July 30, 2024    Reason for Visit:  tremor, neuropathy    Chief Complaint   Patient presents with    Peripheral Neuropathy     4 month follow up- patient reports neuropathy in both feet is worse and the numbness has moved up to ankle.    Tremors     4 month follow up -patient reports tremors in both hands is a little better and can write a little better.         History of Present Illness:     Patient Active Problem List    Diagnosis Date Noted    Dizziness 05/10/2023    Pain in both feet 11/22/2022    Abnormal liver CT 06/24/2022    Hyponatremia 01/13/2022    B12 deficiency 11/02/2021    Skin cyst 11/02/2021    Monoclonal gammopathy 11/02/2021    Memory loss 10/03/2021    Mild episode of recurrent major depressive disorder (HCC) 10/03/2021    Fatigue 10/03/2021    Neuropathy 10/03/2021    Essential hypertension 03/09/2021    Hyperlipidemia, unspecified hyperlipidemia type 03/09/2021    Essential tremor 03/09/2021    Attention deficit disorder, unspecified hyperactivity presence 03/09/2021    Anxiety 03/09/2021    Type 2 diabetes mellitus with microalbuminuria, with long-term current use of insulin (HCC) 03/09/2021    Gastroesophageal reflux disease without esophagitis 03/09/2021    Acute hyperkalemia 02/23/2021    Recurrent nephrolithiasis 02/23/2021    Sarcoidosis 02/23/2021    Cervical stenosis of spinal canal 05/13/2016     Past Medical History:   Diagnosis Date    Cervical stenosis of spinal canal 5/13/2016    Diabetes (HCC)     Hypertension       Past Surgical History:   Procedure Laterality Date    CERVICAL FUSION      ORTHOPEDIC SURGERY      multi lumbar lami/fusions    ME UNLISTED PROCEDURE ABDOMEN PERITONEUM & OMENTUM  2013    liver biopsy    TONSILLECTOMY      UROLOGICAL SURGERY      Kidney Stones \"vacuumed\"      Current Outpatient Medications on File Prior to Visit   Medication Sig Dispense Refill

## 2024-10-02 ENCOUNTER — PATIENT MESSAGE (OUTPATIENT)
Age: 59
End: 2024-10-02

## 2024-10-02 DIAGNOSIS — E11.29 TYPE 2 DIABETES MELLITUS WITH OTHER DIABETIC KIDNEY COMPLICATION (HCC): ICD-10-CM

## 2024-10-02 RX ORDER — INSULIN LISPRO 100 [IU]/ML
INJECTION, SOLUTION INTRAVENOUS; SUBCUTANEOUS
Qty: 15 ADJUSTABLE DOSE PRE-FILLED PEN SYRINGE | Refills: 2 | Status: SHIPPED | OUTPATIENT
Start: 2024-10-02

## 2024-10-02 RX ORDER — PEN NEEDLE, DIABETIC 32GX 5/32"
NEEDLE, DISPOSABLE MISCELLANEOUS
Qty: 100 EACH | Refills: 59 | Status: SHIPPED | OUTPATIENT
Start: 2024-10-02

## 2024-10-02 RX ORDER — ALPRAZOLAM 0.25 MG/1
TABLET ORAL
Qty: 60 TABLET | OUTPATIENT
Start: 2024-10-02

## 2024-10-02 RX ORDER — FEXOFENADINE HCL 180 MG
TABLET ORAL DAILY
Qty: 30 CAPSULE | Refills: 11 | Status: SHIPPED | OUTPATIENT
Start: 2024-10-02

## 2024-10-04 DIAGNOSIS — I10 ESSENTIAL (PRIMARY) HYPERTENSION: Primary | ICD-10-CM

## 2024-10-04 RX ORDER — POTASSIUM CITRATE 15 MEQ/1
15 TABLET, EXTENDED RELEASE ORAL 2 TIMES DAILY
Qty: 60 TABLET | Refills: 11 | OUTPATIENT
Start: 2024-10-04

## 2024-10-04 RX ORDER — POTASSIUM CITRATE 15 MEQ/1
15 TABLET, EXTENDED RELEASE ORAL DAILY
Qty: 180 TABLET | Refills: 0 | Status: SHIPPED | OUTPATIENT
Start: 2024-10-04

## 2024-10-07 DIAGNOSIS — F41.9 ANXIETY: Primary | ICD-10-CM

## 2024-10-07 RX ORDER — ALPRAZOLAM 0.25 MG
0.25 TABLET ORAL NIGHTLY PRN
Qty: 30 TABLET | Refills: 0 | Status: SHIPPED | OUTPATIENT
Start: 2024-10-07 | End: 2024-11-06

## 2024-10-25 ENCOUNTER — OFFICE VISIT (OUTPATIENT)
Age: 59
End: 2024-10-25
Payer: COMMERCIAL

## 2024-10-25 VITALS
BODY MASS INDEX: 23.77 KG/M2 | WEIGHT: 185.2 LBS | SYSTOLIC BLOOD PRESSURE: 110 MMHG | HEIGHT: 74 IN | OXYGEN SATURATION: 98 % | HEART RATE: 92 BPM | DIASTOLIC BLOOD PRESSURE: 76 MMHG | RESPIRATION RATE: 18 BRPM

## 2024-10-25 DIAGNOSIS — E11.29 TYPE 2 DIABETES MELLITUS WITH OTHER DIABETIC KIDNEY COMPLICATION (HCC): ICD-10-CM

## 2024-10-25 DIAGNOSIS — H04.123 DRY MOUTH AND EYES: ICD-10-CM

## 2024-10-25 DIAGNOSIS — I10 ESSENTIAL (PRIMARY) HYPERTENSION: ICD-10-CM

## 2024-10-25 DIAGNOSIS — E78.5 HYPERLIPIDEMIA, UNSPECIFIED HYPERLIPIDEMIA TYPE: ICD-10-CM

## 2024-10-25 DIAGNOSIS — G47.00 INSOMNIA, UNSPECIFIED TYPE: Primary | ICD-10-CM

## 2024-10-25 DIAGNOSIS — R68.2 DRY MOUTH AND EYES: ICD-10-CM

## 2024-10-25 DIAGNOSIS — Z82.49 FAMILY HISTORY OF CORONARY ARTERY DISEASE IN FATHER: ICD-10-CM

## 2024-10-25 PROCEDURE — 3078F DIAST BP <80 MM HG: CPT | Performed by: INTERNAL MEDICINE

## 2024-10-25 PROCEDURE — 3074F SYST BP LT 130 MM HG: CPT | Performed by: INTERNAL MEDICINE

## 2024-10-25 PROCEDURE — 3051F HG A1C>EQUAL 7.0%<8.0%: CPT | Performed by: INTERNAL MEDICINE

## 2024-10-25 PROCEDURE — 99214 OFFICE O/P EST MOD 30 MIN: CPT | Performed by: INTERNAL MEDICINE

## 2024-10-25 RX ORDER — SILDENAFIL 100 MG/1
100 TABLET, FILM COATED ORAL PRN
COMMUNITY

## 2024-10-25 SDOH — ECONOMIC STABILITY: FOOD INSECURITY: WITHIN THE PAST 12 MONTHS, YOU WORRIED THAT YOUR FOOD WOULD RUN OUT BEFORE YOU GOT MONEY TO BUY MORE.: NEVER TRUE

## 2024-10-25 SDOH — ECONOMIC STABILITY: INCOME INSECURITY: HOW HARD IS IT FOR YOU TO PAY FOR THE VERY BASICS LIKE FOOD, HOUSING, MEDICAL CARE, AND HEATING?: NOT HARD AT ALL

## 2024-10-25 SDOH — ECONOMIC STABILITY: FOOD INSECURITY: WITHIN THE PAST 12 MONTHS, THE FOOD YOU BOUGHT JUST DIDN'T LAST AND YOU DIDN'T HAVE MONEY TO GET MORE.: NEVER TRUE

## 2024-10-25 ASSESSMENT — ENCOUNTER SYMPTOMS
RESPIRATORY NEGATIVE: 1
NAUSEA: 1

## 2024-10-25 NOTE — PROGRESS NOTES
Chief Complaint   Patient presents with    Diabetes    Medication Check    Hypertension     \"Have you been to the ER, urgent care clinic since your last visit?  Hospitalized since your last visit?\"    NO    “Have you seen or consulted any other health care providers outside our system since your last visit?”    NO      “Have you had a colorectal cancer screening such as a colonoscopy/FIT/Cologuard?    [facility]     No colonoscopy on file  No cologuard on file  No FIT/FOBT on file   No flexible sigmoidoscopy on file     “Have you had a diabetic eye exam?”    yes    Date of last diabetic eye exam: 10/23/2023

## 2024-10-25 NOTE — PROGRESS NOTES
Subjective    Nathaniel Miller is a 59 y.o. male who presents today for the following:  Chief Complaint   Patient presents with    Diabetes    Medication Check    Hypertension    Gastroesophageal Reflux       History of Present Illness  The patient presents for a medication review.    He is currently on a regimen of Xanax, atorvastatin, Wellbutrin, Trulicity, glipizide, Lantus, Humalog, Jardiance, lisinopril, omeprazole, Zofran, potassium, primidone, propranolol, and sildenafil. He has tried various combinations and timings of his medications, but none have been effective.    He reports experiencing fatigue and nausea, which he attributes to his sinus allergies and nighttime drainage. His fatigue is so severe that he often falls asleep during the day.    He has been using Xanax to manage his insomnia, stress, and anxiety, which he finds helpful in reducing his panic attacks. He takes Xanax as needed, particularly during stressful periods such as when he is paying bills. He typically gets 5 to 6 hours of sleep without Xanax and 7 to 9 hours with it. He has not tried any other sleep aids.    He is not currently taking Adderall as he is not working.    He also reports experiencing dry mouth and dry eyes at night, which he believes may be due to skin dryness rather than an eye condition. He has increased his water intake and reduced his soda consumption to improve his hydration. He had an eye exam 3 months ago, but the dry eyes had not started at that time. He finds that eye drops help alleviate the dryness.    FAMILY HISTORY  His father had heart attacks and stroke and passed away from stroke.        PMH/PSH/Allergies/Social History/medication list and most recent studies reviewed with patient.     reports that he has been smoking cigars. He has never used smokeless tobacco.    reports current alcohol use.   Results  Laboratory Studies  A1c was at 7.     Vitals:    10/25/24 1017   BP: 110/76   Pulse: 92   Resp: 18

## 2024-10-29 LAB
HBA1C MFR BLD HPLC: 6.3 %
MICROALBUMIN/CREATININE RATIO, EXTERNAL: <30

## 2024-11-01 LAB
ALBUMIN SERPL-MCNC: 4.5 G/DL (ref 3.8–4.9)
ALP SERPL-CCNC: 108 IU/L (ref 44–121)
ALT SERPL-CCNC: 22 IU/L (ref 0–44)
AST SERPL-CCNC: 19 IU/L (ref 0–40)
BILIRUB SERPL-MCNC: 0.4 MG/DL (ref 0–1.2)
BUN SERPL-MCNC: 11 MG/DL (ref 6–24)
BUN/CREAT SERPL: 8 (ref 9–20)
CALCIUM SERPL-MCNC: 9.4 MG/DL (ref 8.7–10.2)
CHLORIDE SERPL-SCNC: 105 MMOL/L (ref 96–106)
CHOLEST SERPL-MCNC: 145 MG/DL (ref 100–199)
CO2 SERPL-SCNC: 23 MMOL/L (ref 20–29)
CREAT SERPL-MCNC: 1.4 MG/DL (ref 0.76–1.27)
EGFRCR SERPLBLD CKD-EPI 2021: 58 ML/MIN/1.73
GLOBULIN SER CALC-MCNC: 2.6 G/DL (ref 1.5–4.5)
GLUCOSE SERPL-MCNC: 197 MG/DL (ref 70–99)
HBA1C MFR BLD: 7.1 % (ref 4.8–5.6)
HDLC SERPL-MCNC: 30 MG/DL
IMP & REVIEW OF LAB RESULTS: NORMAL
LDLC SERPL CALC-MCNC: 80 MG/DL (ref 0–99)
Lab: NORMAL
POTASSIUM SERPL-SCNC: 5.3 MMOL/L (ref 3.5–5.2)
PROT SERPL-MCNC: 7.1 G/DL (ref 6–8.5)
REPORT: NORMAL
REPORT: NORMAL
SODIUM SERPL-SCNC: 143 MMOL/L (ref 134–144)
TRIGL SERPL-MCNC: 209 MG/DL (ref 0–149)
VLDLC SERPL CALC-MCNC: 35 MG/DL (ref 5–40)

## 2024-12-09 DIAGNOSIS — G25.0 BENIGN ESSENTIAL TREMOR: ICD-10-CM

## 2024-12-10 DIAGNOSIS — F41.9 ANXIETY: ICD-10-CM

## 2024-12-10 RX ORDER — PRIMIDONE 50 MG/1
100 TABLET ORAL 2 TIMES DAILY
Qty: 360 TABLET | Refills: 1 | Status: SHIPPED | OUTPATIENT
Start: 2024-12-10

## 2024-12-11 RX ORDER — ALPRAZOLAM 0.25 MG/1
0.25 TABLET ORAL NIGHTLY PRN
Qty: 30 TABLET | Refills: 0 | Status: SHIPPED | OUTPATIENT
Start: 2024-12-11 | End: 2025-01-10

## 2025-02-03 ENCOUNTER — OFFICE VISIT (OUTPATIENT)
Age: 60
End: 2025-02-03
Payer: COMMERCIAL

## 2025-02-03 VITALS
HEIGHT: 74 IN | DIASTOLIC BLOOD PRESSURE: 74 MMHG | WEIGHT: 187 LBS | OXYGEN SATURATION: 96 % | SYSTOLIC BLOOD PRESSURE: 118 MMHG | BODY MASS INDEX: 24 KG/M2 | TEMPERATURE: 97.1 F | HEART RATE: 81 BPM | RESPIRATION RATE: 18 BRPM

## 2025-02-03 DIAGNOSIS — E11.42 DIABETIC POLYNEUROPATHY ASSOCIATED WITH TYPE 2 DIABETES MELLITUS (HCC): ICD-10-CM

## 2025-02-03 DIAGNOSIS — G25.0 BENIGN ESSENTIAL TREMOR: Primary | ICD-10-CM

## 2025-02-03 PROCEDURE — 99214 OFFICE O/P EST MOD 30 MIN: CPT | Performed by: PSYCHIATRY & NEUROLOGY

## 2025-02-03 PROCEDURE — 3078F DIAST BP <80 MM HG: CPT | Performed by: PSYCHIATRY & NEUROLOGY

## 2025-02-03 PROCEDURE — 3074F SYST BP LT 130 MM HG: CPT | Performed by: PSYCHIATRY & NEUROLOGY

## 2025-02-03 RX ORDER — PROPRANOLOL HYDROCHLORIDE 160 MG/1
160 CAPSULE, EXTENDED RELEASE ORAL DAILY
Qty: 90 CAPSULE | Refills: 3 | Status: SHIPPED | OUTPATIENT
Start: 2025-02-03

## 2025-02-03 ASSESSMENT — PATIENT HEALTH QUESTIONNAIRE - PHQ9
SUM OF ALL RESPONSES TO PHQ QUESTIONS 1-9: 1
2. FEELING DOWN, DEPRESSED OR HOPELESS: SEVERAL DAYS
1. LITTLE INTEREST OR PLEASURE IN DOING THINGS: NOT AT ALL
SUM OF ALL RESPONSES TO PHQ9 QUESTIONS 1 & 2: 1

## 2025-02-03 NOTE — PROGRESS NOTES
NEUROLOGY CLINIC NOTE    Patient ID:  Nathaniel Miller  [id number]  59 y.o.  1965    Date of Visit:  February 3, 2025    Reason for Visit:  tremor, neuropathy    Chief Complaint   Patient presents with    Peripheral Neuropathy     6 month follow up-patient reports neuropathy in both feet has gotten worse.    Tremors     6 month follow up-patient reports tremors in both hands are about the same.         History of Present Illness:     Patient Active Problem List    Diagnosis Date Noted    Dizziness 05/10/2023    Pain in both feet 11/22/2022    Abnormal liver CT 06/24/2022    Hyponatremia 01/13/2022    B12 deficiency 11/02/2021    Skin cyst 11/02/2021    Monoclonal gammopathy 11/02/2021    Memory loss 10/03/2021    Mild episode of recurrent major depressive disorder (HCC) 10/03/2021    Fatigue 10/03/2021    Neuropathy 10/03/2021    Essential hypertension 03/09/2021    Hyperlipidemia, unspecified hyperlipidemia type 03/09/2021    Essential tremor 03/09/2021    Attention deficit disorder 03/09/2021    Anxiety 03/09/2021    Type 2 diabetes mellitus with microalbuminuria, with long-term current use of insulin (HCC) 03/09/2021    Gastroesophageal reflux disease without esophagitis 03/09/2021    Acute hyperkalemia 02/23/2021    Recurrent nephrolithiasis 02/23/2021    Sarcoidosis 02/23/2021    Cervical stenosis of spinal canal 05/13/2016     Past Medical History:   Diagnosis Date    Cervical stenosis of spinal canal 5/13/2016    Diabetes (HCC)     Hypertension       Past Surgical History:   Procedure Laterality Date    CERVICAL FUSION      ORTHOPEDIC SURGERY      multi lumbar lami/fusions    MI UNLISTED PROCEDURE ABDOMEN PERITONEUM & OMENTUM  2013    liver biopsy    TONSILLECTOMY      UROLOGICAL SURGERY      Kidney Stones \"vacuumed\"      Current Outpatient Medications on File Prior to Visit   Medication Sig Dispense Refill    primidone (MYSOLINE) 50 MG tablet TAKE 2 TABLETS BY MOUTH IN THE MORNING AND AT BEDTIME 360

## 2025-02-23 DIAGNOSIS — E11.29 TYPE 2 DIABETES MELLITUS WITH OTHER DIABETIC KIDNEY COMPLICATION (HCC): ICD-10-CM

## 2025-02-24 RX ORDER — EMPAGLIFLOZIN 10 MG/1
TABLET, FILM COATED ORAL
Qty: 30 TABLET | Refills: 5 | Status: SHIPPED | OUTPATIENT
Start: 2025-02-24

## 2025-02-24 NOTE — TELEPHONE ENCOUNTER
Last appt 10/25/2024      Next Apt:     Future Appointments   Date Time Provider Department Center   8/4/2025  2:20 PM Conner Guevara Jr., MD [id number] BS AMB         [facility][id number] - [place], [place] - [address] [address] -  [phone] - F [phone] [address] [address] [address]  Phone: [phone] Fax: [phone]

## 2025-04-03 DIAGNOSIS — K21.9 GASTROESOPHAGEAL REFLUX DISEASE WITHOUT ESOPHAGITIS: ICD-10-CM

## 2025-04-03 RX ORDER — OMEPRAZOLE 20 MG/1
CAPSULE, DELAYED RELEASE ORAL DAILY
Qty: 30 CAPSULE | Refills: 1 | Status: SHIPPED | OUTPATIENT
Start: 2025-04-03

## 2025-05-23 ENCOUNTER — TELEPHONE (OUTPATIENT)
Age: 60
End: 2025-05-23

## 2025-05-23 DIAGNOSIS — E11.29 TYPE 2 DIABETES MELLITUS WITH OTHER DIABETIC KIDNEY COMPLICATION (HCC): ICD-10-CM

## 2025-05-23 DIAGNOSIS — K21.9 GASTROESOPHAGEAL REFLUX DISEASE WITHOUT ESOPHAGITIS: ICD-10-CM

## 2025-05-23 RX ORDER — GLIMEPIRIDE 2 MG/1
2 TABLET ORAL
Qty: 30 TABLET | Refills: 0 | Status: SHIPPED | OUTPATIENT
Start: 2025-05-23

## 2025-05-23 RX ORDER — OMEPRAZOLE 20 MG/1
20 CAPSULE, DELAYED RELEASE ORAL DAILY
Qty: 30 CAPSULE | Refills: 0 | Status: SHIPPED | OUTPATIENT
Start: 2025-05-23

## 2025-05-23 NOTE — TELEPHONE ENCOUNTER
Lov:10/25/2025  Nov:06/16/2025     Needs to be sent to [facility]    Continuous Glucose Sensor (FREESTYLE АЛЕКСАНДР 2 SENSOR) [facility]       [facility]  [address] [address] [address] [phone]

## 2025-05-23 NOTE — TELEPHONE ENCOUNTER
Lov:10/25/2025  Nov:06/16/2025    [id number] - [place], [place] - [address] [address]     Continuous Glucose Sensor (FREESTYLE АЛЕКСАНДР 2 SENSOR) MISC     glimepiride (AMARYL) 2 MG tablet     omeprazole (PRILOSEC) 20 MG delayed release capsule

## 2025-06-14 SDOH — ECONOMIC STABILITY: FOOD INSECURITY: WITHIN THE PAST 12 MONTHS, THE FOOD YOU BOUGHT JUST DIDN'T LAST AND YOU DIDN'T HAVE MONEY TO GET MORE.: SOMETIMES TRUE

## 2025-06-14 SDOH — ECONOMIC STABILITY: FOOD INSECURITY: WITHIN THE PAST 12 MONTHS, YOU WORRIED THAT YOUR FOOD WOULD RUN OUT BEFORE YOU GOT MONEY TO BUY MORE.: SOMETIMES TRUE

## 2025-06-14 SDOH — ECONOMIC STABILITY: TRANSPORTATION INSECURITY
IN THE PAST 12 MONTHS, HAS THE LACK OF TRANSPORTATION KEPT YOU FROM MEDICAL APPOINTMENTS OR FROM GETTING MEDICATIONS?: NO

## 2025-06-14 SDOH — ECONOMIC STABILITY: INCOME INSECURITY: IN THE LAST 12 MONTHS, WAS THERE A TIME WHEN YOU WERE NOT ABLE TO PAY THE MORTGAGE OR RENT ON TIME?: NO

## 2025-06-14 SDOH — ECONOMIC STABILITY: TRANSPORTATION INSECURITY
IN THE PAST 12 MONTHS, HAS LACK OF TRANSPORTATION KEPT YOU FROM MEETINGS, WORK, OR FROM GETTING THINGS NEEDED FOR DAILY LIVING?: NO

## 2025-06-16 ENCOUNTER — OFFICE VISIT (OUTPATIENT)
Age: 60
End: 2025-06-16
Payer: COMMERCIAL

## 2025-06-16 VITALS
OXYGEN SATURATION: 98 % | RESPIRATION RATE: 16 BRPM | HEART RATE: 74 BPM | HEIGHT: 74 IN | WEIGHT: 192.4 LBS | SYSTOLIC BLOOD PRESSURE: 130 MMHG | BODY MASS INDEX: 24.69 KG/M2 | DIASTOLIC BLOOD PRESSURE: 74 MMHG

## 2025-06-16 DIAGNOSIS — R68.2 DRY MOUTH AND EYES: ICD-10-CM

## 2025-06-16 DIAGNOSIS — I10 ESSENTIAL (PRIMARY) HYPERTENSION: ICD-10-CM

## 2025-06-16 DIAGNOSIS — F41.9 ANXIETY: Primary | ICD-10-CM

## 2025-06-16 DIAGNOSIS — E78.5 HYPERLIPIDEMIA, UNSPECIFIED HYPERLIPIDEMIA TYPE: ICD-10-CM

## 2025-06-16 DIAGNOSIS — R09.82 POST-NASAL DRIP: ICD-10-CM

## 2025-06-16 DIAGNOSIS — E11.29 TYPE 2 DIABETES MELLITUS WITH OTHER DIABETIC KIDNEY COMPLICATION (HCC): ICD-10-CM

## 2025-06-16 DIAGNOSIS — H04.123 DRY MOUTH AND EYES: ICD-10-CM

## 2025-06-16 PROCEDURE — 99214 OFFICE O/P EST MOD 30 MIN: CPT | Performed by: INTERNAL MEDICINE

## 2025-06-16 PROCEDURE — 3075F SYST BP GE 130 - 139MM HG: CPT | Performed by: INTERNAL MEDICINE

## 2025-06-16 PROCEDURE — 3078F DIAST BP <80 MM HG: CPT | Performed by: INTERNAL MEDICINE

## 2025-06-16 RX ORDER — CLOBETASOL PROPIONATE 0.5 MG/G
OINTMENT TOPICAL
COMMUNITY
Start: 2024-11-11

## 2025-06-16 RX ORDER — TRAMADOL HYDROCHLORIDE 50 MG/1
TABLET ORAL
COMMUNITY
Start: 2025-05-27 | End: 2025-06-16

## 2025-06-16 RX ORDER — CLINDAMYCIN HYDROCHLORIDE 300 MG/1
CAPSULE ORAL
COMMUNITY
Start: 2025-05-27

## 2025-06-16 RX ORDER — ALPRAZOLAM 0.25 MG
TABLET ORAL
COMMUNITY
Start: 2025-04-28

## 2025-06-16 RX ORDER — TAMSULOSIN HYDROCHLORIDE 0.4 MG/1
CAPSULE ORAL DAILY
COMMUNITY
Start: 2025-03-13 | End: 2025-06-16

## 2025-06-16 RX ORDER — IBUPROFEN 800 MG/1
TABLET, FILM COATED ORAL
COMMUNITY
Start: 2025-05-25

## 2025-06-16 RX ORDER — PENICILLIN V POTASSIUM 500 MG/1
TABLET, FILM COATED ORAL
COMMUNITY
Start: 2025-05-25

## 2025-06-16 ASSESSMENT — ENCOUNTER SYMPTOMS
RESPIRATORY NEGATIVE: 1
GASTROINTESTINAL NEGATIVE: 1

## 2025-06-16 NOTE — PROGRESS NOTES
Subjective    Nathaniel Miller is a 60 y.o. male who presents today for the following:  Chief Complaint   Patient presents with    Medication Refill     Irwin, zofran, and trulicity        History of Present Illness  The patient presents for a follow-up visit.    He reports an improvement in his overall well-being, attributing this to a reduction in stress levels following the approval of his disability claim. He is currently in the process of resolving his Medicare and Medicaid insurance issues. He has been receiving disability benefits for approximately 2.5 years. He recently underwent a tooth extraction procedure, which was covered by Medicaid. However, he was informed today that his insurance did not cover the procedure, prompting him to investigate further. His mother, who had dementia, passed away in 04/2025, which he believes has also contributed to his decreased stress levels. 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    He has not been monitoring his blood glucose levels for the past month due to a lack of sensors. He believes his A1c level remains around 7. He requires refills for Trulicity and Zofran.    He experiences significant nasal drainage at night, leading to nausea and dry heaves upon waking. These symptoms typically resolve by midday. The frequency of these episodes has increased, particularly during pollen season, although they have since decreased but remain regular. He occasionally takes Allegra, which provides relief.    He has been experiencing dry eyes for less than a year and dry mouth for several years, which he attributes to his medication regimen.  On ophthalmologists and he uses Act Moisture Drops at night, which he finds beneficial. He has not been screened for Sjogren's syndrome.    PAST SURGICAL HISTORY:  Tooth extraction: 06/2025    FAMILY HISTORY  His mother had dementia and passed away in April.        PMH/PSH/Allergies/Social History/medication list and most recent studies

## 2025-06-16 NOTE — PROGRESS NOTES
Have you been to the ER, urgent care clinic since your last visit?  Hospitalized since your last visit?   NO    Have you seen or consulted any other health care providers outside our system since your last visit?   NO    “Have you had a colorectal cancer screening such as a colonoscopy/FIT/Cologuard?    YES - Type: Colonoscopy at [facility]     No colonoscopy on file  No cologuard on file  No FIT/FOBT on file   No flexible sigmoidoscopy on file

## 2025-06-17 DIAGNOSIS — K21.9 GASTROESOPHAGEAL REFLUX DISEASE WITHOUT ESOPHAGITIS: ICD-10-CM

## 2025-06-17 RX ORDER — HYDROCHLOROTHIAZIDE 12.5 MG/1
CAPSULE ORAL
Qty: 3 EACH | Refills: 1 | Status: SHIPPED | OUTPATIENT
Start: 2025-06-17

## 2025-06-17 RX ORDER — ONDANSETRON 4 MG/1
4 TABLET, ORALLY DISINTEGRATING ORAL EVERY 8 HOURS PRN
Qty: 90 TABLET | Refills: 3 | Status: SHIPPED | OUTPATIENT
Start: 2025-06-17

## 2025-06-17 RX ORDER — OMEPRAZOLE 20 MG/1
CAPSULE, DELAYED RELEASE ORAL DAILY
Qty: 30 CAPSULE | Refills: 4 | Status: SHIPPED | OUTPATIENT
Start: 2025-06-17

## 2025-06-18 DIAGNOSIS — E11.29 TYPE 2 DIABETES MELLITUS WITH OTHER DIABETIC KIDNEY COMPLICATION (HCC): ICD-10-CM

## 2025-06-19 RX ORDER — SEMAGLUTIDE 1.34 MG/ML
INJECTION, SOLUTION SUBCUTANEOUS
Qty: 4 ADJUSTABLE DOSE PRE-FILLED PEN SYRINGE | Refills: 0 | Status: SHIPPED | OUTPATIENT
Start: 2025-06-19

## 2025-06-20 DIAGNOSIS — G25.0 BENIGN ESSENTIAL TREMOR: ICD-10-CM

## 2025-06-20 RX ORDER — PRIMIDONE 50 MG/1
100 TABLET ORAL 2 TIMES DAILY
Qty: 360 TABLET | Refills: 1 | Status: SHIPPED | OUTPATIENT
Start: 2025-06-20

## 2025-07-11 ENCOUNTER — TELEPHONE (OUTPATIENT)
Age: 60
End: 2025-07-11

## 2025-07-14 DIAGNOSIS — I10 ESSENTIAL (PRIMARY) HYPERTENSION: ICD-10-CM

## 2025-07-15 RX ORDER — ATORVASTATIN CALCIUM 20 MG/1
20 TABLET, FILM COATED ORAL DAILY
Qty: 90 TABLET | Refills: 1 | Status: SHIPPED | OUTPATIENT
Start: 2025-07-15

## 2025-07-30 DIAGNOSIS — E11.29 TYPE 2 DIABETES MELLITUS WITH OTHER DIABETIC KIDNEY COMPLICATION (HCC): ICD-10-CM

## 2025-07-30 RX ORDER — INSULIN GLARGINE 100 [IU]/ML
20 INJECTION, SOLUTION SUBCUTANEOUS EVERY EVENING
Qty: 45 ADJUSTABLE DOSE PRE-FILLED PEN SYRINGE | Refills: 2 | Status: SHIPPED | OUTPATIENT
Start: 2025-07-30

## 2025-08-25 DIAGNOSIS — I10 ESSENTIAL (PRIMARY) HYPERTENSION: ICD-10-CM

## 2025-08-25 RX ORDER — POTASSIUM CITRATE 15 MEQ/1
15 TABLET, EXTENDED RELEASE ORAL DAILY
Qty: 90 TABLET | Refills: 2 | Status: SHIPPED | OUTPATIENT
Start: 2025-08-25

## (undated) DEVICE — MARKER,SKIN,WI/RULER AND LABELS: Brand: MEDLINE

## (undated) DEVICE — PACK,CYSTOSCOPY,PK III,SIRUS: Brand: MEDLINE

## (undated) DEVICE — OPEN-END URETERAL CATHETER: Brand: COOK

## (undated) DEVICE — JELLY,LUBE,STERILE,FLIP TOP,TUBE,4-OZ: Brand: MEDLINE

## (undated) DEVICE — CONTAINER,SPECIMEN,4OZ,OR STRL: Brand: MEDLINE

## (undated) DEVICE — COVER LT HNDL PLAS RIG 1 PER PK

## (undated) DEVICE — STERILE POLYISOPRENE POWDER-FREE SURGICAL GLOVES: Brand: PROTEXIS

## (undated) DEVICE — BAG COLLECTION FLD OR-TBL NS --

## (undated) DEVICE — SYR 10ML LUER LOK 1/5ML GRAD --

## (undated) DEVICE — KIT SURG PREP POVIDONE IOD PRESATURATED PAINT WET FOR UNIV

## (undated) DEVICE — INFECTION CONTROL KIT SYS

## (undated) DEVICE — GOWN,SIRUS,POLYRNF,BRTHSLV,XL,30/CS: Brand: MEDLINE

## (undated) DEVICE — SPONGE GZ W4XL4IN COT 12 PLY TYP VII WVN C FLD DSGN

## (undated) DEVICE — GDWIRE UROL STR 150CM FLX TP -- BX/5 SENSOR

## (undated) DEVICE — BAG PRESSURE INFUSION 3 W STOPCOCK 3000 CC PREMIERPRO DISP

## (undated) DEVICE — TUBING, SUCTION, 1/4" X 12', STRAIGHT: Brand: MEDLINE

## (undated) DEVICE — TOWEL SURG W17XL27IN STD BLU COT NONFENESTRATED PREWASHED

## (undated) DEVICE — SOL IRR STRL H2O 1000ML BTL --

## (undated) DEVICE — URETERAL ACCESS SHEATH SET: Brand: NAVIGATOR

## (undated) DEVICE — STRAP,POSITIONING,KNEE/BODY,FOAM,4X60": Brand: MEDLINE

## (undated) DEVICE — NITINOL STONE RETRIEVAL BASKET: Brand: ZERO TIP

## (undated) DEVICE — Y-TYPE TUR IRRIGATION SET

## (undated) DEVICE — SOL IRR SOD CL 0.9% 3000ML --